# Patient Record
Sex: FEMALE | Race: WHITE | Employment: FULL TIME | URBAN - METROPOLITAN AREA
[De-identification: names, ages, dates, MRNs, and addresses within clinical notes are randomized per-mention and may not be internally consistent; named-entity substitution may affect disease eponyms.]

---

## 2018-10-04 ENCOUNTER — APPOINTMENT (OUTPATIENT)
Dept: GENERAL RADIOLOGY | Age: 63
End: 2018-10-04
Payer: COMMERCIAL

## 2018-10-04 ENCOUNTER — HOSPITAL ENCOUNTER (EMERGENCY)
Age: 63
Discharge: HOME | End: 2018-10-04
Payer: COMMERCIAL

## 2018-10-04 VITALS
TEMPERATURE: 98.3 F | RESPIRATION RATE: 16 BRPM | SYSTOLIC BLOOD PRESSURE: 135 MMHG | OXYGEN SATURATION: 97 % | WEIGHT: 195 LBS | DIASTOLIC BLOOD PRESSURE: 75 MMHG | HEART RATE: 97 BPM | BODY MASS INDEX: 31.34 KG/M2 | HEIGHT: 66 IN

## 2018-10-04 DIAGNOSIS — R09.81 NASAL CONGESTION: ICD-10-CM

## 2018-10-04 DIAGNOSIS — H93.8X3 EAR FULLNESS, BILATERAL: ICD-10-CM

## 2018-10-04 DIAGNOSIS — J40 BRONCHITIS: Primary | ICD-10-CM

## 2018-10-04 PROCEDURE — 6370000000 HC RX 637 (ALT 250 FOR IP): Performed by: NURSE PRACTITIONER

## 2018-10-04 PROCEDURE — 6360000002 HC RX W HCPCS: Performed by: NURSE PRACTITIONER

## 2018-10-04 PROCEDURE — 71046 X-RAY EXAM CHEST 2 VIEWS: CPT

## 2018-10-04 PROCEDURE — 99283 EMERGENCY DEPT VISIT LOW MDM: CPT

## 2018-10-04 PROCEDURE — 94664 DEMO&/EVAL PT USE INHALER: CPT

## 2018-10-04 PROCEDURE — 94644 CONT INHLJ TX 1ST HOUR: CPT

## 2018-10-04 RX ORDER — IPRATROPIUM BROMIDE AND ALBUTEROL SULFATE 2.5; .5 MG/3ML; MG/3ML
1 SOLUTION RESPIRATORY (INHALATION) ONCE
Status: COMPLETED | OUTPATIENT
Start: 2018-10-04 | End: 2018-10-04

## 2018-10-04 RX ORDER — ALBUTEROL SULFATE 90 UG/1
AEROSOL, METERED RESPIRATORY (INHALATION)
Qty: 1 INHALER | Refills: 1 | Status: SHIPPED | OUTPATIENT
Start: 2018-10-04

## 2018-10-04 RX ORDER — PREDNISONE 10 MG/1
40 TABLET ORAL DAILY
Qty: 20 TABLET | Refills: 0 | Status: SHIPPED | OUTPATIENT
Start: 2018-10-04 | End: 2018-10-09

## 2018-10-04 RX ORDER — BENZONATATE 100 MG/1
100 CAPSULE ORAL 3 TIMES DAILY PRN
Status: DISCONTINUED | OUTPATIENT
Start: 2018-10-04 | End: 2018-10-05 | Stop reason: HOSPADM

## 2018-10-04 RX ORDER — ALBUTEROL SULFATE 2.5 MG/3ML
5 SOLUTION RESPIRATORY (INHALATION) ONCE
Status: COMPLETED | OUTPATIENT
Start: 2018-10-04 | End: 2018-10-04

## 2018-10-04 RX ORDER — AZITHROMYCIN 250 MG/1
TABLET, FILM COATED ORAL
Qty: 1 PACKET | Refills: 0 | Status: SHIPPED | OUTPATIENT
Start: 2018-10-04 | End: 2018-10-14

## 2018-10-04 RX ORDER — PREDNISONE 20 MG/1
40 TABLET ORAL ONCE
Status: COMPLETED | OUTPATIENT
Start: 2018-10-04 | End: 2018-10-04

## 2018-10-04 RX ORDER — LORATADINE AND PSEUDOEPHEDRINE 10; 240 MG/1; MG/1
1 TABLET, EXTENDED RELEASE ORAL DAILY
COMMUNITY

## 2018-10-04 RX ORDER — BENZONATATE 100 MG/1
100 CAPSULE ORAL 3 TIMES DAILY PRN
Qty: 20 CAPSULE | Refills: 0 | Status: SHIPPED | OUTPATIENT
Start: 2018-10-04

## 2018-10-04 RX ADMIN — ALBUTEROL SULFATE 5 MG: 2.5 SOLUTION RESPIRATORY (INHALATION) at 21:43

## 2018-10-04 RX ADMIN — BENZONATATE 100 MG: 100 CAPSULE ORAL at 22:23

## 2018-10-04 RX ADMIN — PREDNISONE 40 MG: 20 TABLET ORAL at 21:33

## 2018-10-04 RX ADMIN — IPRATROPIUM BROMIDE AND ALBUTEROL SULFATE 1 AMPULE: .5; 3 SOLUTION RESPIRATORY (INHALATION) at 21:42

## 2018-10-04 ASSESSMENT — ENCOUNTER SYMPTOMS
CONSTIPATION: 0
EYES NEGATIVE: 1
SHORTNESS OF BREATH: 0
VOMITING: 0
WHEEZING: 1
ABDOMINAL PAIN: 0
COUGH: 1
DIARRHEA: 0
BACK PAIN: 0
NAUSEA: 0
ALLERGIC/IMMUNOLOGIC NEGATIVE: 1
ABDOMINAL DISTENTION: 0

## 2018-10-05 NOTE — ED PROVIDER NOTES
daily for 5 doses       DISCONTINUED MEDICATIONS:  Discontinued Medications    No medications on file              (Please note the MDM and HPI sections of this note were completed with a voice recognition program.  Efforts were made to edit the dictations but occasionally words are mis-transcribed.)    Electronically signed, CARLIE Vásquez CNP,        CARLIE Vásquez CNP  10/04/18 3326       CARLIE Vásquez CNP  10/04/18 2101

## 2024-08-12 ENCOUNTER — HOSPITAL ENCOUNTER (OUTPATIENT)
Age: 69
Setting detail: SPECIMEN
Discharge: HOME | End: 2024-08-12
Payer: MEDICARE

## 2024-08-12 LAB
ALBUMIN SERPL-MCNC: 3.6 G/DL (ref 3.4–5)
ALBUMIN/GLOB SERPL: 1.2 {RATIO} (ref 1.1–2.2)
ALP SERPL-CCNC: 73 U/L (ref 40–129)
ALT SERPL-CCNC: 8 U/L (ref 10–40)
ANION GAP SERPL CALCULATED.3IONS-SCNC: 9 MMOL/L (ref 3–16)
AST SERPL-CCNC: 11 U/L (ref 15–37)
BASOPHILS # BLD: 0 K/UL (ref 0–0.2)
BASOPHILS NFR BLD: 0.8 %
BILIRUB SERPL-MCNC: 0.3 MG/DL (ref 0–1)
BUN SERPL-MCNC: 28 MG/DL (ref 7–20)
CALCIUM SERPL-MCNC: 9.4 MG/DL (ref 8.3–10.6)
CHLORIDE SERPL-SCNC: 95 MMOL/L (ref 99–110)
CO2 SERPL-SCNC: 31 MMOL/L (ref 21–32)
CREAT SERPL-MCNC: 1.3 MG/DL (ref 0.6–1.2)
DEPRECATED RDW RBC AUTO: 15.1 % (ref 12.4–15.4)
EOSINOPHIL # BLD: 0.1 K/UL (ref 0–0.6)
EOSINOPHIL NFR BLD: 2.1 %
GFR SERPLBLD CREATININE-BSD FMLA CKD-EPI: 45 ML/MIN/{1.73_M2}
GLUCOSE SERPL-MCNC: 97 MG/DL (ref 70–99)
HCT VFR BLD AUTO: 24.9 % (ref 36–48)
HGB BLD-MCNC: 8.3 G/DL (ref 12–16)
LYMPHOCYTES # BLD: 0.6 K/UL (ref 1–5.1)
LYMPHOCYTES NFR BLD: 15.4 %
MAGNESIUM SERPL-MCNC: 1.6 MG/DL (ref 1.8–2.4)
MCH RBC QN AUTO: 29.4 PG (ref 26–34)
MCHC RBC AUTO-ENTMCNC: 33.5 G/DL (ref 31–36)
MCV RBC AUTO: 88 FL (ref 80–100)
MONOCYTES # BLD: 0.5 K/UL (ref 0–1.3)
MONOCYTES NFR BLD: 13 %
NEUTROPHILS # BLD: 2.7 K/UL (ref 1.7–7.7)
NEUTROPHILS NFR BLD: 68.7 %
PLATELET # BLD AUTO: 287 K/UL (ref 135–450)
PMV BLD AUTO: 9.8 FL (ref 5–10.5)
POTASSIUM SERPL-SCNC: 4.3 MMOL/L (ref 3.5–5.1)
PROT SERPL-MCNC: 6.6 G/DL (ref 6.4–8.2)
RBC # BLD AUTO: 2.84 M/UL (ref 4–5.2)
SODIUM SERPL-SCNC: 135 MMOL/L (ref 136–145)
WBC # BLD AUTO: 4 K/UL (ref 4–11)

## 2024-08-12 PROCEDURE — 85025 COMPLETE CBC W/AUTO DIFF WBC: CPT

## 2024-08-12 PROCEDURE — 80053 COMPREHEN METABOLIC PANEL: CPT

## 2024-08-12 PROCEDURE — 83735 ASSAY OF MAGNESIUM: CPT

## 2024-08-15 ENCOUNTER — HOSPITAL ENCOUNTER (OUTPATIENT)
Age: 69
Setting detail: SPECIMEN
Discharge: HOME | End: 2024-08-15
Payer: MEDICARE

## 2024-08-15 LAB
ALBUMIN SERPL-MCNC: 3.7 G/DL (ref 3.4–5)
ALBUMIN/GLOB SERPL: 1.1 {RATIO} (ref 1.1–2.2)
ALP SERPL-CCNC: 79 U/L (ref 40–129)
ALT SERPL-CCNC: 7 U/L (ref 10–40)
ANION GAP SERPL CALCULATED.3IONS-SCNC: 11 MMOL/L (ref 3–16)
AST SERPL-CCNC: 12 U/L (ref 15–37)
BASOPHILS # BLD: 0 K/UL (ref 0–0.2)
BASOPHILS NFR BLD: 1.1 %
BILIRUB SERPL-MCNC: 0.3 MG/DL (ref 0–1)
BUN SERPL-MCNC: 23 MG/DL (ref 7–20)
CALCIUM SERPL-MCNC: 9.6 MG/DL (ref 8.3–10.6)
CHLORIDE SERPL-SCNC: 94 MMOL/L (ref 99–110)
CO2 SERPL-SCNC: 29 MMOL/L (ref 21–32)
CREAT SERPL-MCNC: 1.4 MG/DL (ref 0.6–1.2)
DEPRECATED RDW RBC AUTO: 17 % (ref 12.4–15.4)
EOSINOPHIL # BLD: 0.1 K/UL (ref 0–0.6)
EOSINOPHIL NFR BLD: 1.8 %
GFR SERPLBLD CREATININE-BSD FMLA CKD-EPI: 41 ML/MIN/{1.73_M2}
GLUCOSE SERPL-MCNC: 188 MG/DL (ref 70–99)
HCT VFR BLD AUTO: 27 % (ref 36–48)
HGB BLD-MCNC: 9 G/DL (ref 12–16)
LYMPHOCYTES # BLD: 0.6 K/UL (ref 1–5.1)
LYMPHOCYTES NFR BLD: 13.6 %
MAGNESIUM SERPL-MCNC: 1.5 MG/DL (ref 1.8–2.4)
MCH RBC QN AUTO: 29.7 PG (ref 26–34)
MCHC RBC AUTO-ENTMCNC: 33.5 G/DL (ref 31–36)
MCV RBC AUTO: 88.6 FL (ref 80–100)
MONOCYTES # BLD: 0.5 K/UL (ref 0–1.3)
MONOCYTES NFR BLD: 12.3 %
NEUTROPHILS # BLD: 3 K/UL (ref 1.7–7.7)
NEUTROPHILS NFR BLD: 71.2 %
PLATELET # BLD AUTO: 332 K/UL (ref 135–450)
PMV BLD AUTO: 9.2 FL (ref 5–10.5)
POTASSIUM SERPL-SCNC: 4.1 MMOL/L (ref 3.5–5.1)
PROT SERPL-MCNC: 7.2 G/DL (ref 6.4–8.2)
RBC # BLD AUTO: 3.05 M/UL (ref 4–5.2)
SODIUM SERPL-SCNC: 134 MMOL/L (ref 136–145)
WBC # BLD AUTO: 4.2 K/UL (ref 4–11)

## 2024-08-15 PROCEDURE — 83735 ASSAY OF MAGNESIUM: CPT

## 2024-08-15 PROCEDURE — 80053 COMPREHEN METABOLIC PANEL: CPT

## 2024-08-15 PROCEDURE — 85025 COMPLETE CBC W/AUTO DIFF WBC: CPT

## 2024-08-15 PROCEDURE — 36415 COLL VENOUS BLD VENIPUNCTURE: CPT

## 2024-08-19 ENCOUNTER — HOSPITAL ENCOUNTER (OUTPATIENT)
Age: 69
Setting detail: SPECIMEN
Discharge: HOME | End: 2024-08-19
Payer: MEDICARE

## 2024-08-19 LAB
ALBUMIN SERPL-MCNC: 3.7 G/DL (ref 3.4–5)
ALBUMIN/GLOB SERPL: 1.1 {RATIO} (ref 1.1–2.2)
ALP SERPL-CCNC: 84 U/L (ref 40–129)
ALT SERPL-CCNC: 9 U/L (ref 10–40)
ANION GAP SERPL CALCULATED.3IONS-SCNC: 15 MMOL/L (ref 3–16)
AST SERPL-CCNC: 13 U/L (ref 15–37)
BASOPHILS # BLD: 0 K/UL (ref 0–0.2)
BASOPHILS NFR BLD: 1 %
BILIRUB SERPL-MCNC: <0.2 MG/DL (ref 0–1)
BUN SERPL-MCNC: 21 MG/DL (ref 7–20)
CALCIUM SERPL-MCNC: 9.7 MG/DL (ref 8.3–10.6)
CHLORIDE SERPL-SCNC: 92 MMOL/L (ref 99–110)
CO2 SERPL-SCNC: 30 MMOL/L (ref 21–32)
CREAT SERPL-MCNC: 1.4 MG/DL (ref 0.6–1.2)
DEPRECATED RDW RBC AUTO: 17.9 % (ref 12.4–15.4)
EOSINOPHIL # BLD: 0.1 K/UL (ref 0–0.6)
EOSINOPHIL NFR BLD: 2.1 %
GFR SERPLBLD CREATININE-BSD FMLA CKD-EPI: 41 ML/MIN/{1.73_M2}
GLUCOSE SERPL-MCNC: 108 MG/DL (ref 70–99)
HCT VFR BLD AUTO: 25.2 % (ref 36–48)
HGB BLD-MCNC: 8.5 G/DL (ref 12–16)
LYMPHOCYTES # BLD: 0.5 K/UL (ref 1–5.1)
LYMPHOCYTES NFR BLD: 12 %
MAGNESIUM SERPL-MCNC: 1.7 MG/DL (ref 1.8–2.4)
MCH RBC QN AUTO: 29.9 PG (ref 26–34)
MCHC RBC AUTO-ENTMCNC: 33.8 G/DL (ref 31–36)
MCV RBC AUTO: 88.4 FL (ref 80–100)
MONOCYTES # BLD: 0.6 K/UL (ref 0–1.3)
MONOCYTES NFR BLD: 12.9 %
NEUTROPHILS # BLD: 3.1 K/UL (ref 1.7–7.7)
NEUTROPHILS NFR BLD: 72 %
PLATELET # BLD AUTO: 285 K/UL (ref 135–450)
PMV BLD AUTO: 8.9 FL (ref 5–10.5)
POTASSIUM SERPL-SCNC: 4.1 MMOL/L (ref 3.5–5.1)
PROT SERPL-MCNC: 7.1 G/DL (ref 6.4–8.2)
RBC # BLD AUTO: 2.85 M/UL (ref 4–5.2)
SODIUM SERPL-SCNC: 137 MMOL/L (ref 136–145)
WBC # BLD AUTO: 4.3 K/UL (ref 4–11)

## 2024-08-19 PROCEDURE — 83735 ASSAY OF MAGNESIUM: CPT

## 2024-08-19 PROCEDURE — 85025 COMPLETE CBC W/AUTO DIFF WBC: CPT

## 2024-08-19 PROCEDURE — 80053 COMPREHEN METABOLIC PANEL: CPT

## 2024-08-26 ENCOUNTER — HOSPITAL ENCOUNTER (OUTPATIENT)
Age: 69
Setting detail: SPECIMEN
Discharge: HOME | End: 2024-08-26
Payer: MEDICARE

## 2024-08-26 LAB
ALBUMIN SERPL-MCNC: 3.6 G/DL (ref 3.4–5)
ALBUMIN/GLOB SERPL: 1 {RATIO} (ref 1.1–2.2)
ALP SERPL-CCNC: 79 U/L (ref 40–129)
ALT SERPL-CCNC: 7 U/L (ref 10–40)
ANION GAP SERPL CALCULATED.3IONS-SCNC: 13 MMOL/L (ref 3–16)
AST SERPL-CCNC: 12 U/L (ref 15–37)
BASOPHILS # BLD: 0.1 K/UL (ref 0–0.2)
BASOPHILS NFR BLD: 1 %
BILIRUB SERPL-MCNC: 0.3 MG/DL (ref 0–1)
BUN SERPL-MCNC: 25 MG/DL (ref 7–20)
CALCIUM SERPL-MCNC: 9.6 MG/DL (ref 8.3–10.6)
CHLORIDE SERPL-SCNC: 101 MMOL/L (ref 99–110)
CO2 SERPL-SCNC: 29 MMOL/L (ref 21–32)
CREAT SERPL-MCNC: 1.1 MG/DL (ref 0.6–1.2)
DEPRECATED RDW RBC AUTO: 17.8 % (ref 12.4–15.4)
EOSINOPHIL # BLD: 0.2 K/UL (ref 0–0.6)
EOSINOPHIL NFR BLD: 2.8 %
GFR SERPLBLD CREATININE-BSD FMLA CKD-EPI: 55 ML/MIN/{1.73_M2}
GLUCOSE SERPL-MCNC: 97 MG/DL (ref 70–99)
HCT VFR BLD AUTO: 24.7 % (ref 36–48)
HGB BLD-MCNC: 8 G/DL (ref 12–16)
LYMPHOCYTES # BLD: 0.7 K/UL (ref 1–5.1)
LYMPHOCYTES NFR BLD: 13.4 %
MAGNESIUM SERPL-MCNC: 1.7 MG/DL (ref 1.8–2.4)
MCH RBC QN AUTO: 29.4 PG (ref 26–34)
MCHC RBC AUTO-ENTMCNC: 32.6 G/DL (ref 31–36)
MCV RBC AUTO: 90.3 FL (ref 80–100)
MONOCYTES # BLD: 0.7 K/UL (ref 0–1.3)
MONOCYTES NFR BLD: 12.7 %
NEUTROPHILS # BLD: 3.8 K/UL (ref 1.7–7.7)
NEUTROPHILS NFR BLD: 70.1 %
PLATELET # BLD AUTO: 311 K/UL (ref 135–450)
PMV BLD AUTO: 8.8 FL (ref 5–10.5)
POTASSIUM SERPL-SCNC: 4.4 MMOL/L (ref 3.5–5.1)
PROT SERPL-MCNC: 7.1 G/DL (ref 6.4–8.2)
RBC # BLD AUTO: 2.73 M/UL (ref 4–5.2)
SODIUM SERPL-SCNC: 143 MMOL/L (ref 136–145)
WBC # BLD AUTO: 5.4 K/UL (ref 4–11)

## 2024-08-26 PROCEDURE — 80053 COMPREHEN METABOLIC PANEL: CPT

## 2024-08-26 PROCEDURE — 85025 COMPLETE CBC W/AUTO DIFF WBC: CPT

## 2024-08-26 PROCEDURE — 83735 ASSAY OF MAGNESIUM: CPT

## 2024-08-30 ENCOUNTER — HOSPITAL ENCOUNTER (OUTPATIENT)
Age: 69
Setting detail: SPECIMEN
Discharge: HOME | End: 2024-08-30
Payer: MEDICARE

## 2024-08-30 LAB
ALBUMIN SERPL-MCNC: 3.6 G/DL (ref 3.4–5)
ALBUMIN/GLOB SERPL: 1.1 {RATIO} (ref 1.1–2.2)
ALP SERPL-CCNC: 83 U/L (ref 40–129)
ALT SERPL-CCNC: 11 U/L (ref 10–40)
ANION GAP SERPL CALCULATED.3IONS-SCNC: 10 MMOL/L (ref 3–16)
AST SERPL-CCNC: 17 U/L (ref 15–37)
BASOPHILS # BLD: 0.1 K/UL (ref 0–0.2)
BASOPHILS NFR BLD: 1 %
BILIRUB SERPL-MCNC: 0.4 MG/DL (ref 0–1)
BUN SERPL-MCNC: 24 MG/DL (ref 7–20)
CALCIUM SERPL-MCNC: 9.5 MG/DL (ref 8.3–10.6)
CHLORIDE SERPL-SCNC: 100 MMOL/L (ref 99–110)
CO2 SERPL-SCNC: 30 MMOL/L (ref 21–32)
CREAT SERPL-MCNC: 1.2 MG/DL (ref 0.6–1.2)
DEPRECATED RDW RBC AUTO: 18.9 % (ref 12.4–15.4)
EOSINOPHIL # BLD: 0.2 K/UL (ref 0–0.6)
EOSINOPHIL NFR BLD: 2.2 %
GFR SERPLBLD CREATININE-BSD FMLA CKD-EPI: 49 ML/MIN/{1.73_M2}
GLUCOSE SERPL-MCNC: 94 MG/DL (ref 70–99)
HCT VFR BLD AUTO: 26.2 % (ref 36–48)
HGB BLD-MCNC: 8.7 G/DL (ref 12–16)
LYMPHOCYTES # BLD: 0.8 K/UL (ref 1–5.1)
LYMPHOCYTES NFR BLD: 11.3 %
MAGNESIUM SERPL-MCNC: 1.7 MG/DL (ref 1.8–2.4)
MCH RBC QN AUTO: 30 PG (ref 26–34)
MCHC RBC AUTO-ENTMCNC: 33.3 G/DL (ref 31–36)
MCV RBC AUTO: 90.2 FL (ref 80–100)
MONOCYTES # BLD: 0.9 K/UL (ref 0–1.3)
MONOCYTES NFR BLD: 12.7 %
NEUTROPHILS # BLD: 5.2 K/UL (ref 1.7–7.7)
NEUTROPHILS NFR BLD: 72.8 %
PLATELET # BLD AUTO: 300 K/UL (ref 135–450)
PMV BLD AUTO: 9.3 FL (ref 5–10.5)
POTASSIUM SERPL-SCNC: 4.2 MMOL/L (ref 3.5–5.1)
PROT SERPL-MCNC: 7 G/DL (ref 6.4–8.2)
RBC # BLD AUTO: 2.9 M/UL (ref 4–5.2)
SODIUM SERPL-SCNC: 140 MMOL/L (ref 136–145)
WBC # BLD AUTO: 7.1 K/UL (ref 4–11)

## 2024-08-30 PROCEDURE — 83735 ASSAY OF MAGNESIUM: CPT

## 2024-08-30 PROCEDURE — 36415 COLL VENOUS BLD VENIPUNCTURE: CPT

## 2024-08-30 PROCEDURE — 85025 COMPLETE CBC W/AUTO DIFF WBC: CPT

## 2024-08-30 PROCEDURE — 80053 COMPREHEN METABOLIC PANEL: CPT

## 2024-09-03 ENCOUNTER — HOSPITAL ENCOUNTER (OUTPATIENT)
Age: 69
Setting detail: SPECIMEN
Discharge: HOME | End: 2024-09-03
Payer: MEDICARE

## 2024-09-03 LAB
ALBUMIN SERPL-MCNC: 3.4 G/DL (ref 3.4–5)
ALBUMIN/GLOB SERPL: 1 {RATIO} (ref 1.1–2.2)
ALP SERPL-CCNC: 94 U/L (ref 40–129)
ALT SERPL-CCNC: 9 U/L (ref 10–40)
ANION GAP SERPL CALCULATED.3IONS-SCNC: 14 MMOL/L (ref 3–16)
AST SERPL-CCNC: 12 U/L (ref 15–37)
BASOPHILS # BLD: 0.1 K/UL (ref 0–0.2)
BASOPHILS NFR BLD: 1 %
BILIRUB SERPL-MCNC: 0.3 MG/DL (ref 0–1)
BUN SERPL-MCNC: 26 MG/DL (ref 7–20)
CALCIUM SERPL-MCNC: 9.2 MG/DL (ref 8.3–10.6)
CHLORIDE SERPL-SCNC: 95 MMOL/L (ref 99–110)
CO2 SERPL-SCNC: 29 MMOL/L (ref 21–32)
CREAT SERPL-MCNC: 1.2 MG/DL (ref 0.6–1.2)
DEPRECATED RDW RBC AUTO: 18.9 % (ref 12.4–15.4)
EOSINOPHIL # BLD: 0.2 K/UL (ref 0–0.6)
EOSINOPHIL NFR BLD: 2.9 %
GFR SERPLBLD CREATININE-BSD FMLA CKD-EPI: 49 ML/MIN/{1.73_M2}
GLUCOSE SERPL-MCNC: 100 MG/DL (ref 70–99)
HCT VFR BLD AUTO: 24.1 % (ref 36–48)
HGB BLD-MCNC: 8.1 G/DL (ref 12–16)
LYMPHOCYTES # BLD: 0.5 K/UL (ref 1–5.1)
LYMPHOCYTES NFR BLD: 10.4 %
MAGNESIUM SERPL-MCNC: 1.6 MG/DL (ref 1.8–2.4)
MCH RBC QN AUTO: 30.2 PG (ref 26–34)
MCHC RBC AUTO-ENTMCNC: 33.4 G/DL (ref 31–36)
MCV RBC AUTO: 90.4 FL (ref 80–100)
MONOCYTES # BLD: 0.9 K/UL (ref 0–1.3)
MONOCYTES NFR BLD: 17.8 %
NEUTROPHILS # BLD: 3.5 K/UL (ref 1.7–7.7)
NEUTROPHILS NFR BLD: 67.9 %
PLATELET # BLD AUTO: 262 K/UL (ref 135–450)
PMV BLD AUTO: 9.9 FL (ref 5–10.5)
POTASSIUM SERPL-SCNC: 4.4 MMOL/L (ref 3.5–5.1)
PROT SERPL-MCNC: 6.8 G/DL (ref 6.4–8.2)
RBC # BLD AUTO: 2.66 M/UL (ref 4–5.2)
SODIUM SERPL-SCNC: 138 MMOL/L (ref 136–145)
WBC # BLD AUTO: 5.2 K/UL (ref 4–11)

## 2024-09-03 PROCEDURE — 85025 COMPLETE CBC W/AUTO DIFF WBC: CPT

## 2024-09-03 PROCEDURE — 36415 COLL VENOUS BLD VENIPUNCTURE: CPT

## 2024-09-03 PROCEDURE — 83735 ASSAY OF MAGNESIUM: CPT

## 2024-09-03 PROCEDURE — 80053 COMPREHEN METABOLIC PANEL: CPT

## 2024-09-08 ENCOUNTER — APPOINTMENT (OUTPATIENT)
Dept: GENERAL RADIOLOGY | Age: 69
End: 2024-09-08
Payer: MEDICARE

## 2024-09-08 ENCOUNTER — HOSPITAL ENCOUNTER (EMERGENCY)
Age: 69
Discharge: ANOTHER HOSPITAL | End: 2024-09-09
Attending: EMERGENCY MEDICINE
Payer: MEDICARE

## 2024-09-08 DIAGNOSIS — A41.9 SEPTICEMIA (HCC): Primary | ICD-10-CM

## 2024-09-08 DIAGNOSIS — N30.00 ACUTE CYSTITIS WITHOUT HEMATURIA: ICD-10-CM

## 2024-09-08 DIAGNOSIS — R06.00 DYSPNEA, UNSPECIFIED TYPE: ICD-10-CM

## 2024-09-08 LAB
ALBUMIN SERPL-MCNC: 3.6 G/DL (ref 3.4–5)
ALBUMIN/GLOB SERPL: 0.9 {RATIO} (ref 1.1–2.2)
ALP SERPL-CCNC: 93 U/L (ref 40–129)
ALT SERPL-CCNC: 12 U/L (ref 10–40)
AMORPH SED URNS QL MICRO: ABNORMAL /HPF
ANION GAP SERPL CALCULATED.3IONS-SCNC: 15 MMOL/L (ref 3–16)
AST SERPL-CCNC: 20 U/L (ref 15–37)
BACTERIA URNS QL MICRO: ABNORMAL /HPF
BASOPHILS # BLD: 0.1 K/UL (ref 0–0.2)
BASOPHILS NFR BLD: 1 %
BILIRUB SERPL-MCNC: 0.5 MG/DL (ref 0–1)
BILIRUB UR QL STRIP.AUTO: NEGATIVE
BUN SERPL-MCNC: 35 MG/DL (ref 7–20)
CALCIUM SERPL-MCNC: 9.4 MG/DL (ref 8.3–10.6)
CHLORIDE SERPL-SCNC: 94 MMOL/L (ref 99–110)
CLARITY UR: CLEAR
CO2 SERPL-SCNC: 28 MMOL/L (ref 21–32)
COLOR UR: YELLOW
CREAT SERPL-MCNC: 1.6 MG/DL (ref 0.6–1.2)
DEPRECATED RDW RBC AUTO: 19.3 % (ref 12.4–15.4)
EOSINOPHIL # BLD: 0 K/UL (ref 0–0.6)
EOSINOPHIL NFR BLD: 0.1 %
EPI CELLS #/AREA URNS HPF: ABNORMAL /HPF (ref 0–5)
FLUAV RNA UPPER RESP QL NAA+PROBE: NEGATIVE
FLUBV AG NPH QL: NEGATIVE
GFR SERPLBLD CREATININE-BSD FMLA CKD-EPI: 35 ML/MIN/{1.73_M2}
GLUCOSE SERPL-MCNC: 153 MG/DL (ref 70–99)
GLUCOSE UR STRIP.AUTO-MCNC: NEGATIVE MG/DL
HCT VFR BLD AUTO: 25.4 % (ref 36–48)
HGB BLD-MCNC: 8.5 G/DL (ref 12–16)
HGB UR QL STRIP.AUTO: ABNORMAL
KETONES UR STRIP.AUTO-MCNC: NEGATIVE MG/DL
LACTATE BLDV-SCNC: 1.2 MMOL/L (ref 0.4–1.9)
LEUKOCYTE ESTERASE UR QL STRIP.AUTO: ABNORMAL
LYMPHOCYTES # BLD: 0.3 K/UL (ref 1–5.1)
LYMPHOCYTES NFR BLD: 3.4 %
MCH RBC QN AUTO: 30.1 PG (ref 26–34)
MCHC RBC AUTO-ENTMCNC: 33.6 G/DL (ref 31–36)
MCV RBC AUTO: 89.6 FL (ref 80–100)
MONOCYTES # BLD: 1.3 K/UL (ref 0–1.3)
MONOCYTES NFR BLD: 13.6 %
NEUTROPHILS # BLD: 7.8 K/UL (ref 1.7–7.7)
NEUTROPHILS NFR BLD: 81.9 %
NITRITE UR QL STRIP.AUTO: NEGATIVE
PH UR STRIP.AUTO: 6.5 [PH] (ref 5–8)
PLATELET # BLD AUTO: 177 K/UL (ref 135–450)
PMV BLD AUTO: 9.1 FL (ref 5–10.5)
POTASSIUM SERPL-SCNC: 3.9 MMOL/L (ref 3.5–5.1)
PROT SERPL-MCNC: 7.7 G/DL (ref 6.4–8.2)
PROT UR STRIP.AUTO-MCNC: 100 MG/DL
RBC # BLD AUTO: 2.84 M/UL (ref 4–5.2)
RBC #/AREA URNS HPF: ABNORMAL /HPF (ref 0–4)
SARS-COV-2 RDRP RESP QL NAA+PROBE: NOT DETECTED
SODIUM SERPL-SCNC: 137 MMOL/L (ref 136–145)
SP GR UR STRIP.AUTO: 1.01 (ref 1–1.03)
TROPONIN, HIGH SENSITIVITY: 23 NG/L (ref 0–14)
TROPONIN, HIGH SENSITIVITY: 25 NG/L (ref 0–14)
UA COMPLETE W REFLEX CULTURE PNL UR: YES
UA DIPSTICK W REFLEX MICRO PNL UR: YES
URN SPEC COLLECT METH UR: ABNORMAL
UROBILINOGEN UR STRIP-ACNC: 0.2 E.U./DL
WBC # BLD AUTO: 9.6 K/UL (ref 4–11)
WBC #/AREA URNS HPF: ABNORMAL /HPF (ref 0–5)

## 2024-09-08 PROCEDURE — 96365 THER/PROPH/DIAG IV INF INIT: CPT

## 2024-09-08 PROCEDURE — 84145 PROCALCITONIN (PCT): CPT

## 2024-09-08 PROCEDURE — 36415 COLL VENOUS BLD VENIPUNCTURE: CPT

## 2024-09-08 PROCEDURE — 83605 ASSAY OF LACTIC ACID: CPT

## 2024-09-08 PROCEDURE — 84484 ASSAY OF TROPONIN QUANT: CPT

## 2024-09-08 PROCEDURE — 87086 URINE CULTURE/COLONY COUNT: CPT

## 2024-09-08 PROCEDURE — 87186 SC STD MICRODIL/AGAR DIL: CPT

## 2024-09-08 PROCEDURE — 87635 SARS-COV-2 COVID-19 AMP PRB: CPT

## 2024-09-08 PROCEDURE — 85025 COMPLETE CBC W/AUTO DIFF WBC: CPT

## 2024-09-08 PROCEDURE — 80053 COMPREHEN METABOLIC PANEL: CPT

## 2024-09-08 PROCEDURE — 71045 X-RAY EXAM CHEST 1 VIEW: CPT

## 2024-09-08 PROCEDURE — 87040 BLOOD CULTURE FOR BACTERIA: CPT

## 2024-09-08 PROCEDURE — 87804 INFLUENZA ASSAY W/OPTIC: CPT

## 2024-09-08 PROCEDURE — 87150 DNA/RNA AMPLIFIED PROBE: CPT

## 2024-09-08 PROCEDURE — 6370000000 HC RX 637 (ALT 250 FOR IP): Performed by: EMERGENCY MEDICINE

## 2024-09-08 PROCEDURE — 99285 EMERGENCY DEPT VISIT HI MDM: CPT

## 2024-09-08 PROCEDURE — 93005 ELECTROCARDIOGRAM TRACING: CPT | Performed by: EMERGENCY MEDICINE

## 2024-09-08 PROCEDURE — 2580000003 HC RX 258: Performed by: EMERGENCY MEDICINE

## 2024-09-08 PROCEDURE — 81001 URINALYSIS AUTO W/SCOPE: CPT

## 2024-09-08 PROCEDURE — 6360000002 HC RX W HCPCS: Performed by: EMERGENCY MEDICINE

## 2024-09-08 RX ORDER — SODIUM CHLORIDE, SODIUM LACTATE, POTASSIUM CHLORIDE, AND CALCIUM CHLORIDE .6; .31; .03; .02 G/100ML; G/100ML; G/100ML; G/100ML
30 INJECTION, SOLUTION INTRAVENOUS ONCE
Status: COMPLETED | OUTPATIENT
Start: 2024-09-08 | End: 2024-09-08

## 2024-09-08 RX ORDER — ACETAMINOPHEN 160 MG/5ML
650 LIQUID ORAL ONCE
Status: COMPLETED | OUTPATIENT
Start: 2024-09-08 | End: 2024-09-08

## 2024-09-08 RX ORDER — ACETAMINOPHEN 650 MG/1
650 SUPPOSITORY RECTAL ONCE
Status: COMPLETED | OUTPATIENT
Start: 2024-09-08 | End: 2024-09-08

## 2024-09-08 RX ADMIN — ACETAMINOPHEN 650 MG: 650 SUPPOSITORY RECTAL at 21:53

## 2024-09-08 RX ADMIN — SODIUM CHLORIDE, POTASSIUM CHLORIDE, SODIUM LACTATE AND CALCIUM CHLORIDE 2298 ML: 600; 310; 30; 20 INJECTION, SOLUTION INTRAVENOUS at 20:48

## 2024-09-08 RX ADMIN — ACETAMINOPHEN 650 MG: 160 SOLUTION ORAL at 20:53

## 2024-09-08 RX ADMIN — PIPERACILLIN AND TAZOBACTAM 3375 MG: 3; .375 INJECTION, POWDER, LYOPHILIZED, FOR SOLUTION INTRAVENOUS at 21:02

## 2024-09-08 ASSESSMENT — PAIN - FUNCTIONAL ASSESSMENT: PAIN_FUNCTIONAL_ASSESSMENT: 0-10

## 2024-09-08 ASSESSMENT — LIFESTYLE VARIABLES
HOW MANY STANDARD DRINKS CONTAINING ALCOHOL DO YOU HAVE ON A TYPICAL DAY: PATIENT DOES NOT DRINK
HOW OFTEN DO YOU HAVE A DRINK CONTAINING ALCOHOL: NEVER

## 2024-09-08 ASSESSMENT — PAIN DESCRIPTION - LOCATION: LOCATION: FLANK

## 2024-09-08 ASSESSMENT — PAIN DESCRIPTION - DESCRIPTORS: DESCRIPTORS: OTHER (COMMENT)

## 2024-09-08 ASSESSMENT — PAIN SCALES - GENERAL
PAINLEVEL_OUTOF10: 8

## 2024-09-08 ASSESSMENT — PAIN DESCRIPTION - ORIENTATION: ORIENTATION: RIGHT

## 2024-09-08 ASSESSMENT — PAIN DESCRIPTION - PAIN TYPE: TYPE: ACUTE PAIN

## 2024-09-09 VITALS
HEART RATE: 90 BPM | DIASTOLIC BLOOD PRESSURE: 66 MMHG | BODY MASS INDEX: 28.12 KG/M2 | HEIGHT: 65 IN | SYSTOLIC BLOOD PRESSURE: 114 MMHG | TEMPERATURE: 98.6 F | OXYGEN SATURATION: 98 % | RESPIRATION RATE: 19 BRPM | WEIGHT: 168.8 LBS

## 2024-09-09 LAB
EKG ATRIAL RATE: 122 BPM
EKG DIAGNOSIS: NORMAL
EKG P AXIS: 34 DEGREES
EKG P-R INTERVAL: 130 MS
EKG Q-T INTERVAL: 318 MS
EKG QRS DURATION: 76 MS
EKG QTC CALCULATION (BAZETT): 453 MS
EKG R AXIS: -2 DEGREES
EKG T AXIS: 26 DEGREES
EKG VENTRICULAR RATE: 122 BPM
PROCALCITONIN SERPL IA-MCNC: 1.92 NG/ML (ref 0–0.15)
REPORT: NORMAL

## 2024-09-09 PROCEDURE — 6360000002 HC RX W HCPCS: Performed by: EMERGENCY MEDICINE

## 2024-09-09 PROCEDURE — 93010 ELECTROCARDIOGRAM REPORT: CPT | Performed by: INTERNAL MEDICINE

## 2024-09-09 PROCEDURE — 96375 TX/PRO/DX INJ NEW DRUG ADDON: CPT

## 2024-09-09 RX ORDER — KETOROLAC TROMETHAMINE 15 MG/ML
15 INJECTION, SOLUTION INTRAMUSCULAR; INTRAVENOUS ONCE
Status: COMPLETED | OUTPATIENT
Start: 2024-09-09 | End: 2024-09-09

## 2024-09-09 RX ADMIN — KETOROLAC TROMETHAMINE 15 MG: 15 INJECTION, SOLUTION INTRAMUSCULAR; INTRAVENOUS at 01:26

## 2024-09-09 ASSESSMENT — PAIN SCALES - GENERAL
PAINLEVEL_OUTOF10: 6
PAINLEVEL_OUTOF10: 10

## 2024-09-10 LAB — BACTERIA UR CULT: NORMAL

## 2024-09-11 LAB
BACTERIA BLD CULT ORG #2: ABNORMAL
BACTERIA BLD CULT: ABNORMAL
BACTERIA BLD CULT: ABNORMAL
ORGANISM: ABNORMAL

## 2024-10-15 ENCOUNTER — HOSPITAL ENCOUNTER (OUTPATIENT)
Age: 69
Setting detail: SPECIMEN
Discharge: HOME | End: 2024-10-15
Payer: MEDICARE

## 2024-10-15 LAB
ALBUMIN SERPL-MCNC: 3.4 G/DL (ref 3.4–5)
ALBUMIN/GLOB SERPL: 0.8 {RATIO} (ref 1.1–2.2)
ALP SERPL-CCNC: 82 U/L (ref 40–129)
ALT SERPL-CCNC: <5 U/L (ref 10–40)
ANION GAP SERPL CALCULATED.3IONS-SCNC: 9 MMOL/L (ref 3–16)
AST SERPL-CCNC: 8 U/L (ref 15–37)
BASOPHILS # BLD: 0.1 K/UL (ref 0–0.2)
BASOPHILS NFR BLD: 1.2 %
BILIRUB SERPL-MCNC: 0.4 MG/DL (ref 0–1)
BUN SERPL-MCNC: 16 MG/DL (ref 7–20)
CALCIUM SERPL-MCNC: 9.2 MG/DL (ref 8.3–10.6)
CHLORIDE SERPL-SCNC: 91 MMOL/L (ref 99–110)
CO2 SERPL-SCNC: 28 MMOL/L (ref 21–32)
CREAT SERPL-MCNC: 1.3 MG/DL (ref 0.6–1.2)
DEPRECATED RDW RBC AUTO: 15.5 % (ref 12.4–15.4)
EOSINOPHIL # BLD: 0.1 K/UL (ref 0–0.6)
EOSINOPHIL NFR BLD: 0.9 %
GFR SERPLBLD CREATININE-BSD FMLA CKD-EPI: 45 ML/MIN/{1.73_M2}
GLUCOSE SERPL-MCNC: 148 MG/DL (ref 70–99)
HCT VFR BLD AUTO: 23.6 % (ref 36–48)
HGB BLD-MCNC: 8.1 G/DL (ref 12–16)
LYMPHOCYTES # BLD: 0.4 K/UL (ref 1–5.1)
LYMPHOCYTES NFR BLD: 4.2 %
MAGNESIUM SERPL-MCNC: 1.7 MG/DL (ref 1.8–2.4)
MCH RBC QN AUTO: 32.3 PG (ref 26–34)
MCHC RBC AUTO-ENTMCNC: 34.3 G/DL (ref 31–36)
MCV RBC AUTO: 94.1 FL (ref 80–100)
MONOCYTES # BLD: 0.9 K/UL (ref 0–1.3)
MONOCYTES NFR BLD: 8.9 %
NEUTROPHILS # BLD: 8.3 K/UL (ref 1.7–7.7)
NEUTROPHILS NFR BLD: 84.8 %
PLATELET # BLD AUTO: 306 K/UL (ref 135–450)
PMV BLD AUTO: 9.1 FL (ref 5–10.5)
POTASSIUM SERPL-SCNC: 4 MMOL/L (ref 3.5–5.1)
PROT SERPL-MCNC: 7.5 G/DL (ref 6.4–8.2)
RBC # BLD AUTO: 2.5 M/UL (ref 4–5.2)
SODIUM SERPL-SCNC: 128 MMOL/L (ref 136–145)
WBC # BLD AUTO: 9.8 K/UL (ref 4–11)

## 2024-10-15 PROCEDURE — 83735 ASSAY OF MAGNESIUM: CPT

## 2024-10-15 PROCEDURE — 80053 COMPREHEN METABOLIC PANEL: CPT

## 2024-10-15 PROCEDURE — 85025 COMPLETE CBC W/AUTO DIFF WBC: CPT

## 2024-10-22 ENCOUNTER — HOSPITAL ENCOUNTER (OUTPATIENT)
Age: 69
Setting detail: SPECIMEN
Discharge: HOME | End: 2024-10-22
Payer: MEDICARE

## 2024-10-22 LAB
ALBUMIN SERPL-MCNC: 3.4 G/DL (ref 3.4–5)
ALBUMIN/GLOB SERPL: 0.8 {RATIO} (ref 1.1–2.2)
ALP SERPL-CCNC: 111 U/L (ref 40–129)
ALT SERPL-CCNC: 7 U/L (ref 10–40)
ANION GAP SERPL CALCULATED.3IONS-SCNC: 11 MMOL/L (ref 3–16)
AST SERPL-CCNC: 11 U/L (ref 15–37)
BASOPHILS # BLD: 0 K/UL (ref 0–0.2)
BASOPHILS NFR BLD: 0.5 %
BILIRUB SERPL-MCNC: 0.3 MG/DL (ref 0–1)
BUN SERPL-MCNC: 15 MG/DL (ref 7–20)
CALCIUM SERPL-MCNC: 9.4 MG/DL (ref 8.3–10.6)
CHLORIDE SERPL-SCNC: 90 MMOL/L (ref 99–110)
CO2 SERPL-SCNC: 29 MMOL/L (ref 21–32)
CREAT SERPL-MCNC: 1.2 MG/DL (ref 0.6–1.2)
DEPRECATED RDW RBC AUTO: 15.1 % (ref 12.4–15.4)
EOSINOPHIL # BLD: 0.1 K/UL (ref 0–0.6)
EOSINOPHIL NFR BLD: 1.6 %
GFR SERPLBLD CREATININE-BSD FMLA CKD-EPI: 49 ML/MIN/{1.73_M2}
GLUCOSE SERPL-MCNC: 97 MG/DL (ref 70–99)
HCT VFR BLD AUTO: 23.4 % (ref 36–48)
HGB BLD-MCNC: 8 G/DL (ref 12–16)
LYMPHOCYTES # BLD: 0.5 K/UL (ref 1–5.1)
LYMPHOCYTES NFR BLD: 5.7 %
MAGNESIUM SERPL-MCNC: 1.8 MG/DL (ref 1.8–2.4)
MCH RBC QN AUTO: 32.1 PG (ref 26–34)
MCHC RBC AUTO-ENTMCNC: 34.3 G/DL (ref 31–36)
MCV RBC AUTO: 93.5 FL (ref 80–100)
MONOCYTES # BLD: 1.1 K/UL (ref 0–1.3)
MONOCYTES NFR BLD: 11.6 %
NEUTROPHILS # BLD: 7.4 K/UL (ref 1.7–7.7)
NEUTROPHILS NFR BLD: 80.6 %
PLATELET # BLD AUTO: 378 K/UL (ref 135–450)
PMV BLD AUTO: 8.8 FL (ref 5–10.5)
POTASSIUM SERPL-SCNC: 3.9 MMOL/L (ref 3.5–5.1)
PROT SERPL-MCNC: 7.8 G/DL (ref 6.4–8.2)
RBC # BLD AUTO: 2.51 M/UL (ref 4–5.2)
SODIUM SERPL-SCNC: 130 MMOL/L (ref 136–145)
WBC # BLD AUTO: 9.1 K/UL (ref 4–11)

## 2024-10-22 PROCEDURE — 83735 ASSAY OF MAGNESIUM: CPT

## 2024-10-22 PROCEDURE — 80053 COMPREHEN METABOLIC PANEL: CPT

## 2024-10-22 PROCEDURE — 85025 COMPLETE CBC W/AUTO DIFF WBC: CPT

## 2024-11-04 ENCOUNTER — HOSPITAL ENCOUNTER (OUTPATIENT)
Age: 69
Setting detail: SPECIMEN
Discharge: HOME | End: 2024-11-04
Payer: MEDICARE

## 2024-11-04 LAB
ALBUMIN SERPL-MCNC: 3.2 G/DL (ref 3.4–5)
ALBUMIN/GLOB SERPL: 0.8 {RATIO} (ref 1.1–2.2)
ALP SERPL-CCNC: 94 U/L (ref 40–129)
ALT SERPL-CCNC: 11 U/L (ref 10–40)
ANION GAP SERPL CALCULATED.3IONS-SCNC: 11 MMOL/L (ref 3–16)
AST SERPL-CCNC: 19 U/L (ref 15–37)
BASOPHILS # BLD: 0.1 K/UL (ref 0–0.2)
BASOPHILS NFR BLD: 0.5 %
BILIRUB SERPL-MCNC: 0.4 MG/DL (ref 0–1)
BUN SERPL-MCNC: 26 MG/DL (ref 7–20)
CALCIUM SERPL-MCNC: 9.1 MG/DL (ref 8.3–10.6)
CHLORIDE SERPL-SCNC: 89 MMOL/L (ref 99–110)
CO2 SERPL-SCNC: 27 MMOL/L (ref 21–32)
CREAT SERPL-MCNC: 1.4 MG/DL (ref 0.6–1.2)
DEPRECATED RDW RBC AUTO: 15.4 % (ref 12.4–15.4)
EOSINOPHIL # BLD: 0 K/UL (ref 0–0.6)
EOSINOPHIL NFR BLD: 0.3 %
GFR SERPLBLD CREATININE-BSD FMLA CKD-EPI: 41 ML/MIN/{1.73_M2}
GLUCOSE SERPL-MCNC: 98 MG/DL (ref 70–99)
HCT VFR BLD AUTO: 20.6 % (ref 36–48)
HGB BLD-MCNC: 6.9 G/DL (ref 12–16)
LYMPHOCYTES # BLD: 0.5 K/UL (ref 1–5.1)
LYMPHOCYTES NFR BLD: 4.1 %
MAGNESIUM SERPL-MCNC: 1.76 MG/DL (ref 1.8–2.4)
MCH RBC QN AUTO: 30.1 PG (ref 26–34)
MCHC RBC AUTO-ENTMCNC: 33.5 G/DL (ref 31–36)
MCV RBC AUTO: 89.9 FL (ref 80–100)
MONOCYTES # BLD: 1.2 K/UL (ref 0–1.3)
MONOCYTES NFR BLD: 8.7 %
NEUTROPHILS # BLD: 11.6 K/UL (ref 1.7–7.7)
NEUTROPHILS NFR BLD: 86.4 %
PLATELET # BLD AUTO: 286 K/UL (ref 135–450)
PMV BLD AUTO: 9.1 FL (ref 5–10.5)
POTASSIUM SERPL-SCNC: 4.2 MMOL/L (ref 3.5–5.1)
PROT SERPL-MCNC: 7.3 G/DL (ref 6.4–8.2)
RBC # BLD AUTO: 2.29 M/UL (ref 4–5.2)
SODIUM SERPL-SCNC: 127 MMOL/L (ref 136–145)
WBC # BLD AUTO: 13.4 K/UL (ref 4–11)

## 2024-11-04 PROCEDURE — 80053 COMPREHEN METABOLIC PANEL: CPT

## 2024-11-04 PROCEDURE — 85025 COMPLETE CBC W/AUTO DIFF WBC: CPT

## 2024-11-04 PROCEDURE — 83735 ASSAY OF MAGNESIUM: CPT

## 2024-11-04 PROCEDURE — 36415 COLL VENOUS BLD VENIPUNCTURE: CPT

## 2024-12-10 ENCOUNTER — APPOINTMENT (OUTPATIENT)
Dept: CT IMAGING | Age: 69
DRG: 640 | End: 2024-12-10
Payer: MEDICARE

## 2024-12-10 ENCOUNTER — HOSPITAL ENCOUNTER (INPATIENT)
Age: 69
LOS: 14 days | Discharge: NURSING, REHAB OR LONG-TERM CARE | DRG: 640 | End: 2024-12-24
Attending: EMERGENCY MEDICINE | Admitting: INTERNAL MEDICINE
Payer: MEDICARE

## 2024-12-10 DIAGNOSIS — R62.7 FAILURE TO THRIVE IN ADULT: ICD-10-CM

## 2024-12-10 DIAGNOSIS — N17.9 ACUTE RENAL FAILURE, UNSPECIFIED ACUTE RENAL FAILURE TYPE (HCC): Primary | ICD-10-CM

## 2024-12-10 DIAGNOSIS — E86.0 DEHYDRATION: ICD-10-CM

## 2024-12-10 DIAGNOSIS — K56.7 ILEUS (HCC): ICD-10-CM

## 2024-12-10 DIAGNOSIS — E83.42 HYPOMAGNESEMIA: ICD-10-CM

## 2024-12-10 DIAGNOSIS — I50.9 ACUTE CONGESTIVE HEART FAILURE, UNSPECIFIED HEART FAILURE TYPE (HCC): ICD-10-CM

## 2024-12-10 DIAGNOSIS — Z85.21 H/O LARYNGEAL CANCER: ICD-10-CM

## 2024-12-10 DIAGNOSIS — E87.1 HYPONATREMIA: ICD-10-CM

## 2024-12-10 DIAGNOSIS — R29.898 RIGHT ARM WEAKNESS: ICD-10-CM

## 2024-12-10 DIAGNOSIS — D64.9 ANEMIA, UNSPECIFIED TYPE: ICD-10-CM

## 2024-12-10 LAB
ABO + RH BLD: NORMAL
ALBUMIN SERPL-MCNC: 2.6 G/DL (ref 3.4–5)
ALBUMIN/GLOB SERPL: 0.5 {RATIO} (ref 1.1–2.2)
ALP SERPL-CCNC: 82 U/L (ref 40–129)
ALT SERPL-CCNC: <5 U/L (ref 10–40)
ANION GAP SERPL CALCULATED.3IONS-SCNC: 12 MMOL/L (ref 3–16)
AST SERPL-CCNC: 13 U/L (ref 15–37)
BASOPHILS # BLD: 0 K/UL (ref 0–0.2)
BASOPHILS NFR BLD: 0.7 %
BILIRUB SERPL-MCNC: 0.5 MG/DL (ref 0–1)
BLD GP AB SCN SERPL QL: NORMAL
BLOOD BANK DISPENSE STATUS: NORMAL
BLOOD BANK PRODUCT CODE: NORMAL
BPU ID: NORMAL
BUN SERPL-MCNC: 68 MG/DL (ref 7–20)
CALCIUM SERPL-MCNC: 8.1 MG/DL (ref 8.3–10.6)
CHLORIDE SERPL-SCNC: 87 MMOL/L (ref 99–110)
CO2 SERPL-SCNC: 23 MMOL/L (ref 21–32)
CREAT SERPL-MCNC: 4.7 MG/DL (ref 0.6–1.2)
DEPRECATED RDW RBC AUTO: 16.9 % (ref 12.4–15.4)
DESCRIPTION BLOOD BANK: NORMAL
EOSINOPHIL # BLD: 0 K/UL (ref 0–0.6)
EOSINOPHIL NFR BLD: 0.3 %
GFR SERPLBLD CREATININE-BSD FMLA CKD-EPI: 10 ML/MIN/{1.73_M2}
GLUCOSE SERPL-MCNC: 85 MG/DL (ref 70–99)
HCT VFR BLD AUTO: 19.5 % (ref 36–48)
HEMOCCULT STL QL: NORMAL
HGB BLD-MCNC: 6.6 G/DL (ref 12–16)
LYMPHOCYTES # BLD: 0.4 K/UL (ref 1–5.1)
LYMPHOCYTES NFR BLD: 5.1 %
MAGNESIUM SERPL-MCNC: 1.46 MG/DL (ref 1.8–2.4)
MCH RBC QN AUTO: 28.5 PG (ref 26–34)
MCHC RBC AUTO-ENTMCNC: 33.8 G/DL (ref 31–36)
MCV RBC AUTO: 84.5 FL (ref 80–100)
MONOCYTES # BLD: 0.3 K/UL (ref 0–1.3)
MONOCYTES NFR BLD: 3.7 %
NEUTROPHILS # BLD: 6.3 K/UL (ref 1.7–7.7)
NEUTROPHILS NFR BLD: 90.2 %
PLATELET # BLD AUTO: 226 K/UL (ref 135–450)
PMV BLD AUTO: 7.8 FL (ref 5–10.5)
POTASSIUM SERPL-SCNC: 4.9 MMOL/L (ref 3.5–5.1)
PROT SERPL-MCNC: 7.7 G/DL (ref 6.4–8.2)
RBC # BLD AUTO: 2.31 M/UL (ref 4–5.2)
SODIUM SERPL-SCNC: 122 MMOL/L (ref 136–145)
TROPONIN, HIGH SENSITIVITY: 24 NG/L (ref 0–14)
TROPONIN, HIGH SENSITIVITY: 27 NG/L (ref 0–14)
WBC # BLD AUTO: 7 K/UL (ref 4–11)

## 2024-12-10 PROCEDURE — P9016 RBC LEUKOCYTES REDUCED: HCPCS

## 2024-12-10 PROCEDURE — 93005 ELECTROCARDIOGRAM TRACING: CPT | Performed by: PHYSICIAN ASSISTANT

## 2024-12-10 PROCEDURE — 74176 CT ABD & PELVIS W/O CONTRAST: CPT

## 2024-12-10 PROCEDURE — 30233N1 TRANSFUSION OF NONAUTOLOGOUS RED BLOOD CELLS INTO PERIPHERAL VEIN, PERCUTANEOUS APPROACH: ICD-10-PCS | Performed by: INTERNAL MEDICINE

## 2024-12-10 PROCEDURE — 86870 RBC ANTIBODY IDENTIFICATION: CPT

## 2024-12-10 PROCEDURE — 86860 RBC ANTIBODY ELUTION: CPT

## 2024-12-10 PROCEDURE — 85025 COMPLETE CBC W/AUTO DIFF WBC: CPT

## 2024-12-10 PROCEDURE — 86923 COMPATIBILITY TEST ELECTRIC: CPT

## 2024-12-10 PROCEDURE — 70450 CT HEAD/BRAIN W/O DYE: CPT

## 2024-12-10 PROCEDURE — 86880 COOMBS TEST DIRECT: CPT

## 2024-12-10 PROCEDURE — 6360000002 HC RX W HCPCS: Performed by: PHYSICIAN ASSISTANT

## 2024-12-10 PROCEDURE — 86900 BLOOD TYPING SEROLOGIC ABO: CPT

## 2024-12-10 PROCEDURE — 86901 BLOOD TYPING SEROLOGIC RH(D): CPT

## 2024-12-10 PROCEDURE — 86850 RBC ANTIBODY SCREEN: CPT

## 2024-12-10 PROCEDURE — 2000000000 HC ICU R&B

## 2024-12-10 PROCEDURE — 96361 HYDRATE IV INFUSION ADD-ON: CPT

## 2024-12-10 PROCEDURE — 96376 TX/PRO/DX INJ SAME DRUG ADON: CPT

## 2024-12-10 PROCEDURE — 96375 TX/PRO/DX INJ NEW DRUG ADDON: CPT

## 2024-12-10 PROCEDURE — 96365 THER/PROPH/DIAG IV INF INIT: CPT

## 2024-12-10 PROCEDURE — 96366 THER/PROPH/DIAG IV INF ADDON: CPT

## 2024-12-10 PROCEDURE — 83735 ASSAY OF MAGNESIUM: CPT

## 2024-12-10 PROCEDURE — 80053 COMPREHEN METABOLIC PANEL: CPT

## 2024-12-10 PROCEDURE — 2580000003 HC RX 258: Performed by: PHYSICIAN ASSISTANT

## 2024-12-10 PROCEDURE — 82270 OCCULT BLOOD FECES: CPT

## 2024-12-10 PROCEDURE — 99285 EMERGENCY DEPT VISIT HI MDM: CPT

## 2024-12-10 PROCEDURE — 36415 COLL VENOUS BLD VENIPUNCTURE: CPT

## 2024-12-10 PROCEDURE — 84484 ASSAY OF TROPONIN QUANT: CPT

## 2024-12-10 RX ORDER — LIDOCAINE 50 MG/G
1 PATCH TOPICAL DAILY
Status: ON HOLD | COMMUNITY
End: 2024-12-24 | Stop reason: HOSPADM

## 2024-12-10 RX ORDER — ONDANSETRON 2 MG/ML
4 INJECTION INTRAMUSCULAR; INTRAVENOUS ONCE
Status: COMPLETED | OUTPATIENT
Start: 2024-12-10 | End: 2024-12-10

## 2024-12-10 RX ORDER — MAGNESIUM SULFATE IN WATER 40 MG/ML
2000 INJECTION, SOLUTION INTRAVENOUS ONCE
Status: COMPLETED | OUTPATIENT
Start: 2024-12-10 | End: 2024-12-10

## 2024-12-10 RX ORDER — 0.9 % SODIUM CHLORIDE 0.9 %
1000 INTRAVENOUS SOLUTION INTRAVENOUS ONCE
Status: COMPLETED | OUTPATIENT
Start: 2024-12-10 | End: 2024-12-10

## 2024-12-10 RX ORDER — SODIUM CHLORIDE 9 MG/ML
1000 INJECTION, SOLUTION INTRAVENOUS CONTINUOUS
Status: DISCONTINUED | OUTPATIENT
Start: 2024-12-10 | End: 2024-12-11

## 2024-12-10 RX ORDER — SODIUM CHLORIDE 9 MG/ML
INJECTION, SOLUTION INTRAVENOUS PRN
Status: DISCONTINUED | OUTPATIENT
Start: 2024-12-10 | End: 2024-12-12

## 2024-12-10 RX ADMIN — ONDANSETRON 4 MG: 2 INJECTION INTRAMUSCULAR; INTRAVENOUS at 19:34

## 2024-12-10 RX ADMIN — HYDROMORPHONE HYDROCHLORIDE 0.5 MG: 1 INJECTION, SOLUTION INTRAMUSCULAR; INTRAVENOUS; SUBCUTANEOUS at 19:34

## 2024-12-10 RX ADMIN — SODIUM CHLORIDE 1000 ML: 0.9 INJECTION, SOLUTION INTRAVENOUS at 19:33

## 2024-12-10 RX ADMIN — HYDROMORPHONE HYDROCHLORIDE 1 MG: 1 INJECTION, SOLUTION INTRAMUSCULAR; INTRAVENOUS; SUBCUTANEOUS at 21:05

## 2024-12-10 RX ADMIN — MAGNESIUM SULFATE HEPTAHYDRATE 2000 MG: 40 INJECTION, SOLUTION INTRAVENOUS at 20:32

## 2024-12-10 RX ADMIN — SODIUM CHLORIDE 1000 ML: 9 INJECTION, SOLUTION INTRAVENOUS at 22:53

## 2024-12-10 ASSESSMENT — PAIN - FUNCTIONAL ASSESSMENT
PAIN_FUNCTIONAL_ASSESSMENT: 0-10
PAIN_FUNCTIONAL_ASSESSMENT: ACTIVITIES ARE NOT PREVENTED
PAIN_FUNCTIONAL_ASSESSMENT: 0-10

## 2024-12-10 ASSESSMENT — PAIN DESCRIPTION - LOCATION: LOCATION: GENERALIZED

## 2024-12-10 ASSESSMENT — PAIN SCALES - GENERAL
PAINLEVEL_OUTOF10: 3
PAINLEVEL_OUTOF10: 10
PAINLEVEL_OUTOF10: 10

## 2024-12-10 ASSESSMENT — LIFESTYLE VARIABLES
HOW OFTEN DO YOU HAVE A DRINK CONTAINING ALCOHOL: NEVER
HOW MANY STANDARD DRINKS CONTAINING ALCOHOL DO YOU HAVE ON A TYPICAL DAY: PATIENT DOES NOT DRINK

## 2024-12-11 PROBLEM — N17.9 ACUTE RENAL FAILURE (HCC): Status: ACTIVE | Noted: 2024-12-11

## 2024-12-11 PROBLEM — D64.9 ANEMIA: Status: ACTIVE | Noted: 2024-12-11

## 2024-12-11 PROBLEM — E43 SEVERE PROTEIN-CALORIE MALNUTRITION (HCC): Status: ACTIVE | Noted: 2024-12-11

## 2024-12-11 PROBLEM — E83.42 HYPOMAGNESEMIA: Status: ACTIVE | Noted: 2024-12-11

## 2024-12-11 PROBLEM — Z85.21 H/O LARYNGEAL CANCER: Status: ACTIVE | Noted: 2024-12-11

## 2024-12-11 PROBLEM — K56.7 ILEUS (HCC): Status: ACTIVE | Noted: 2024-12-11

## 2024-12-11 PROBLEM — E87.1 HYPONATREMIA: Status: ACTIVE | Noted: 2024-12-11

## 2024-12-11 PROBLEM — R23.3 PETECHIAL RASH: Status: ACTIVE | Noted: 2024-12-11

## 2024-12-11 LAB
ALBUMIN SERPL-MCNC: 2.2 G/DL (ref 3.4–5)
ALBUMIN/GLOB SERPL: 0.6 {RATIO} (ref 1.1–2.2)
ALP SERPL-CCNC: 89 U/L (ref 40–129)
ALT SERPL-CCNC: <5 U/L (ref 10–40)
ANION GAP SERPL CALCULATED.3IONS-SCNC: 12 MMOL/L (ref 3–16)
ANION GAP SERPL CALCULATED.3IONS-SCNC: 9 MMOL/L (ref 3–16)
AST SERPL-CCNC: 13 U/L (ref 15–37)
BASOPHILS # BLD: 0 K/UL (ref 0–0.2)
BASOPHILS NFR BLD: 0.3 %
BILIRUB SERPL-MCNC: 0.5 MG/DL (ref 0–1)
BUN SERPL-MCNC: 68 MG/DL (ref 7–20)
BUN SERPL-MCNC: 72 MG/DL (ref 7–20)
CALCIUM SERPL-MCNC: 7 MG/DL (ref 8.3–10.6)
CALCIUM SERPL-MCNC: 7.3 MG/DL (ref 8.3–10.6)
CHLORIDE SERPL-SCNC: 88 MMOL/L (ref 99–110)
CHLORIDE SERPL-SCNC: 93 MMOL/L (ref 99–110)
CO2 SERPL-SCNC: 20 MMOL/L (ref 21–32)
CO2 SERPL-SCNC: 22 MMOL/L (ref 21–32)
CREAT SERPL-MCNC: 4.7 MG/DL (ref 0.6–1.2)
CREAT SERPL-MCNC: 4.8 MG/DL (ref 0.6–1.2)
CRP SERPL-MCNC: 183 MG/L (ref 0–5.1)
DEPRECATED RDW RBC AUTO: 16.4 % (ref 12.4–15.4)
EKG ATRIAL RATE: 73 BPM
EKG DIAGNOSIS: NORMAL
EKG P AXIS: 33 DEGREES
EKG P-R INTERVAL: 142 MS
EKG Q-T INTERVAL: 408 MS
EKG QRS DURATION: 78 MS
EKG QTC CALCULATION (BAZETT): 449 MS
EKG R AXIS: 3 DEGREES
EKG T AXIS: 43 DEGREES
EKG VENTRICULAR RATE: 73 BPM
EOSINOPHIL # BLD: 0 K/UL (ref 0–0.6)
EOSINOPHIL NFR BLD: 0.3 %
ERYTHROCYTE [SEDIMENTATION RATE] IN BLOOD BY WESTERGREN METHOD: 36 MM/HR (ref 0–30)
FERRITIN SERPL IA-MCNC: 1122 NG/ML (ref 15–150)
FOLATE SERPL-MCNC: 4 NG/ML (ref 4.78–24.2)
GFR SERPLBLD CREATININE-BSD FMLA CKD-EPI: 10 ML/MIN/{1.73_M2}
GFR SERPLBLD CREATININE-BSD FMLA CKD-EPI: 9 ML/MIN/{1.73_M2}
GLUCOSE BLD-MCNC: 85 MG/DL (ref 70–99)
GLUCOSE BLD-MCNC: 91 MG/DL (ref 70–99)
GLUCOSE SERPL-MCNC: 76 MG/DL (ref 70–99)
GLUCOSE SERPL-MCNC: 83 MG/DL (ref 70–99)
HAPTOGLOB SERPL-MCNC: 256 MG/DL (ref 30–200)
HCT VFR BLD AUTO: 19.7 % (ref 36–48)
HCT VFR BLD AUTO: 20.1 % (ref 36–48)
HCT VFR BLD AUTO: 20.3 % (ref 36–48)
HCT VFR BLD AUTO: 22.6 % (ref 36–48)
HGB BLD-MCNC: 6.8 G/DL (ref 12–16)
HGB BLD-MCNC: 6.8 G/DL (ref 12–16)
HGB BLD-MCNC: 7.6 G/DL (ref 12–16)
IMMATURE RETIC FRACT: 0.25 (ref 0.21–0.37)
IRON SATN MFR SERPL: 33 % (ref 15–50)
IRON SERPL-MCNC: 38 UG/DL (ref 37–145)
LDH SERPL L TO P-CCNC: 140 U/L (ref 100–190)
LYMPHOCYTES # BLD: 0.4 K/UL (ref 1–5.1)
LYMPHOCYTES NFR BLD: 5.7 %
MAGNESIUM SERPL-MCNC: 1.69 MG/DL (ref 1.8–2.4)
MCH RBC QN AUTO: 29.7 PG (ref 26–34)
MCHC RBC AUTO-ENTMCNC: 34.4 G/DL (ref 31–36)
MCV RBC AUTO: 86.3 FL (ref 80–100)
MONOCYTES # BLD: 0.2 K/UL (ref 0–1.3)
MONOCYTES NFR BLD: 3.9 %
NEUTROPHILS # BLD: 5.6 K/UL (ref 1.7–7.7)
NEUTROPHILS NFR BLD: 89.8 %
PERFORMED ON: NORMAL
PERFORMED ON: NORMAL
PLATELET # BLD AUTO: 179 K/UL (ref 135–450)
PMV BLD AUTO: 7.2 FL (ref 5–10.5)
POTASSIUM SERPL-SCNC: 4.4 MMOL/L (ref 3.5–5.1)
POTASSIUM SERPL-SCNC: 4.6 MMOL/L (ref 3.5–5.1)
PROT SERPL-MCNC: 6 G/DL (ref 6.4–8.2)
RBC # BLD AUTO: 2.28 M/UL (ref 4–5.2)
RETICS # AUTO: 0.03 M/UL (ref 0.02–0.1)
RETICS/RBC NFR AUTO: 1.15 % (ref 0.5–2.18)
SODIUM SERPL-SCNC: 119 MMOL/L (ref 136–145)
SODIUM SERPL-SCNC: 125 MMOL/L (ref 136–145)
TIBC SERPL-MCNC: 114 UG/DL (ref 260–445)
VIT B12 SERPL-MCNC: 759 PG/ML (ref 211–911)
WBC # BLD AUTO: 6.2 K/UL (ref 4–11)

## 2024-12-11 PROCEDURE — 85014 HEMATOCRIT: CPT

## 2024-12-11 PROCEDURE — 99233 SBSQ HOSP IP/OBS HIGH 50: CPT | Performed by: INTERNAL MEDICINE

## 2024-12-11 PROCEDURE — 6370000000 HC RX 637 (ALT 250 FOR IP): Performed by: INTERNAL MEDICINE

## 2024-12-11 PROCEDURE — 97162 PT EVAL MOD COMPLEX 30 MIN: CPT

## 2024-12-11 PROCEDURE — 83550 IRON BINDING TEST: CPT

## 2024-12-11 PROCEDURE — 83615 LACTATE (LD) (LDH) ENZYME: CPT

## 2024-12-11 PROCEDURE — 85018 HEMOGLOBIN: CPT

## 2024-12-11 PROCEDURE — 99223 1ST HOSP IP/OBS HIGH 75: CPT | Performed by: INTERNAL MEDICINE

## 2024-12-11 PROCEDURE — 6360000002 HC RX W HCPCS: Performed by: INTERNAL MEDICINE

## 2024-12-11 PROCEDURE — 85652 RBC SED RATE AUTOMATED: CPT

## 2024-12-11 PROCEDURE — 94640 AIRWAY INHALATION TREATMENT: CPT

## 2024-12-11 PROCEDURE — 86036 ANCA SCREEN EACH ANTIBODY: CPT

## 2024-12-11 PROCEDURE — 85045 AUTOMATED RETICULOCYTE COUNT: CPT

## 2024-12-11 PROCEDURE — 93010 ELECTROCARDIOGRAM REPORT: CPT | Performed by: INTERNAL MEDICINE

## 2024-12-11 PROCEDURE — 2580000003 HC RX 258: Performed by: INTERNAL MEDICINE

## 2024-12-11 PROCEDURE — 82607 VITAMIN B-12: CPT

## 2024-12-11 PROCEDURE — 80053 COMPREHEN METABOLIC PANEL: CPT

## 2024-12-11 PROCEDURE — 83516 IMMUNOASSAY NONANTIBODY: CPT

## 2024-12-11 PROCEDURE — 83010 ASSAY OF HAPTOGLOBIN QUANT: CPT

## 2024-12-11 PROCEDURE — 2700000000 HC OXYGEN THERAPY PER DAY

## 2024-12-11 PROCEDURE — P9047 ALBUMIN (HUMAN), 25%, 50ML: HCPCS | Performed by: INTERNAL MEDICINE

## 2024-12-11 PROCEDURE — 86038 ANTINUCLEAR ANTIBODIES: CPT

## 2024-12-11 PROCEDURE — 36430 TRANSFUSION BLD/BLD COMPNT: CPT

## 2024-12-11 PROCEDURE — 82728 ASSAY OF FERRITIN: CPT

## 2024-12-11 PROCEDURE — 51702 INSERT TEMP BLADDER CATH: CPT

## 2024-12-11 PROCEDURE — 83735 ASSAY OF MAGNESIUM: CPT

## 2024-12-11 PROCEDURE — 97530 THERAPEUTIC ACTIVITIES: CPT

## 2024-12-11 PROCEDURE — 85025 COMPLETE CBC W/AUTO DIFF WBC: CPT

## 2024-12-11 PROCEDURE — 2500000003 HC RX 250 WO HCPCS: Performed by: INTERNAL MEDICINE

## 2024-12-11 PROCEDURE — 92610 EVALUATE SWALLOWING FUNCTION: CPT

## 2024-12-11 PROCEDURE — 2580000003 HC RX 258: Performed by: PHYSICIAN ASSISTANT

## 2024-12-11 PROCEDURE — 36415 COLL VENOUS BLD VENIPUNCTURE: CPT

## 2024-12-11 PROCEDURE — 2000000000 HC ICU R&B

## 2024-12-11 PROCEDURE — 82746 ASSAY OF FOLIC ACID SERUM: CPT

## 2024-12-11 PROCEDURE — 86160 COMPLEMENT ANTIGEN: CPT

## 2024-12-11 PROCEDURE — 92526 ORAL FUNCTION THERAPY: CPT

## 2024-12-11 PROCEDURE — 97166 OT EVAL MOD COMPLEX 45 MIN: CPT

## 2024-12-11 PROCEDURE — 86140 C-REACTIVE PROTEIN: CPT

## 2024-12-11 PROCEDURE — 94761 N-INVAS EAR/PLS OXIMETRY MLT: CPT

## 2024-12-11 PROCEDURE — 83540 ASSAY OF IRON: CPT

## 2024-12-11 RX ORDER — MUPIROCIN 20 MG/G
OINTMENT TOPICAL 2 TIMES DAILY
Status: DISPENSED | OUTPATIENT
Start: 2024-12-11 | End: 2024-12-16

## 2024-12-11 RX ORDER — ACETAMINOPHEN 650 MG/1
650 SUPPOSITORY RECTAL EVERY 6 HOURS PRN
Status: DISCONTINUED | OUTPATIENT
Start: 2024-12-11 | End: 2024-12-24 | Stop reason: HOSPADM

## 2024-12-11 RX ORDER — ONDANSETRON 2 MG/ML
4 INJECTION INTRAMUSCULAR; INTRAVENOUS EVERY 6 HOURS PRN
Status: DISCONTINUED | OUTPATIENT
Start: 2024-12-11 | End: 2024-12-24 | Stop reason: HOSPADM

## 2024-12-11 RX ORDER — ALBUTEROL SULFATE 90 UG/1
2 INHALANT RESPIRATORY (INHALATION) EVERY 4 HOURS PRN
Status: DISCONTINUED | OUTPATIENT
Start: 2024-12-11 | End: 2024-12-24 | Stop reason: HOSPADM

## 2024-12-11 RX ORDER — MAGNESIUM SULFATE IN WATER 40 MG/ML
2000 INJECTION, SOLUTION INTRAVENOUS PRN
Status: DISCONTINUED | OUTPATIENT
Start: 2024-12-11 | End: 2024-12-11

## 2024-12-11 RX ORDER — SODIUM CHLORIDE 9 MG/ML
INJECTION, SOLUTION INTRAVENOUS PRN
Status: DISCONTINUED | OUTPATIENT
Start: 2024-12-11 | End: 2024-12-24 | Stop reason: HOSPADM

## 2024-12-11 RX ORDER — ACETAMINOPHEN 325 MG/1
650 TABLET ORAL EVERY 6 HOURS PRN
Status: DISCONTINUED | OUTPATIENT
Start: 2024-12-11 | End: 2024-12-24 | Stop reason: HOSPADM

## 2024-12-11 RX ORDER — LIDOCAINE 4 G/G
1 PATCH TOPICAL DAILY
Status: DISCONTINUED | OUTPATIENT
Start: 2024-12-12 | End: 2024-12-24 | Stop reason: HOSPADM

## 2024-12-11 RX ORDER — ONDANSETRON 4 MG/1
4 TABLET, ORALLY DISINTEGRATING ORAL EVERY 8 HOURS PRN
Status: DISCONTINUED | OUTPATIENT
Start: 2024-12-11 | End: 2024-12-24 | Stop reason: HOSPADM

## 2024-12-11 RX ORDER — SODIUM CHLORIDE 0.9 % (FLUSH) 0.9 %
5-40 SYRINGE (ML) INJECTION EVERY 12 HOURS SCHEDULED
Status: DISCONTINUED | OUTPATIENT
Start: 2024-12-11 | End: 2024-12-24 | Stop reason: HOSPADM

## 2024-12-11 RX ORDER — SODIUM CHLORIDE 0.9 % (FLUSH) 0.9 %
5-40 SYRINGE (ML) INJECTION PRN
Status: DISCONTINUED | OUTPATIENT
Start: 2024-12-11 | End: 2024-12-24 | Stop reason: HOSPADM

## 2024-12-11 RX ORDER — ALBUMIN (HUMAN) 12.5 G/50ML
25 SOLUTION INTRAVENOUS EVERY 8 HOURS
Status: COMPLETED | OUTPATIENT
Start: 2024-12-11 | End: 2024-12-13

## 2024-12-11 RX ORDER — HEPARIN SODIUM 5000 [USP'U]/ML
5000 INJECTION, SOLUTION INTRAVENOUS; SUBCUTANEOUS EVERY 8 HOURS SCHEDULED
Status: DISCONTINUED | OUTPATIENT
Start: 2024-12-11 | End: 2024-12-24 | Stop reason: HOSPADM

## 2024-12-11 RX ORDER — POLYETHYLENE GLYCOL 3350 17 G/17G
17 POWDER, FOR SOLUTION ORAL DAILY PRN
Status: DISCONTINUED | OUTPATIENT
Start: 2024-12-11 | End: 2024-12-17

## 2024-12-11 RX ORDER — OXYCODONE AND ACETAMINOPHEN 5; 325 MG/1; MG/1
1 TABLET ORAL EVERY 4 HOURS PRN
Status: DISCONTINUED | OUTPATIENT
Start: 2024-12-11 | End: 2024-12-12

## 2024-12-11 RX ORDER — POTASSIUM CHLORIDE 750 MG/1
40 TABLET, EXTENDED RELEASE ORAL PRN
Status: DISCONTINUED | OUTPATIENT
Start: 2024-12-11 | End: 2024-12-11

## 2024-12-11 RX ORDER — MIDODRINE HYDROCHLORIDE 5 MG/1
5 TABLET ORAL
Status: DISCONTINUED | OUTPATIENT
Start: 2024-12-11 | End: 2024-12-12

## 2024-12-11 RX ORDER — POTASSIUM CHLORIDE 7.45 MG/ML
10 INJECTION INTRAVENOUS PRN
Status: DISCONTINUED | OUTPATIENT
Start: 2024-12-11 | End: 2024-12-11

## 2024-12-11 RX ORDER — POLYETHYLENE GLYCOL 3350 17 G/17G
17 POWDER, FOR SOLUTION ORAL 2 TIMES DAILY
Status: DISCONTINUED | OUTPATIENT
Start: 2024-12-11 | End: 2024-12-24 | Stop reason: HOSPADM

## 2024-12-11 RX ORDER — SODIUM CHLORIDE 9 MG/ML
INJECTION, SOLUTION INTRAVENOUS PRN
Status: DISCONTINUED | OUTPATIENT
Start: 2024-12-11 | End: 2024-12-12

## 2024-12-11 RX ADMIN — HYDROMORPHONE HYDROCHLORIDE 0.5 MG: 1 INJECTION, SOLUTION INTRAMUSCULAR; INTRAVENOUS; SUBCUTANEOUS at 01:02

## 2024-12-11 RX ADMIN — HYDROMORPHONE HYDROCHLORIDE 0.5 MG: 1 INJECTION, SOLUTION INTRAMUSCULAR; INTRAVENOUS; SUBCUTANEOUS at 06:08

## 2024-12-11 RX ADMIN — Medication: at 01:01

## 2024-12-11 RX ADMIN — SODIUM CHLORIDE, PRESERVATIVE FREE 10 ML: 5 INJECTION INTRAVENOUS at 20:26

## 2024-12-11 RX ADMIN — ALBUMIN (HUMAN) 25 G: 0.25 INJECTION, SOLUTION INTRAVENOUS at 14:29

## 2024-12-11 RX ADMIN — OXYCODONE HYDROCHLORIDE AND ACETAMINOPHEN 1 TABLET: 5; 325 TABLET ORAL at 14:33

## 2024-12-11 RX ADMIN — OXYCODONE HYDROCHLORIDE AND ACETAMINOPHEN 1 TABLET: 5; 325 TABLET ORAL at 10:41

## 2024-12-11 RX ADMIN — MUPIROCIN: 20 OINTMENT TOPICAL at 10:41

## 2024-12-11 RX ADMIN — Medication 2 PUFF: at 14:31

## 2024-12-11 RX ADMIN — Medication 2 PUFF: at 20:30

## 2024-12-11 RX ADMIN — ALBUMIN (HUMAN) 25 G: 0.25 INJECTION, SOLUTION INTRAVENOUS at 20:41

## 2024-12-11 RX ADMIN — MIDODRINE HYDROCHLORIDE 5 MG: 5 TABLET ORAL at 18:00

## 2024-12-11 RX ADMIN — HEPARIN SODIUM 5000 UNITS: 5000 INJECTION INTRAVENOUS; SUBCUTANEOUS at 14:45

## 2024-12-11 RX ADMIN — POLYETHYLENE GLYCOL (3350) 17 G: 17 POWDER, FOR SOLUTION ORAL at 20:25

## 2024-12-11 RX ADMIN — HEPARIN SODIUM 5000 UNITS: 5000 INJECTION INTRAVENOUS; SUBCUTANEOUS at 20:25

## 2024-12-11 RX ADMIN — HYDROMORPHONE HYDROCHLORIDE 1 MG: 1 INJECTION, SOLUTION INTRAMUSCULAR; INTRAVENOUS; SUBCUTANEOUS at 21:00

## 2024-12-11 RX ADMIN — SODIUM CHLORIDE 1000 ML: 9 INJECTION, SOLUTION INTRAVENOUS at 10:49

## 2024-12-11 RX ADMIN — VASOPRESSIN: 20 INJECTION, SOLUTION INTRAVENOUS at 14:23

## 2024-12-11 ASSESSMENT — PAIN SCALES - WONG BAKER: WONGBAKER_NUMERICALRESPONSE: HURTS LITTLE MORE

## 2024-12-11 ASSESSMENT — PAIN DESCRIPTION - DESCRIPTORS
DESCRIPTORS: ACHING
DESCRIPTORS: ACHING
DESCRIPTORS: ACHING;SHARP
DESCRIPTORS: SPASM;STABBING;SHARP
DESCRIPTORS: ACHING;BURNING;SHARP

## 2024-12-11 ASSESSMENT — PAIN DESCRIPTION - LOCATION
LOCATION: FOOT
LOCATION: GENERALIZED

## 2024-12-11 ASSESSMENT — PAIN - FUNCTIONAL ASSESSMENT
PAIN_FUNCTIONAL_ASSESSMENT: ACTIVITIES ARE NOT PREVENTED
PAIN_FUNCTIONAL_ASSESSMENT: PREVENTS OR INTERFERES WITH ALL ACTIVE AND SOME PASSIVE ACTIVITIES

## 2024-12-11 ASSESSMENT — PAIN SCALES - GENERAL
PAINLEVEL_OUTOF10: 9
PAINLEVEL_OUTOF10: 3
PAINLEVEL_OUTOF10: 4
PAINLEVEL_OUTOF10: 8
PAINLEVEL_OUTOF10: 4
PAINLEVEL_OUTOF10: 10
PAINLEVEL_OUTOF10: 4

## 2024-12-11 ASSESSMENT — PAIN DESCRIPTION - ORIENTATION
ORIENTATION: RIGHT;LEFT;MID;POSTERIOR
ORIENTATION: RIGHT;LEFT

## 2024-12-11 NOTE — H&P
Hospital Medicine History & Physical      Patient Name: Emily Alegre    : 1955    PCP: Shoshana Mitchell MD    Date of Service:  Patient seen and examined on 12/10/24     Chief Complaint: Dehydration    History Of Present Illness:    Emily Alegre is a 69 y.o. female with a history of laryngeal cancer status post tracheostomy who presented to ED with complaint of dehydration.    Of note patient currently resides at home previously at a nursing facility.  Patient endorses poor oral intake with associated generalized weakness and body petechia.  States that she is not eating and drinking much complains of associated abdominal pain with some loose stools and bilateral lower extremity edema..  According to daughter, she is concerned that she can no longer take care of herself at home.  Might benefit from long term care.  Blood pressure 96/57 pulse 74 temperature 98.0 respiration 18 saturating 96% on NC oxygen  Labs show sodium of 122 chloride of 87 BUN of 68 creatinine of 4.7 magnesium of 1.46 troponin of 27 low albumin.  WBC of 7.0 hemoglobin of 6.6 hematocrit of 19.5.  Stool occult negative  EKG shows sinus rhythm with premature atrial complex  CT head shows no acute findings.  CT abdomen shows mild ileus, solid nodule in the right middle lobe measuring 7 mm, cholelithiasis  In the ED patient received Dilaudid, 2 g of magnesium sulfate, Zofran, 1 L bolus of NS.  Currently on 0.9% NS at 75 cc.  Patient is receiving 1 unit PRBC.  Nephrology consulted admission to the ICU.      Past Medical History:    Patient has a past medical history of Asthma, Chronic bronchiolitis (HCC), and Throat cancer (HCC).    Past Surgical History:    Patient has a past surgical history that includes Uterine Suspension;  section; and tracheostomy (2024).    Medications Prior to Admission:      Prior to Admission medications    Medication Sig Start Date End Date Taking? Authorizing Provider   lidocaine

## 2024-12-11 NOTE — CONSULTS
[facility]    Consult Note      Requesting Physician:  Dr. Kelsey    CHIEF COMPLAINT:  weakness       HISTORY OF PRESENT ILLNESS:    Ms. Alegre  is a 69 y.o. female we are seeing in consultation for anemia and history of laryngeal cancer. She sees oncology at [facility] and completed chemoradiation for stage KASSANDRA laryngeal cancer 2024. She achieved an excellent response but has had a persistent anemia. She was transfused last month and brought in again with FTT and Hgb was 6.6. She reports no bleeding symptoms. Her creatinine is elevated. She also developed a petechial rash on her body that she states she had once before and it went away with steroids.         ICD-10-CM    1. Acute renal failure, unspecified acute renal failure type (HCC)  N17.9       2. Hypomagnesemia  E83.42       3. Hyponatremia  E87.1       4. Ileus (HCC)  K56.7       5. Right arm weakness  R29.898       6. Dehydration  E86.0       7. Failure to thrive in adult  R62.7       8. Anemia, unspecified type  D64.9              Past Medical History:  Past Medical History:   Diagnosis Date    Asthma     Chronic bronchiolitis (HCC)     Throat cancer (HCC)        Past Surgical History:  Past Surgical History:   Procedure Laterality Date     SECTION      TRACHEOSTOMY  2024    UTERINE SUSPENSION         Current Medications:  Current Facility-Administered Medications   Medication Dose Route Frequency Provider Last Rate Last Admin    sodium chloride flush 0.9 % injection 5-40 mL  5-40 mL IntraVENous 2 times per day Magan Mayes MD        sodium chloride flush 0.9 % injection 5-40 mL  5-40 mL IntraVENous PRN Magan Mayes MD        0.9 % sodium chloride infusion   IntraVENous PRN Magan Mayes MD        ondansetron (ZOFRAN-ODT) disintegrating tablet 4 mg  4 mg Oral Q8H PRN Magan Mayes MD        Or

## 2024-12-11 NOTE — ED NOTES
2125: Called oncall nephro and left stat consult       2138: Dr. Stanford called and spoke to Jaz Landis

## 2024-12-11 NOTE — CARE COORDINATION
Received a message from Cleo/nutritionist that [facility] would like a call back from .     [phone]    12:59 - LVM for [place] advising of 's contact information.

## 2024-12-11 NOTE — ED PROVIDER NOTES
[facility] ED  EMERGENCY DEPARTMENT ENCOUNTER        Pt Name: Emily Alegre  MRN: [id number]  Birthdate 1955  Date of evaluation: 12/10/2024  Provider: Jaz Landis PA-C  PCP: Shoshana Mitchell MD  Note Started: 7:06 PM EST 12/10/24      ARIA. I have evaluated this patient.  With my attending physician Dr. Guerrero      CHIEF COMPLAINT       Chief Complaint   Patient presents with    Dehydration     Pt. Reports dehydration, unable to eat or drink very much, pt. Has history of larynx cancer, pt. Also reports BLE edema, petechiae to entire body. Niece reports that pt. Is unable to take care of herself and needs nursing home placement.        HISTORY OF PRESENT ILLNESS: 1 or more Elements     History From: Patient and daughter            Chief Complaint: Dehydration    Emily Alegre is a 69 y.o. female who presents daughter tells me that she had laryngeal cancer and had a tracheostomy that was removed about a month ago.  She was staying at [facility] and was released on Sunday.  Although the daughter is very concerned because she can no longer take care of herself at home, she is living with her brother and is upstairs alone most of the day.  She has been deteriorating over the past couple of days.  She has been complaining of pain all over her body petechiae weakness dehydration.  She is not eating or drinking much water.  She is urinating once a day.  She had some diarrhea this morning.  The daughter states she is been complaining of abdominal pain when she bends over or tries to move it all.  She has a large hernia.  She denies any fever shortness of breath cough.  She has been dealing with right arm weakness and can no longer move her arm at all, she was told this was from a rotator cuff injury and this started about 6 weeks ago.    Nursing Notes were all reviewed and agreed with or any disagreements were addressed in the HPI.    REVIEW OF SYSTEMS :      Review of Systems    Positives and

## 2024-12-11 NOTE — ED NOTES
Blood transfusion started at 2101. Pt monitored continuously with RN at bedside for first 15 minutes. No s/s of reaction noted. Rate increased from 60 ml/hr to 120 ml/hr.

## 2024-12-11 NOTE — CARE COORDINATION
Received a call from [facility]. [facility] was to start service with the patient who recently discharged from [facility] in [place] (by [facility]). The patient was admitted to [facility] before [facility] could meet with the patient.     They will follow the patient once she discharges from [facility].     [facility]  [phone]

## 2024-12-11 NOTE — CONSENT
Informed Consent for Blood Component Transfusion Note    I have discussed with the patient and daughter the rationale for blood component transfusion; its benefits in treating or preventing fatigue, organ damage, or death; and its risk which includes mild transfusion reactions, rare risk of blood borne infection, or more serious but rare reactions. I have discussed the alternatives to transfusion, including the risk and consequences of not receiving transfusion. The patient and daughter had an opportunity to ask questions and had agreed to proceed with transfusion of blood components.    Electronically signed by Jaz Landis PA-C on 12/10/24 at 7:28 PM EST

## 2024-12-11 NOTE — CONSULTS
Reason for referral and CC: Hyponatremia    HISTORY OF PRESENT ILLNESS: 70 yo female with a history of laryngeal cancer treated with chemotherapy and radiation earlier in the year.  She was decannulated by her ENT.  She still has a patent stoma that expresses sputum.  She is able to self occlude to vocalize with no problem.  It appears she has been failing to thrive and not being very ambulatory or eating well at home.  Her patient arrived to the ED with a complaint of dehydration was found to have significant hyponatremia admitted to the ICU.  She also has a purpuric rash on her legs more than her hands and arms.  She notes she has had this in the past as well Solu-Medrol.  She was not given a diagnosis for this rash.  It complains of pain all over more in her bottom and sacrum from being in bed but also some in her feet and some in her hands and wrists.  She request Dilaudid.      Past Medical History:   Diagnosis Date    Asthma     Chronic bronchiolitis (HCC)     Throat cancer (HCC)      Past Surgical History:   Procedure Laterality Date     SECTION      TRACHEOSTOMY  2024    UTERINE SUSPENSION       Family History  family history is not on file.    Social History:  reports that she has quit smoking. Her smoking use included cigarettes. She has never used smokeless tobacco.   reports no history of alcohol use.    ALLERGIES:  Patient has No Known Allergies.  Continuous Infusions:   sodium chloride      sodium chloride      sodium chloride 75 mL/hr at 24 0530     Scheduled Meds:   sodium chloride flush  5-40 mL IntraVENous 2 times per day     PRN Meds:  sodium chloride flush, sodium chloride, ondansetron **OR** ondansetron, polyethylene glycol, acetaminophen **OR** acetaminophen, chapstick, sodium chloride    REVIEW OF SYSTEMS:  Constitutional: Negative for fever  HENT: Negative for sore throat  Eyes: Negative for redness   Respiratory: Negative for dyspnea, cough  Cardiovascular: Negative for

## 2024-12-11 NOTE — ED PROVIDER NOTES
I independently examined and evaluated Emily Alegre.    In brief, patient is a 69-year-old female presents to the emergency department for concern of dehydration.  Patient reports having history of laryngeal cancer and had tracheostomy that was removed about a month ago.  She was at [facility] and released on Sunday.  My assessment, patient appears chronically ill.  She denies having any symptoms.  She was borderline hypotensive with blood pressure of 104/62.  Hemoglobin is 6.6, slightly worse compared to 6.9 on 11/04/2024.  She was ordered 1 unit of blood. Cr is 4.7 worse compared to 1.4 at baseline. Nephrology consulted. Hospitalist consulted for admission for further evaluation and treatment. Admit.     The Ekg interpreted by me shows  Sinus rhythm with premature atrial complexes with a rate of 73  Axis is normal  QTc is acceptable at 449  Intervals and Durations are unremarkable.      ST Segments: Nonspecific ST changes    All diagnostic, treatment, and disposition decisions were made by myself in conjunction with the advanced practice provider/resident physician.     I personally saw the patient and performed a substantive portion of the visit including aspects of the medical decision making. I approved management plan and take responsibility for the patient management.     I personally saw the patient and independently provided 10 minutes of non-concurrent critical care out of the total shared critical care time provided.    Comment: Please note this report has been produced using speech recognition software and may contain errors related to that system including errors in grammar, punctuation, and spelling, as well as words and phrases that may be inappropriate. If there are any questions or concerns please feel free to contact the dictating provider for clarification.    For all further details of the patient's emergency department visit, please see the advanced practice provider's documentation.

## 2024-12-11 NOTE — CONSENT
Informed Consent for Blood Component Transfusion Note    I have discussed with the patient the rationale for blood component transfusion; its benefits in treating or preventing fatigue, organ damage, or death; and its risk which includes mild transfusion reactions, rare risk of blood borne infection, or more serious but rare reactions. I have discussed the alternatives to transfusion, including the risk and consequences of not receiving transfusion. The patient had an opportunity to ask questions and had agreed to proceed with transfusion of blood components.    Electronically signed by Juan Kelsey MD on 12/11/24 at 8:44 AM EST

## 2024-12-12 LAB
ANA SER QL IA: NEGATIVE
ANION GAP SERPL CALCULATED.3IONS-SCNC: 12 MMOL/L (ref 3–16)
ANION GAP SERPL CALCULATED.3IONS-SCNC: 13 MMOL/L (ref 3–16)
BLOOD BANK DISPENSE STATUS: NORMAL
BLOOD BANK DISPENSE STATUS: NORMAL
BLOOD BANK PRODUCT CODE: NORMAL
BLOOD BANK PRODUCT CODE: NORMAL
BPU ID: NORMAL
BPU ID: NORMAL
BUN SERPL-MCNC: 68 MG/DL (ref 7–20)
BUN SERPL-MCNC: 73 MG/DL (ref 7–20)
BUN SERPL-MCNC: 73 MG/DL (ref 7–20)
BUN SERPL-MCNC: 74 MG/DL (ref 7–20)
C3 SERPL-MCNC: 90.3 MG/DL (ref 90–180)
C4 SERPL-MCNC: 18.9 MG/DL (ref 10–40)
CALCIUM SERPL-MCNC: 7 MG/DL (ref 8.3–10.6)
CALCIUM SERPL-MCNC: 7 MG/DL (ref 8.3–10.6)
CALCIUM SERPL-MCNC: 7.1 MG/DL (ref 8.3–10.6)
CALCIUM SERPL-MCNC: 7.2 MG/DL (ref 8.3–10.6)
CHLORIDE SERPL-SCNC: 94 MMOL/L (ref 99–110)
CHLORIDE SERPL-SCNC: 95 MMOL/L (ref 99–110)
CHLORIDE SERPL-SCNC: 96 MMOL/L (ref 99–110)
CHLORIDE SERPL-SCNC: 96 MMOL/L (ref 99–110)
CO2 SERPL-SCNC: 19 MMOL/L (ref 21–32)
CO2 SERPL-SCNC: 20 MMOL/L (ref 21–32)
CREAT SERPL-MCNC: 4.7 MG/DL (ref 0.6–1.2)
CREAT SERPL-MCNC: 4.8 MG/DL (ref 0.6–1.2)
CREAT SERPL-MCNC: 4.9 MG/DL (ref 0.6–1.2)
CREAT SERPL-MCNC: 5 MG/DL (ref 0.6–1.2)
DAT POLY-SP REAG RBC QL: NORMAL
DEPRECATED RDW RBC AUTO: 17.1 % (ref 12.4–15.4)
DESCRIPTION BLOOD BANK: NORMAL
DESCRIPTION BLOOD BANK: NORMAL
GFR SERPLBLD CREATININE-BSD FMLA CKD-EPI: 10 ML/MIN/{1.73_M2}
GFR SERPLBLD CREATININE-BSD FMLA CKD-EPI: 9 ML/MIN/{1.73_M2}
GLUCOSE SERPL-MCNC: 79 MG/DL (ref 70–99)
GLUCOSE SERPL-MCNC: 82 MG/DL (ref 70–99)
GLUCOSE SERPL-MCNC: 83 MG/DL (ref 70–99)
GLUCOSE SERPL-MCNC: 92 MG/DL (ref 70–99)
HCT VFR BLD AUTO: 20.7 % (ref 36–48)
HGB BLD-MCNC: 6.9 G/DL (ref 12–16)
MCH RBC QN AUTO: 29 PG (ref 26–34)
MCHC RBC AUTO-ENTMCNC: 33.3 G/DL (ref 31–36)
MCV RBC AUTO: 87.3 FL (ref 80–100)
PLATELET # BLD AUTO: 159 K/UL (ref 135–450)
PMV BLD AUTO: 7.1 FL (ref 5–10.5)
POTASSIUM SERPL-SCNC: 4.5 MMOL/L (ref 3.5–5.1)
POTASSIUM SERPL-SCNC: 4.6 MMOL/L (ref 3.5–5.1)
POTASSIUM SERPL-SCNC: 4.7 MMOL/L (ref 3.5–5.1)
POTASSIUM SERPL-SCNC: 4.9 MMOL/L (ref 3.5–5.1)
RBC # BLD AUTO: 2.37 M/UL (ref 4–5.2)
SODIUM SERPL-SCNC: 126 MMOL/L (ref 136–145)
SODIUM SERPL-SCNC: 127 MMOL/L (ref 136–145)
SODIUM SERPL-SCNC: 128 MMOL/L (ref 136–145)
SODIUM SERPL-SCNC: 128 MMOL/L (ref 136–145)
WBC # BLD AUTO: 8.3 K/UL (ref 4–11)

## 2024-12-12 PROCEDURE — P9047 ALBUMIN (HUMAN), 25%, 50ML: HCPCS | Performed by: INTERNAL MEDICINE

## 2024-12-12 PROCEDURE — 94761 N-INVAS EAR/PLS OXIMETRY MLT: CPT

## 2024-12-12 PROCEDURE — 36430 TRANSFUSION BLD/BLD COMPNT: CPT

## 2024-12-12 PROCEDURE — 92526 ORAL FUNCTION THERAPY: CPT

## 2024-12-12 PROCEDURE — 2580000003 HC RX 258: Performed by: INTERNAL MEDICINE

## 2024-12-12 PROCEDURE — 2500000003 HC RX 250 WO HCPCS: Performed by: INTERNAL MEDICINE

## 2024-12-12 PROCEDURE — 36415 COLL VENOUS BLD VENIPUNCTURE: CPT

## 2024-12-12 PROCEDURE — 80048 BASIC METABOLIC PNL TOTAL CA: CPT

## 2024-12-12 PROCEDURE — 6370000000 HC RX 637 (ALT 250 FOR IP): Performed by: INTERNAL MEDICINE

## 2024-12-12 PROCEDURE — 2060000000 HC ICU INTERMEDIATE R&B

## 2024-12-12 PROCEDURE — 6360000002 HC RX W HCPCS: Performed by: INTERNAL MEDICINE

## 2024-12-12 PROCEDURE — 99233 SBSQ HOSP IP/OBS HIGH 50: CPT | Performed by: INTERNAL MEDICINE

## 2024-12-12 PROCEDURE — 2700000000 HC OXYGEN THERAPY PER DAY

## 2024-12-12 PROCEDURE — 85027 COMPLETE CBC AUTOMATED: CPT

## 2024-12-12 RX ORDER — MIDODRINE HYDROCHLORIDE 10 MG/1
10 TABLET ORAL
Status: DISCONTINUED | OUTPATIENT
Start: 2024-12-12 | End: 2024-12-24 | Stop reason: HOSPADM

## 2024-12-12 RX ORDER — SODIUM CHLORIDE 9 MG/ML
INJECTION, SOLUTION INTRAVENOUS PRN
Status: DISCONTINUED | OUTPATIENT
Start: 2024-12-12 | End: 2024-12-21

## 2024-12-12 RX ORDER — HYDROCODONE BITARTRATE AND ACETAMINOPHEN 5; 325 MG/1; MG/1
1 TABLET ORAL EVERY 6 HOURS PRN
Status: DISCONTINUED | OUTPATIENT
Start: 2024-12-12 | End: 2024-12-16

## 2024-12-12 RX ORDER — FOLIC ACID 1 MG/1
1 TABLET ORAL DAILY
Status: DISCONTINUED | OUTPATIENT
Start: 2024-12-12 | End: 2024-12-24 | Stop reason: HOSPADM

## 2024-12-12 RX ORDER — SODIUM CHLORIDE 9 MG/ML
INJECTION, SOLUTION INTRAVENOUS PRN
Status: DISCONTINUED | OUTPATIENT
Start: 2024-12-12 | End: 2024-12-12

## 2024-12-12 RX ADMIN — ALBUMIN (HUMAN) 25 G: 0.25 INJECTION, SOLUTION INTRAVENOUS at 14:48

## 2024-12-12 RX ADMIN — ALBUMIN (HUMAN) 25 G: 0.25 INJECTION, SOLUTION INTRAVENOUS at 23:11

## 2024-12-12 RX ADMIN — SODIUM CHLORIDE, PRESERVATIVE FREE 10 ML: 5 INJECTION INTRAVENOUS at 09:00

## 2024-12-12 RX ADMIN — MIDODRINE HYDROCHLORIDE 10 MG: 10 TABLET ORAL at 12:04

## 2024-12-12 RX ADMIN — ALBUMIN (HUMAN) 25 G: 0.25 INJECTION, SOLUTION INTRAVENOUS at 07:05

## 2024-12-12 RX ADMIN — POLYETHYLENE GLYCOL (3350) 17 G: 17 POWDER, FOR SOLUTION ORAL at 09:21

## 2024-12-12 RX ADMIN — HYDROCODONE BITARTRATE AND ACETAMINOPHEN 1 TABLET: 5; 325 TABLET ORAL at 20:41

## 2024-12-12 RX ADMIN — MUPIROCIN: 20 OINTMENT TOPICAL at 09:22

## 2024-12-12 RX ADMIN — HYDROMORPHONE HYDROCHLORIDE 1 MG: 1 INJECTION, SOLUTION INTRAMUSCULAR; INTRAVENOUS; SUBCUTANEOUS at 09:20

## 2024-12-12 RX ADMIN — FOLIC ACID 1 MG: 1 TABLET ORAL at 09:21

## 2024-12-12 RX ADMIN — MIDODRINE HYDROCHLORIDE 10 MG: 10 TABLET ORAL at 18:55

## 2024-12-12 RX ADMIN — HYDROMORPHONE HYDROCHLORIDE 1 MG: 1 INJECTION, SOLUTION INTRAMUSCULAR; INTRAVENOUS; SUBCUTANEOUS at 04:45

## 2024-12-12 RX ADMIN — SODIUM BICARBONATE: 84 INJECTION, SOLUTION INTRAVENOUS at 12:13

## 2024-12-12 RX ADMIN — MIDODRINE HYDROCHLORIDE 5 MG: 5 TABLET ORAL at 09:20

## 2024-12-12 RX ADMIN — HYDROCODONE BITARTRATE AND ACETAMINOPHEN 1 TABLET: 5; 325 TABLET ORAL at 14:13

## 2024-12-12 RX ADMIN — VASOPRESSIN: 20 INJECTION, SOLUTION INTRAVENOUS at 08:24

## 2024-12-12 RX ADMIN — SODIUM CHLORIDE, PRESERVATIVE FREE 10 ML: 5 INJECTION INTRAVENOUS at 23:12

## 2024-12-12 RX ADMIN — WATER 125 MG: 1 INJECTION INTRAMUSCULAR; INTRAVENOUS; SUBCUTANEOUS at 14:56

## 2024-12-12 ASSESSMENT — PAIN SCALES - GENERAL
PAINLEVEL_OUTOF10: 5
PAINLEVEL_OUTOF10: 10
PAINLEVEL_OUTOF10: 5
PAINLEVEL_OUTOF10: 9
PAINLEVEL_OUTOF10: 10
PAINLEVEL_OUTOF10: 10
PAINLEVEL_OUTOF10: 5

## 2024-12-12 ASSESSMENT — PAIN DESCRIPTION - LOCATION
LOCATION: GENERALIZED
LOCATION: TEETH;JAW
LOCATION: GENERALIZED
LOCATION: GENERALIZED

## 2024-12-12 ASSESSMENT — PAIN DESCRIPTION - DESCRIPTORS
DESCRIPTORS: ACHING

## 2024-12-12 ASSESSMENT — PAIN - FUNCTIONAL ASSESSMENT: PAIN_FUNCTIONAL_ASSESSMENT: PREVENTS OR INTERFERES WITH MANY ACTIVE NOT PASSIVE ACTIVITIES

## 2024-12-12 ASSESSMENT — PAIN SCALES - WONG BAKER: WONGBAKER_NUMERICALRESPONSE: HURTS A LITTLE BIT

## 2024-12-12 ASSESSMENT — PAIN DESCRIPTION - ORIENTATION: ORIENTATION: RIGHT

## 2024-12-12 NOTE — CARE COORDINATION
Case Management Assessment  Initial Evaluation    Date/Time of Evaluation: 12/12/2024 5:04 PM  Assessment Completed by: Teresa Boeck, RN    If patient is discharged prior to next notation, then this note serves as note for discharge by case management.    Patient Name: Emily Alegre                   YOB: 1955  Diagnosis: Adult failure to thrive [R62.7]  Dehydration [E86.0]  Hypomagnesemia [E83.42]  Hyponatremia [E87.1]  Ileus (HCC) [K56.7]  Right arm weakness [R29.898]  Failure to thrive in adult [R62.7]  Acute renal failure, unspecified acute renal failure type (HCC) [N17.9]  Anemia, unspecified type [D64.9]                   Date / Time: 12/10/2024  6:14 PM    Patient Admission Status: Inpatient   Readmission Risk (Low < 19, Mod (19-27), High > 27): Readmission Risk Score: 16.8    Current PCP: Shoshana Mitchell MD  PCP verified by CM? (P) Yes    Chart Reviewed: Yes      History Provided by: Patient  Patient Orientation: Alert and Oriented, Person, Place, Situation    Patient Cognition: Alert    Hospitalization in the last 30 days (Readmission):  No    If yes, Readmission Assessment in CM Navigator will be completed.    Advance Directives:      Code Status: Full Code   Patient's Primary Decision Maker is: Legal Next of Kin      Discharge Planning:    Patient lives with: (P) Children, Alone (Daughter) Type of Home: House  Primary Care Giver: Self  Patient Support Systems include: Family Members   Current Financial resources: Financial Counseling, Medicare (needs assistance applying for medicaid)  Current community resources: None  Current services prior to admission: Home Care, Meals On Wheels, Durable Medical Equipment            Current DME: Walker            Type of Home Care services:  OT, PT, Skilled Therapy    ADLS  Prior functional level: Independent in ADLs/IADLs  Current functional level: Bathing, Cooking, Housework, Shopping, Mobility, Dressing, Toileting (For one week has not been able to

## 2024-12-12 NOTE — DISCHARGE INSTRUCTIONS
PLEASE FOLLOW UP WITH YOUR DENTIST AS SOON AS POSSIBLE  FOR RIGHT JAW PAIN AND DECAYED TEETH.   and neck stiffness. Review of muscle cramps   Skin: Negative for pallor and rash. Large keloid on neck and chest and abdominal walls stable . Neurological: Negative for dizziness, tremors, seizures, syncope, facial asymmetry, speech difficulty, weakness and light-headedness. Djd of lumbar spine and medication in pain pump changed to morphine , that patient reports is working better. Hematological: Does not bruise/bleed easily. Psychiatric/Behavioral: Negative for behavioral problems, confusion, decreased concentration, dysphoric mood, hallucinations, self-injury, sleep disturbance and suicidal ideas. The patient is not nervous/anxious and is not hyperactive. Depression treated with Fetzima and Wellbutrin added last visit with report of no improvement. All other systems reviewed and are negative. Objective:   Physical Exam   Constitutional: She is oriented to person, place, and time. She appears well-developed and well-nourished. No distress. HENT:   Head: Normocephalic and atraumatic. Right Ear: External ear normal.   Left Ear: External ear normal.   Nose: Nose normal.   Mouth/Throat: Oropharynx is clear and moist. No oropharyngeal exudate. Eyes: Conjunctivae and EOM are normal. Pupils are equal, round, and reactive to light. Right eye exhibits no discharge. Left eye exhibits no discharge. No scleral icterus. Neck: Normal range of motion. Neck supple. No tracheal deviation present. No thyromegaly present. Cardiovascular: Normal rate, regular rhythm, normal heart sounds and intact distal pulses. Exam reveals no gallop. No murmur heard. Pulmonary/Chest: Effort normal and breath sounds normal. No respiratory distress. She has no wheezes. She has no rales. She exhibits no tenderness. Abdominal: Soft. Bowel sounds are normal. She exhibits no distension. There is no tenderness. There is no rebound and no guarding. Musculoskeletal: Normal range of motion.  She exhibits no edema or tenderness. Lymphadenopathy:     She has no cervical adenopathy. Neurological: She is alert and oriented to person, place, and time. No cranial nerve deficit. Coordination normal.   Skin: Skin is warm and dry. She is not diaphoretic. Blister on cheek on right. Psychiatric: She has a normal mood and affect. Her behavior is normal. Judgment and thought content normal.       Assessment:     1. Type 2 diabetes mellitus with complication, with long-term current use of insulin ([facility])   Lab Results   Component Value Date    LABA1C 6.2 09/05/2017     Lab Results   Component Value Date    .2 09/05/2017   lantus stopped due to hypoglycemia , for past few days gluc 140, 180, 190. Stop metformin and replace with Janumet. SITagliptin-metFORMIN (JANUMET XR)  MG TB24 per extended release tablet   2. Blister , fluid filled on right cheek. Looks like a second degree burn. Keep area clean and once ruptures , apply polysporin ointment /             Plan:      Jersey received counseling on the following healthy behaviors: medication adherence    Patient given educational materials on After visit summary with diagnosis and treatment plan. I have instructed Mee to complete a self tracking handout on Blood Sugars  and Blood Pressures  and instructed them to bring it with them to her next appointment. Discussed use, benefit, and side effects of prescribed medications. Barriers to medication compliance addressed. All patient questions answered. Pt voiced understanding.

## 2024-12-12 NOTE — CONSULTS
[address] [address]                              CONSULTATION      PATIENT NAME: JOSEPH PICHARDO                : 1955  MED REC NO: [id number]                      ROOM: 3012  ACCOUNT NO: [id number]                       ADMIT DATE: 12/10/2024  PROVIDER: Adán Duffy MD      CONSULT DATE: 2024    REASON FOR CONSULTATION:  Acute on chronic kidney disease.    HISTORY OF PRESENT ILLNESS:  The patient is a 69-year-old  female patient with past medical history significant for chronic kidney disease and a baseline creatinine ranging between 1.4 and 1.6 mg/dL.  The patient presented to [facility] complaining of dehydration.  She had decreased oral intake over the last several days and has developed generalized weakness with petechial rash.  Upon presentation, she was noted to have an acute kidney injury with a serum creatinine of 4.7 with associated anemia and a hemoglobin of 6.6 g/dL.  She was admitted for further evaluation, and Nephrology was consulted for further management of her worsening renal function.    PAST MEDICAL HISTORY:    1. Chronic kidney disease.  2. Laryngeal cancer.  3. Bronchitis.  4. Asthma.  5. Anemia.    PAST SURGICAL HISTORY:    1.  section.  2. Tracheostomy.    ALLERGIES:  THE PATIENT HAS NO KNOWN DRUG ALLERGIES.      SOCIAL HISTORY:  The patient quit smoking many years ago and does not drink alcohol.    FAMILY HISTORY:  Negative for kidney disease.    REVIEW OF SYSTEMS:  The patient denied any nausea, vomiting, or abdominal pain.  Otherwise, a 10-point review of systems was relatively unremarkable.    PHYSICAL EXAMINATION:  VITAL SIGNS:  Blood pressure 116/66, heart rate 63, respirations 18, temperature 97.8 Fahrenheit.  The patient is saturating 95% on 2 L nasal cannula.  GENERAL APPEARANCE:  The patient is alert and oriented x3, not in acute distress.  EYES:  Reveal

## 2024-12-12 NOTE — ACP (ADVANCE CARE PLANNING)
Advance Care Planning     General Advance Care Planning (ACP) Conversation    Date of Conversation: 12/12/2024  Conducted with: Patient with Decision Making Capacity  Other persons present: None    Son is legal next of kin.    Healthcare Decision Maker: No healthcare decision makers have been documented.     Today we documented Decision Maker(s) consistent with Legal Next of Kin hierarchy.  Content/Action Overview:  DECLINED ACP Conversation - will revisit periodically  Reviewed DNR/DNI and patient elects Full Code (Attempt Resuscitation)        Length of Voluntary ACP Conversation in minutes:  <16 minutes (Non-Billable)    Teresa Boeck, RN

## 2024-12-12 NOTE — CARE COORDINATION
Spoke with Lexie regarding the patient's need for placement and she is out of bed days with her Medicare. The patient is interested in Medicaid and thought someone was going to help her with this.    SHIRLEY spoke to Lexie/beverly to see what options are available to the patient. Lexie will meet with the patient to discuss Medicaid options.

## 2024-12-13 ENCOUNTER — APPOINTMENT (OUTPATIENT)
Dept: INTERVENTIONAL RADIOLOGY/VASCULAR | Age: 69
DRG: 640 | End: 2024-12-13
Payer: MEDICARE

## 2024-12-13 LAB
ANION GAP SERPL CALCULATED.3IONS-SCNC: 11 MMOL/L (ref 3–16)
ANION GAP SERPL CALCULATED.3IONS-SCNC: 13 MMOL/L (ref 3–16)
ANION GAP SERPL CALCULATED.3IONS-SCNC: 14 MMOL/L (ref 3–16)
ANION GAP SERPL CALCULATED.3IONS-SCNC: 14 MMOL/L (ref 3–16)
BUN SERPL-MCNC: 37 MG/DL (ref 7–20)
BUN SERPL-MCNC: 73 MG/DL (ref 7–20)
BUN SERPL-MCNC: 75 MG/DL (ref 7–20)
BUN SERPL-MCNC: 77 MG/DL (ref 7–20)
CALCIUM SERPL-MCNC: 7.1 MG/DL (ref 8.3–10.6)
CALCIUM SERPL-MCNC: 7.1 MG/DL (ref 8.3–10.6)
CALCIUM SERPL-MCNC: 7.2 MG/DL (ref 8.3–10.6)
CALCIUM SERPL-MCNC: 7.3 MG/DL (ref 8.3–10.6)
CHLORIDE SERPL-SCNC: 95 MMOL/L (ref 99–110)
CHLORIDE SERPL-SCNC: 95 MMOL/L (ref 99–110)
CHLORIDE SERPL-SCNC: 96 MMOL/L (ref 99–110)
CHLORIDE SERPL-SCNC: 97 MMOL/L (ref 99–110)
CO2 SERPL-SCNC: 19 MMOL/L (ref 21–32)
CO2 SERPL-SCNC: 20 MMOL/L (ref 21–32)
CO2 SERPL-SCNC: 20 MMOL/L (ref 21–32)
CO2 SERPL-SCNC: 25 MMOL/L (ref 21–32)
CREAT SERPL-MCNC: 2.8 MG/DL (ref 0.6–1.2)
CREAT SERPL-MCNC: 5.1 MG/DL (ref 0.6–1.2)
CREAT SERPL-MCNC: 5.2 MG/DL (ref 0.6–1.2)
CREAT SERPL-MCNC: 5.2 MG/DL (ref 0.6–1.2)
DEPRECATED RDW RBC AUTO: 16.7 % (ref 12.4–15.4)
GFR SERPLBLD CREATININE-BSD FMLA CKD-EPI: 18 ML/MIN/{1.73_M2}
GFR SERPLBLD CREATININE-BSD FMLA CKD-EPI: 8 ML/MIN/{1.73_M2}
GFR SERPLBLD CREATININE-BSD FMLA CKD-EPI: 8 ML/MIN/{1.73_M2}
GFR SERPLBLD CREATININE-BSD FMLA CKD-EPI: 9 ML/MIN/{1.73_M2}
GLUCOSE SERPL-MCNC: 119 MG/DL (ref 70–99)
GLUCOSE SERPL-MCNC: 120 MG/DL (ref 70–99)
GLUCOSE SERPL-MCNC: 149 MG/DL (ref 70–99)
GLUCOSE SERPL-MCNC: 94 MG/DL (ref 70–99)
HCT VFR BLD AUTO: 23 % (ref 36–48)
HGB BLD-MCNC: 7.7 G/DL (ref 12–16)
INR PPP: 1.06 (ref 0.85–1.15)
MCH RBC QN AUTO: 29.1 PG (ref 26–34)
MCHC RBC AUTO-ENTMCNC: 33.6 G/DL (ref 31–36)
MCV RBC AUTO: 86.5 FL (ref 80–100)
PLATELET # BLD AUTO: 157 K/UL (ref 135–450)
PMV BLD AUTO: 7.3 FL (ref 5–10.5)
POTASSIUM SERPL-SCNC: 3.6 MMOL/L (ref 3.5–5.1)
POTASSIUM SERPL-SCNC: 4.9 MMOL/L (ref 3.5–5.1)
POTASSIUM SERPL-SCNC: 4.9 MMOL/L (ref 3.5–5.1)
POTASSIUM SERPL-SCNC: 5.3 MMOL/L (ref 3.5–5.1)
PROTHROMBIN TIME: 14 SEC (ref 11.9–14.9)
RBC # BLD AUTO: 2.66 M/UL (ref 4–5.2)
SODIUM SERPL-SCNC: 129 MMOL/L (ref 136–145)
SODIUM SERPL-SCNC: 132 MMOL/L (ref 136–145)
WBC # BLD AUTO: 7.1 K/UL (ref 4–11)

## 2024-12-13 PROCEDURE — 87340 HEPATITIS B SURFACE AG IA: CPT

## 2024-12-13 PROCEDURE — 85610 PROTHROMBIN TIME: CPT

## 2024-12-13 PROCEDURE — 77001 FLUOROGUIDE FOR VEIN DEVICE: CPT

## 2024-12-13 PROCEDURE — 6370000000 HC RX 637 (ALT 250 FOR IP): Performed by: INTERNAL MEDICINE

## 2024-12-13 PROCEDURE — 94761 N-INVAS EAR/PLS OXIMETRY MLT: CPT

## 2024-12-13 PROCEDURE — 92526 ORAL FUNCTION THERAPY: CPT

## 2024-12-13 PROCEDURE — 86706 HEP B SURFACE ANTIBODY: CPT

## 2024-12-13 PROCEDURE — 6360000002 HC RX W HCPCS: Performed by: INTERNAL MEDICINE

## 2024-12-13 PROCEDURE — 2580000003 HC RX 258: Performed by: INTERNAL MEDICINE

## 2024-12-13 PROCEDURE — 80048 BASIC METABOLIC PNL TOTAL CA: CPT

## 2024-12-13 PROCEDURE — 36556 INSERT NON-TUNNEL CV CATH: CPT

## 2024-12-13 PROCEDURE — 05HM33Z INSERTION OF INFUSION DEVICE INTO RIGHT INTERNAL JUGULAR VEIN, PERCUTANEOUS APPROACH: ICD-10-PCS | Performed by: INTERNAL MEDICINE

## 2024-12-13 PROCEDURE — 99233 SBSQ HOSP IP/OBS HIGH 50: CPT | Performed by: INTERNAL MEDICINE

## 2024-12-13 PROCEDURE — 90935 HEMODIALYSIS ONE EVALUATION: CPT

## 2024-12-13 PROCEDURE — 85027 COMPLETE CBC AUTOMATED: CPT

## 2024-12-13 PROCEDURE — 76937 US GUIDE VASCULAR ACCESS: CPT

## 2024-12-13 PROCEDURE — 36415 COLL VENOUS BLD VENIPUNCTURE: CPT

## 2024-12-13 PROCEDURE — C1752 CATH,HEMODIALYSIS,SHORT-TERM: HCPCS

## 2024-12-13 PROCEDURE — P9047 ALBUMIN (HUMAN), 25%, 50ML: HCPCS | Performed by: INTERNAL MEDICINE

## 2024-12-13 PROCEDURE — 2060000000 HC ICU INTERMEDIATE R&B

## 2024-12-13 PROCEDURE — 5A1D70Z PERFORMANCE OF URINARY FILTRATION, INTERMITTENT, LESS THAN 6 HOURS PER DAY: ICD-10-PCS | Performed by: INTERNAL MEDICINE

## 2024-12-13 PROCEDURE — 2500000003 HC RX 250 WO HCPCS: Performed by: INTERNAL MEDICINE

## 2024-12-13 PROCEDURE — 94640 AIRWAY INHALATION TREATMENT: CPT

## 2024-12-13 PROCEDURE — 2700000000 HC OXYGEN THERAPY PER DAY

## 2024-12-13 RX ORDER — BUSPIRONE HYDROCHLORIDE 10 MG/1
10 TABLET ORAL 2 TIMES DAILY
Status: DISCONTINUED | OUTPATIENT
Start: 2024-12-13 | End: 2024-12-24 | Stop reason: HOSPADM

## 2024-12-13 RX ORDER — HEPARIN SODIUM 1000 [USP'U]/ML
2400 INJECTION, SOLUTION INTRAVENOUS; SUBCUTANEOUS PRN
Status: DISCONTINUED | OUTPATIENT
Start: 2024-12-13 | End: 2024-12-24 | Stop reason: DRUGHIGH

## 2024-12-13 RX ORDER — ALBUTEROL SULFATE 90 UG/1
2 INHALANT RESPIRATORY (INHALATION)
Status: DISCONTINUED | OUTPATIENT
Start: 2024-12-13 | End: 2024-12-16

## 2024-12-13 RX ADMIN — SODIUM CHLORIDE, PRESERVATIVE FREE 10 ML: 5 INJECTION INTRAVENOUS at 20:45

## 2024-12-13 RX ADMIN — ALBUMIN (HUMAN) 25 G: 0.25 INJECTION, SOLUTION INTRAVENOUS at 07:47

## 2024-12-13 RX ADMIN — Medication 2 PUFF: at 09:15

## 2024-12-13 RX ADMIN — BUSPIRONE HYDROCHLORIDE 10 MG: 10 TABLET ORAL at 20:38

## 2024-12-13 RX ADMIN — ALBUTEROL SULFATE 2 PUFF: 90 AEROSOL, METERED RESPIRATORY (INHALATION) at 19:19

## 2024-12-13 RX ADMIN — BUSPIRONE HYDROCHLORIDE 10 MG: 10 TABLET ORAL at 10:59

## 2024-12-13 RX ADMIN — Medication: at 18:35

## 2024-12-13 RX ADMIN — MIDODRINE HYDROCHLORIDE 10 MG: 10 TABLET ORAL at 10:59

## 2024-12-13 RX ADMIN — SODIUM CHLORIDE, PRESERVATIVE FREE 10 ML: 5 INJECTION INTRAVENOUS at 09:00

## 2024-12-13 RX ADMIN — ALBUTEROL SULFATE 2 PUFF: 90 AEROSOL, METERED RESPIRATORY (INHALATION) at 11:16

## 2024-12-13 RX ADMIN — FOLIC ACID 1 MG: 1 TABLET ORAL at 10:59

## 2024-12-13 RX ADMIN — HYDROCODONE BITARTRATE AND ACETAMINOPHEN 1 TABLET: 5; 325 TABLET ORAL at 10:13

## 2024-12-13 RX ADMIN — POLYETHYLENE GLYCOL (3350) 17 G: 17 POWDER, FOR SOLUTION ORAL at 20:44

## 2024-12-13 RX ADMIN — MIDODRINE HYDROCHLORIDE 10 MG: 10 TABLET ORAL at 13:27

## 2024-12-13 RX ADMIN — MUPIROCIN: 20 OINTMENT TOPICAL at 20:45

## 2024-12-13 RX ADMIN — HYDROCODONE BITARTRATE AND ACETAMINOPHEN 1 TABLET: 5; 325 TABLET ORAL at 21:56

## 2024-12-13 RX ADMIN — SODIUM BICARBONATE: 84 INJECTION, SOLUTION INTRAVENOUS at 10:23

## 2024-12-13 RX ADMIN — WATER 125 MG: 1 INJECTION INTRAMUSCULAR; INTRAVENOUS; SUBCUTANEOUS at 11:00

## 2024-12-13 RX ADMIN — POLYETHYLENE GLYCOL (3350) 17 G: 17 POWDER, FOR SOLUTION ORAL at 10:59

## 2024-12-13 ASSESSMENT — PAIN DESCRIPTION - LOCATION
LOCATION: BACK;LEG
LOCATION: GENERALIZED

## 2024-12-13 ASSESSMENT — PAIN SCALES - WONG BAKER: WONGBAKER_NUMERICALRESPONSE: HURTS WHOLE LOT

## 2024-12-13 ASSESSMENT — PAIN DESCRIPTION - ORIENTATION: ORIENTATION: LEFT;RIGHT;LOWER

## 2024-12-13 ASSESSMENT — PAIN SCALES - GENERAL
PAINLEVEL_OUTOF10: 9
PAINLEVEL_OUTOF10: 8

## 2024-12-13 ASSESSMENT — PAIN DESCRIPTION - DESCRIPTORS: DESCRIPTORS: ACHING

## 2024-12-13 NOTE — OR NURSING
Pt arrived for image guided temporary dialysis catheter insertion right IJ . Procedure explained including the risk and benefits of the procedure. All questions answered. Pt verbalizes understanding of the procedure and states no more questions. Consent confirmed. Vital signs stable. Labs, allergies, medications, and code status reviewed. No contraindications noted.     Vital Signs  Vitals:    12/13/24 1504   BP: 128/77   Pulse: 72   Resp: 18   Temp:    SpO2: 96%    (vital signs in table format)    Pre Hjon Score  2 - Able to move 4 extremities voluntarily on command  2 - BP+/- 20mmHg of normal  2 - Fully awake  1 - Needs oxygen to maintain oxygen saturation >90%  2 - Able to breathe deeply and cough freely    Allergies  Percocet [oxycodone-acetaminophen] (allergies)    Labs  Lab Results   Component Value Date    INR 1.06 12/13/2024    PROTIME 14.0 12/13/2024     Lab Results   Component Value Date    CREATININE 5.2 (HH) 12/13/2024    BUN 75 (H) 12/13/2024     (L) 12/13/2024    K 4.9 12/13/2024    CL 95 (L) 12/13/2024    CO2 20 (L) 12/13/2024     Lab Results   Component Value Date    WBC 7.1 12/13/2024    HGB 7.7 (L) 12/13/2024    HCT 23.0 (L) 12/13/2024    MCV 86.5 12/13/2024     12/13/2024   Time out completed prior to procedure.  Pt was place supine on the IR table.  Pt on cardiac monitoring. Pt remains on 3L via [place].

## 2024-12-13 NOTE — CARE COORDINATION
Spoke with patient at bedside to discuss discharge plan. The patient does not want to go to rehab even though she cannot get herself out of the bed.    The patient stated she can return to her nieces' house and she feels that her feet are getting better.     1:35 - Spoke to Suzanna/monique of patient who states she cannot bring the patient back to her home if she cannot walk. The patient has lived with her son and his wife for the last couple of years - although per Suzanna they do not have a great relationship.     The patient recently discharged from  [facility] in [place] on Sunday 12/8/24. The patient returned to her son's home. The patient was at her son's home for one over night. The patient went to her niece Suzanna's on 12/9/24. Suzanna took the patient to the PCP on 12/10/24 and was referred to the ED by the PCP.    CM will check with a facility close to [place] to see if the patient has any skilled days left.    1:51 - Spoke with [facility]. They do not have beds available.     2:36 - [facility] for [facility] requesting a call back to discuss a referral. They do not accept insurance.     Spoke with Vidhya/Simeon who advised the patient does have bed days left.     Spoke with Suzanna to discuss plan. Suzanna will speak to the patient to discuss therapy.     CM made referral to [facility] [facility]. They do not have a bed but will review patient for [place]. Once a bed is available Simeon/Adam can transfer patient to [facility]. Vidhya will start precert if she can accept.     Patient refusing SNF. Family cannot care for patient unless she goes to therapy and can walk. Family in agreement to start precert for skilled at [facility] and the family will talk to the patient.

## 2024-12-13 NOTE — OR NURSING
Image guided temporary dialysis catheter completed. Dr. Bailey placed a 13 [place] 15 cm [employer] Trialysis short term straight dialysis catheter LOT [id number] EXP 02/28/2026 in the right IJ. Line aspirates and flushes with ease. Dialysis catheter secured with sutures. Surgical site dressing clean, dry, and intact. Each dialysis access lumen heparin locked with 1.2 ml of IV heparin each. IV access pigtail NS locked. Pt tolerated procedure without any signs or symptoms of distress. Vital signs stable. Report called to Luis WOODS on ICU. Pt transported back to Dialysis in stable condition via bed by transport. Line ok to use per Lynn.    Vital Signs  Vitals:    12/13/24 1504   BP: 128/77   Pulse: 72   Resp: 18   Temp:    SpO2: 96%

## 2024-12-13 NOTE — BRIEF OP NOTE
Interventional Radiology  Brief Postoperative Note    Patient: Emily Alegre  YOB: 1955  MRN: [id number]      Pre-operative Diagnosis: FELISA    Post-operative Diagnosis: Same    Procedure: Temporary HD catheter placement    Anesthesia: Local    Surgeons/Assistants: Alfredito Bailey DO    Estimated Blood Loss: less than 50     Complications: None    Infection Present At Time Of Surgery (PATOS) (choose all levels that have infection present):  No infection present    Specimens: Was Not Obtained    Findings:   Patent right IJ vein.  Successful placement of a temporary HD catheter, ready for immediate use.       Alfredito Bailey DO  12/13/2024, 3:15 PM  Interventional Radiology  [phone] ([id number])

## 2024-12-14 ENCOUNTER — APPOINTMENT (OUTPATIENT)
Dept: GENERAL RADIOLOGY | Age: 69
DRG: 640 | End: 2024-12-14
Payer: MEDICARE

## 2024-12-14 PROBLEM — J96.01 ACUTE RESPIRATORY FAILURE WITH HYPOXIA: Status: ACTIVE | Noted: 2024-12-14

## 2024-12-14 LAB
ANION GAP SERPL CALCULATED.3IONS-SCNC: 13 MMOL/L (ref 3–16)
ANION GAP SERPL CALCULATED.3IONS-SCNC: 13 MMOL/L (ref 3–16)
BUN SERPL-MCNC: 51 MG/DL (ref 7–20)
BUN SERPL-MCNC: 53 MG/DL (ref 7–20)
CALCIUM SERPL-MCNC: 7.2 MG/DL (ref 8.3–10.6)
CALCIUM SERPL-MCNC: 7.2 MG/DL (ref 8.3–10.6)
CHLORIDE SERPL-SCNC: 96 MMOL/L (ref 99–110)
CHLORIDE SERPL-SCNC: 96 MMOL/L (ref 99–110)
CO2 SERPL-SCNC: 22 MMOL/L (ref 21–32)
CO2 SERPL-SCNC: 23 MMOL/L (ref 21–32)
CREAT SERPL-MCNC: 3.8 MG/DL (ref 0.6–1.2)
CREAT SERPL-MCNC: 4 MG/DL (ref 0.6–1.2)
DEPRECATED RDW RBC AUTO: 16.7 % (ref 12.4–15.4)
GFR SERPLBLD CREATININE-BSD FMLA CKD-EPI: 12 ML/MIN/{1.73_M2}
GFR SERPLBLD CREATININE-BSD FMLA CKD-EPI: 12 ML/MIN/{1.73_M2}
GLUCOSE SERPL-MCNC: 106 MG/DL (ref 70–99)
GLUCOSE SERPL-MCNC: 118 MG/DL (ref 70–99)
HBV SURFACE AB SERPL IA-ACNC: <3.5 MIU/ML
HBV SURFACE AG SERPL QL IA: NORMAL
HCT VFR BLD AUTO: 21.7 % (ref 36–48)
HGB BLD-MCNC: 7.4 G/DL (ref 12–16)
MAGNESIUM SERPL-MCNC: 1.77 MG/DL (ref 1.8–2.4)
MCH RBC QN AUTO: 29.2 PG (ref 26–34)
MCHC RBC AUTO-ENTMCNC: 34 G/DL (ref 31–36)
MCV RBC AUTO: 86 FL (ref 80–100)
NT-PROBNP SERPL-MCNC: ABNORMAL PG/ML (ref 0–124)
PLATELET # BLD AUTO: 144 K/UL (ref 135–450)
PMV BLD AUTO: 7.2 FL (ref 5–10.5)
POTASSIUM SERPL-SCNC: 4.3 MMOL/L (ref 3.5–5.1)
POTASSIUM SERPL-SCNC: 4.8 MMOL/L (ref 3.5–5.1)
PROCALCITONIN SERPL IA-MCNC: 3.63 NG/ML (ref 0–0.15)
RBC # BLD AUTO: 2.53 M/UL (ref 4–5.2)
SODIUM SERPL-SCNC: 131 MMOL/L (ref 136–145)
SODIUM SERPL-SCNC: 132 MMOL/L (ref 136–145)
WBC # BLD AUTO: 8.3 K/UL (ref 4–11)

## 2024-12-14 PROCEDURE — 80048 BASIC METABOLIC PNL TOTAL CA: CPT

## 2024-12-14 PROCEDURE — 2700000000 HC OXYGEN THERAPY PER DAY

## 2024-12-14 PROCEDURE — 84145 PROCALCITONIN (PCT): CPT

## 2024-12-14 PROCEDURE — 71045 X-RAY EXAM CHEST 1 VIEW: CPT

## 2024-12-14 PROCEDURE — 94761 N-INVAS EAR/PLS OXIMETRY MLT: CPT

## 2024-12-14 PROCEDURE — 87070 CULTURE OTHR SPECIMN AEROBIC: CPT

## 2024-12-14 PROCEDURE — 6360000002 HC RX W HCPCS: Performed by: INTERNAL MEDICINE

## 2024-12-14 PROCEDURE — 2060000000 HC ICU INTERMEDIATE R&B

## 2024-12-14 PROCEDURE — 86403 PARTICLE AGGLUT ANTBDY SCRN: CPT

## 2024-12-14 PROCEDURE — 2580000003 HC RX 258: Performed by: INTERNAL MEDICINE

## 2024-12-14 PROCEDURE — 6360000002 HC RX W HCPCS: Performed by: STUDENT IN AN ORGANIZED HEALTH CARE EDUCATION/TRAINING PROGRAM

## 2024-12-14 PROCEDURE — 6370000000 HC RX 637 (ALT 250 FOR IP): Performed by: INTERNAL MEDICINE

## 2024-12-14 PROCEDURE — 83735 ASSAY OF MAGNESIUM: CPT

## 2024-12-14 PROCEDURE — 87077 CULTURE AEROBIC IDENTIFY: CPT

## 2024-12-14 PROCEDURE — 36415 COLL VENOUS BLD VENIPUNCTURE: CPT

## 2024-12-14 PROCEDURE — 87186 SC STD MICRODIL/AGAR DIL: CPT

## 2024-12-14 PROCEDURE — 99233 SBSQ HOSP IP/OBS HIGH 50: CPT | Performed by: INTERNAL MEDICINE

## 2024-12-14 PROCEDURE — 90935 HEMODIALYSIS ONE EVALUATION: CPT

## 2024-12-14 PROCEDURE — 94640 AIRWAY INHALATION TREATMENT: CPT

## 2024-12-14 PROCEDURE — 85027 COMPLETE CBC AUTOMATED: CPT

## 2024-12-14 PROCEDURE — 83880 ASSAY OF NATRIURETIC PEPTIDE: CPT

## 2024-12-14 PROCEDURE — 87205 SMEAR GRAM STAIN: CPT

## 2024-12-14 RX ORDER — PANTOPRAZOLE SODIUM 40 MG/1
40 TABLET, DELAYED RELEASE ORAL
Status: DISCONTINUED | OUTPATIENT
Start: 2024-12-14 | End: 2024-12-15

## 2024-12-14 RX ORDER — BENZONATATE 100 MG/1
100 CAPSULE ORAL 3 TIMES DAILY PRN
Status: DISCONTINUED | OUTPATIENT
Start: 2024-12-14 | End: 2024-12-24 | Stop reason: HOSPADM

## 2024-12-14 RX ORDER — HYDROCODONE BITARTRATE AND HOMATROPINE METHYLBROMIDE ORAL SOLUTION 5; 1.5 MG/5ML; MG/5ML
5 LIQUID ORAL EVERY 4 HOURS PRN
Status: DISCONTINUED | OUTPATIENT
Start: 2024-12-14 | End: 2024-12-24 | Stop reason: HOSPADM

## 2024-12-14 RX ADMIN — MIDODRINE HYDROCHLORIDE 10 MG: 10 TABLET ORAL at 10:50

## 2024-12-14 RX ADMIN — HYDROMORPHONE HYDROCHLORIDE 0.5 MG: 1 INJECTION, SOLUTION INTRAMUSCULAR; INTRAVENOUS; SUBCUTANEOUS at 00:31

## 2024-12-14 RX ADMIN — SODIUM CHLORIDE, PRESERVATIVE FREE 10 ML: 5 INJECTION INTRAVENOUS at 08:32

## 2024-12-14 RX ADMIN — ALBUTEROL SULFATE 2 PUFF: 90 AEROSOL, METERED RESPIRATORY (INHALATION) at 14:45

## 2024-12-14 RX ADMIN — MIDODRINE HYDROCHLORIDE 10 MG: 10 TABLET ORAL at 16:35

## 2024-12-14 RX ADMIN — PANTOPRAZOLE SODIUM 40 MG: 40 TABLET, DELAYED RELEASE ORAL at 10:50

## 2024-12-14 RX ADMIN — MUPIROCIN: 20 OINTMENT TOPICAL at 21:31

## 2024-12-14 RX ADMIN — MIDODRINE HYDROCHLORIDE 10 MG: 10 TABLET ORAL at 08:29

## 2024-12-14 RX ADMIN — BUSPIRONE HYDROCHLORIDE 10 MG: 10 TABLET ORAL at 21:31

## 2024-12-14 RX ADMIN — ALBUTEROL SULFATE 2 PUFF: 90 AEROSOL, METERED RESPIRATORY (INHALATION) at 10:54

## 2024-12-14 RX ADMIN — SODIUM CHLORIDE, PRESERVATIVE FREE 10 ML: 5 INJECTION INTRAVENOUS at 21:30

## 2024-12-14 RX ADMIN — ONDANSETRON 4 MG: 2 INJECTION INTRAMUSCULAR; INTRAVENOUS at 19:20

## 2024-12-14 RX ADMIN — ALBUTEROL SULFATE 2 PUFF: 90 AEROSOL, METERED RESPIRATORY (INHALATION) at 07:11

## 2024-12-14 RX ADMIN — HYDROCODONE BITARTRATE AND ACETAMINOPHEN 1 TABLET: 5; 325 TABLET ORAL at 08:29

## 2024-12-14 RX ADMIN — FOLIC ACID 1 MG: 1 TABLET ORAL at 08:30

## 2024-12-14 RX ADMIN — POLYETHYLENE GLYCOL (3350) 17 G: 17 POWDER, FOR SOLUTION ORAL at 08:30

## 2024-12-14 RX ADMIN — MUPIROCIN: 20 OINTMENT TOPICAL at 08:30

## 2024-12-14 RX ADMIN — ONDANSETRON 4 MG: 2 INJECTION INTRAMUSCULAR; INTRAVENOUS at 05:41

## 2024-12-14 RX ADMIN — BUSPIRONE HYDROCHLORIDE 10 MG: 10 TABLET ORAL at 08:30

## 2024-12-14 RX ADMIN — HYDROCODONE BITARTRATE AND HOMATROPINE METHYLBROMIDE 5 ML: 5; 1.5 SOLUTION ORAL at 10:50

## 2024-12-14 RX ADMIN — WATER 125 MG: 1 INJECTION INTRAMUSCULAR; INTRAVENOUS; SUBCUTANEOUS at 08:30

## 2024-12-14 RX ADMIN — HYDROCODONE BITARTRATE AND ACETAMINOPHEN 1 TABLET: 5; 325 TABLET ORAL at 16:50

## 2024-12-14 ASSESSMENT — PAIN DESCRIPTION - PAIN TYPE
TYPE: ACUTE PAIN;CHRONIC PAIN
TYPE: ACUTE PAIN;CHRONIC PAIN

## 2024-12-14 ASSESSMENT — PAIN DESCRIPTION - LOCATION
LOCATION: BACK
LOCATION: OTHER (COMMENT)
LOCATION: OTHER (COMMENT)

## 2024-12-14 ASSESSMENT — PAIN DESCRIPTION - FREQUENCY
FREQUENCY: CONTINUOUS
FREQUENCY: CONTINUOUS

## 2024-12-14 ASSESSMENT — PAIN DESCRIPTION - DESCRIPTORS
DESCRIPTORS: ACHING
DESCRIPTORS: ACHING

## 2024-12-14 ASSESSMENT — PAIN SCALES - GENERAL
PAINLEVEL_OUTOF10: 0
PAINLEVEL_OUTOF10: 9
PAINLEVEL_OUTOF10: 8
PAINLEVEL_OUTOF10: 10

## 2024-12-14 ASSESSMENT — PAIN - FUNCTIONAL ASSESSMENT
PAIN_FUNCTIONAL_ASSESSMENT: PREVENTS OR INTERFERES SOME ACTIVE ACTIVITIES AND ADLS
PAIN_FUNCTIONAL_ASSESSMENT: PREVENTS OR INTERFERES SOME ACTIVE ACTIVITIES AND ADLS

## 2024-12-14 ASSESSMENT — PAIN DESCRIPTION - ONSET
ONSET: ON-GOING
ONSET: ON-GOING

## 2024-12-14 ASSESSMENT — PAIN DESCRIPTION - ORIENTATION: ORIENTATION: LEFT;RIGHT

## 2024-12-14 NOTE — FLOWSHEET NOTE
12/13/24 1902   Vital Signs   Temp 98.4 °F (36.9 °C)   Temp Source Oral   Pulse 78   Heart Rate Source Monitor   Respirations 18   /70   MAP (Calculated) 88   BP Location Left upper arm   BP Method Automatic   Patient Position Semi fowlers   Oxygen Therapy   SpO2 94 %   O2 Device Nasal cannula   O2 Flow Rate (L/min) 3 L/min     Pt in bed repositioned requesting Dilaudid for pain relief in stead of Norco. A/O other medications given . Asked pt to try other pain medication, pt remain to request Dilaudid. Call light within reach   Unna Boot Text: An Unna boot was placed to help immobilize the limb and facilitate more rapid healing.

## 2024-12-14 NOTE — FLOWSHEET NOTE
12/14/24 0045   Vital Signs   Temp 97.6 °F (36.4 °C)   Temp Source Oral   Pulse 74   Heart Rate Source Monitor   Respirations 16   /70   MAP (Calculated) 86   BP Location Left upper arm   BP Method Automatic   Patient Position Semi fowlers     Pt did request Dilaudid for break-thru pain MD ordered Dilaudid 0.5 mg IV x1 dose. Educated pt to try Norco today and if that is working stick with that medication. Pt agreed. Pt concerned about dehydration informed her that the HD improved her lab numbers and that she should try to drink more fluids d/t she hardly drinks a cup of water since she has been on the unit. Pt did drink about 240 of water then could not anymore.

## 2024-12-14 NOTE — FLOWSHEET NOTE
12/14/24 0748   Oxygen Therapy   SpO2 97 %   O2 Device High flow nasal cannula   O2 Flow Rate (L/min) 8 L/min     Patient coughing up thick creamy red/pink and yellow sputum.

## 2024-12-14 NOTE — PLAN OF CARE
Problem: Cardiovascular - Adult  Goal: Maintains optimal cardiac output and hemodynamic stability  Outcome: Progressing  Flowsheets (Taken 12/14/2024 1210)  Maintains optimal cardiac output and hemodynamic stability:   Monitor blood pressure and heart rate   Monitor urine output and notify Licensed Independent Practitioner for values outside of normal range   Assess for signs of decreased cardiac output     Problem: Genitourinary - Adult  Goal: Urinary catheter remains patent  Outcome: Progressing  Flowsheets (Taken 12/14/2024 1210)  Urinary catheter remains patent: Assess patency of urinary catheter     Problem: Hematologic - Adult  Goal: Maintains hematologic stability  Outcome: Progressing  Flowsheets (Taken 12/14/2024 1210)  Maintains hematologic stability:   Assess for signs and symptoms of bleeding or hemorrhage   Monitor labs for bleeding or clotting disorders   Administer blood products/factors as ordered     Problem: Nutrition Deficit:  Goal: Optimize nutritional status  12/13/2024 2238 by Tere Kerns, RN  Outcome: Progressing  Flowsheets (Taken 12/13/2024 1215 by Cleo Alejo, RD, LD)  Nutrient intake appropriate for improving, restoring, or maintaining nutritional needs:   Assess nutritional status and recommend course of action   Monitor oral intake, labs, and treatment plans   Recommend appropriate diets, oral nutritional supplements, and vitamin/mineral supplements

## 2024-12-14 NOTE — PLAN OF CARE
Problem: Discharge Planning  Goal: Discharge to home or other facility with appropriate resources  Outcome: Progressing     Problem: Pain  Goal: Verbalizes/displays adequate comfort level or baseline comfort level  Outcome: Progressing     Problem: Safety - Adult  Goal: Free from fall injury  Outcome: Progressing     Problem: ABCDS Injury Assessment  Goal: Absence of physical injury  Outcome: Progressing     Problem: Nutrition Deficit:  Goal: Optimize nutritional status  Outcome: Progressing  Flowsheets (Taken 12/13/2024 1215 by Cleo Alejo, RD, LD)  Nutrient intake appropriate for improving, restoring, or maintaining nutritional needs:   Assess nutritional status and recommend course of action   Monitor oral intake, labs, and treatment plans   Recommend appropriate diets, oral nutritional supplements, and vitamin/mineral supplements     Problem: Skin/Tissue Integrity  Goal: Absence of new skin breakdown  Description: 1.  Monitor for areas of redness and/or skin breakdown  2.  Assess vascular access sites hourly  3.  Every 4-6 hours minimum:  Change oxygen saturation probe site  4.  Every 4-6 hours:  If on nasal continuous positive airway pressure, respiratory therapy assess nares and determine need for appliance change or resting period.  Outcome: Progressing

## 2024-12-15 ENCOUNTER — APPOINTMENT (OUTPATIENT)
Dept: CT IMAGING | Age: 69
DRG: 640 | End: 2024-12-15
Payer: MEDICARE

## 2024-12-15 LAB
ANION GAP SERPL CALCULATED.3IONS-SCNC: 12 MMOL/L (ref 3–16)
BUN SERPL-MCNC: 38 MG/DL (ref 7–20)
CALCIUM SERPL-MCNC: 7.3 MG/DL (ref 8.3–10.6)
CHLORIDE SERPL-SCNC: 98 MMOL/L (ref 99–110)
CO2 SERPL-SCNC: 24 MMOL/L (ref 21–32)
CREAT SERPL-MCNC: 3.2 MG/DL (ref 0.6–1.2)
DEPRECATED RDW RBC AUTO: 16.7 % (ref 12.4–15.4)
GFR SERPLBLD CREATININE-BSD FMLA CKD-EPI: 15 ML/MIN/{1.73_M2}
GLUCOSE SERPL-MCNC: 105 MG/DL (ref 70–99)
HCT VFR BLD AUTO: 21.1 % (ref 36–48)
HGB BLD-MCNC: 7 G/DL (ref 12–16)
MCH RBC QN AUTO: 28.7 PG (ref 26–34)
MCHC RBC AUTO-ENTMCNC: 33.4 G/DL (ref 31–36)
MCV RBC AUTO: 86.1 FL (ref 80–100)
MYELOPEROXIDASE AB SER-ACNC: 0 AU/ML (ref 0–19)
PLATELET # BLD AUTO: 100 K/UL (ref 135–450)
PMV BLD AUTO: 7.5 FL (ref 5–10.5)
POTASSIUM SERPL-SCNC: 4 MMOL/L (ref 3.5–5.1)
PROTEINASE3 AB SER-ACNC: 229 AU/ML (ref 0–19)
RBC # BLD AUTO: 2.45 M/UL (ref 4–5.2)
SODIUM SERPL-SCNC: 134 MMOL/L (ref 136–145)
WBC # BLD AUTO: 4.5 K/UL (ref 4–11)

## 2024-12-15 PROCEDURE — 2580000003 HC RX 258: Performed by: INTERNAL MEDICINE

## 2024-12-15 PROCEDURE — 6370000000 HC RX 637 (ALT 250 FOR IP): Performed by: INTERNAL MEDICINE

## 2024-12-15 PROCEDURE — 94640 AIRWAY INHALATION TREATMENT: CPT

## 2024-12-15 PROCEDURE — 6360000002 HC RX W HCPCS: Performed by: INTERNAL MEDICINE

## 2024-12-15 PROCEDURE — 94761 N-INVAS EAR/PLS OXIMETRY MLT: CPT

## 2024-12-15 PROCEDURE — 2700000000 HC OXYGEN THERAPY PER DAY

## 2024-12-15 PROCEDURE — 97530 THERAPEUTIC ACTIVITIES: CPT

## 2024-12-15 PROCEDURE — 36415 COLL VENOUS BLD VENIPUNCTURE: CPT

## 2024-12-15 PROCEDURE — 99233 SBSQ HOSP IP/OBS HIGH 50: CPT | Performed by: INTERNAL MEDICINE

## 2024-12-15 PROCEDURE — 85027 COMPLETE CBC AUTOMATED: CPT

## 2024-12-15 PROCEDURE — 71250 CT THORAX DX C-: CPT

## 2024-12-15 PROCEDURE — 80048 BASIC METABOLIC PNL TOTAL CA: CPT

## 2024-12-15 PROCEDURE — 2060000000 HC ICU INTERMEDIATE R&B

## 2024-12-15 RX ADMIN — ACETAMINOPHEN 650 MG: 325 TABLET ORAL at 09:07

## 2024-12-15 RX ADMIN — HYDROCODONE BITARTRATE AND ACETAMINOPHEN 1 TABLET: 5; 325 TABLET ORAL at 03:04

## 2024-12-15 RX ADMIN — PIPERACILLIN AND TAZOBACTAM 3375 MG: 3; .375 INJECTION, POWDER, LYOPHILIZED, FOR SOLUTION INTRAVENOUS at 23:56

## 2024-12-15 RX ADMIN — MIDODRINE HYDROCHLORIDE 10 MG: 10 TABLET ORAL at 09:07

## 2024-12-15 RX ADMIN — SODIUM CHLORIDE, PRESERVATIVE FREE 10 ML: 5 INJECTION INTRAVENOUS at 09:10

## 2024-12-15 RX ADMIN — ALBUTEROL SULFATE 2 PUFF: 90 AEROSOL, METERED RESPIRATORY (INHALATION) at 15:00

## 2024-12-15 RX ADMIN — Medication 2 PUFF: at 03:24

## 2024-12-15 RX ADMIN — POLYETHYLENE GLYCOL (3350) 17 G: 17 POWDER, FOR SOLUTION ORAL at 09:04

## 2024-12-15 RX ADMIN — HYDROCODONE BITARTRATE AND ACETAMINOPHEN 1 TABLET: 5; 325 TABLET ORAL at 16:31

## 2024-12-15 RX ADMIN — HYDROCODONE BITARTRATE AND HOMATROPINE METHYLBROMIDE 5 ML: 5; 1.5 SOLUTION ORAL at 20:43

## 2024-12-15 RX ADMIN — ACETAMINOPHEN 650 MG: 325 TABLET ORAL at 20:43

## 2024-12-15 RX ADMIN — ALBUTEROL SULFATE 2 PUFF: 90 AEROSOL, METERED RESPIRATORY (INHALATION) at 07:12

## 2024-12-15 RX ADMIN — PIPERACILLIN AND TAZOBACTAM 4500 MG: 4; .5 INJECTION, POWDER, LYOPHILIZED, FOR SOLUTION INTRAVENOUS; PARENTERAL at 16:43

## 2024-12-15 RX ADMIN — MIDODRINE HYDROCHLORIDE 10 MG: 10 TABLET ORAL at 11:43

## 2024-12-15 RX ADMIN — HYDROCODONE BITARTRATE AND ACETAMINOPHEN 1 TABLET: 5; 325 TABLET ORAL at 09:08

## 2024-12-15 RX ADMIN — POLYETHYLENE GLYCOL (3350) 17 G: 17 POWDER, FOR SOLUTION ORAL at 20:44

## 2024-12-15 RX ADMIN — WATER 125 MG: 1 INJECTION INTRAMUSCULAR; INTRAVENOUS; SUBCUTANEOUS at 09:09

## 2024-12-15 RX ADMIN — ALBUTEROL SULFATE 2 PUFF: 90 AEROSOL, METERED RESPIRATORY (INHALATION) at 19:16

## 2024-12-15 RX ADMIN — HYDROCODONE BITARTRATE AND HOMATROPINE METHYLBROMIDE 5 ML: 5; 1.5 SOLUTION ORAL at 03:08

## 2024-12-15 RX ADMIN — MUPIROCIN: 20 OINTMENT TOPICAL at 09:09

## 2024-12-15 RX ADMIN — FOLIC ACID 1 MG: 1 TABLET ORAL at 09:08

## 2024-12-15 RX ADMIN — ALBUTEROL SULFATE 2 PUFF: 90 AEROSOL, METERED RESPIRATORY (INHALATION) at 10:58

## 2024-12-15 RX ADMIN — BUSPIRONE HYDROCHLORIDE 10 MG: 10 TABLET ORAL at 20:43

## 2024-12-15 RX ADMIN — PANTOPRAZOLE SODIUM 40 MG: 40 INJECTION, POWDER, FOR SOLUTION INTRAVENOUS at 11:43

## 2024-12-15 RX ADMIN — BUSPIRONE HYDROCHLORIDE 10 MG: 10 TABLET ORAL at 09:08

## 2024-12-15 ASSESSMENT — PAIN DESCRIPTION - LOCATION
LOCATION: SHOULDER
LOCATION: SHOULDER
LOCATION: GENERALIZED

## 2024-12-15 ASSESSMENT — PAIN DESCRIPTION - DESCRIPTORS
DESCRIPTORS: ACHING

## 2024-12-15 ASSESSMENT — PAIN SCALES - GENERAL
PAINLEVEL_OUTOF10: 9
PAINLEVEL_OUTOF10: 10
PAINLEVEL_OUTOF10: 9

## 2024-12-15 ASSESSMENT — PAIN - FUNCTIONAL ASSESSMENT
PAIN_FUNCTIONAL_ASSESSMENT: PREVENTS OR INTERFERES SOME ACTIVE ACTIVITIES AND ADLS

## 2024-12-15 ASSESSMENT — PAIN DESCRIPTION - ORIENTATION
ORIENTATION: LEFT;RIGHT
ORIENTATION: LEFT;RIGHT

## 2024-12-15 ASSESSMENT — PAIN DESCRIPTION - ONSET
ONSET: ON-GOING

## 2024-12-15 ASSESSMENT — PAIN DESCRIPTION - FREQUENCY
FREQUENCY: CONTINUOUS

## 2024-12-15 ASSESSMENT — PAIN DESCRIPTION - PAIN TYPE
TYPE: ACUTE PAIN;CHRONIC PAIN

## 2024-12-15 NOTE — CONSULTS
PHARMACY TO DOSE ZOSYN     Dx:  Respiratory infection  Continue therapy until discontinued by MD.    Hemodialysis patient;  BMI = 24.47 kg/m2    Zosyn 4.5 g IVPB over 30 minutes x 1, then 3.375 g IVPB extended-infusion every 12 hours until discontinued.    SUKUMAR De La RosaPh.12/15/06504:27 PM

## 2024-12-15 NOTE — PLAN OF CARE
Problem: Pain  Goal: Verbalizes/displays adequate comfort level or baseline comfort level  Outcome: Progressing  Flowsheets (Taken 12/15/2024 1026)  Verbalizes/displays adequate comfort level or baseline comfort level:   Encourage patient to monitor pain and request assistance   Administer analgesics based on type and severity of pain and evaluate response   Assess pain using appropriate pain scale     Problem: Cardiovascular - Adult  Goal: Maintains optimal cardiac output and hemodynamic stability  12/15/2024 1026 by Tere Harmon RN  Outcome: Progressing  Flowsheets (Taken 12/15/2024 1026)  Maintains optimal cardiac output and hemodynamic stability:   Monitor blood pressure and heart rate   Assess for signs of decreased cardiac output   Monitor urine output and notify Licensed Independent Practitioner for values outside of normal range     Problem: Genitourinary - Adult  Goal: Urinary catheter remains patent  12/15/2024 1026 by Tere Harmon RN  Outcome: Progressing  Flowsheets (Taken 12/15/2024 1026)  Urinary catheter remains patent: Assess patency of urinary catheter     Problem: Hematologic - Adult  Goal: Maintains hematologic stability  12/15/2024 1026 by Tere Harmon RN  Outcome: Progressing  Flowsheets (Taken 12/15/2024 1026)  Maintains hematologic stability: Assess for signs and symptoms of bleeding or hemorrhage     Problem: Musculoskeletal - Adult  Goal: Return mobility to safest level of function  Outcome: Progressing  Flowsheets (Taken 12/15/2024 1026)  Return Mobility to Safest Level of Function:   Assess patient stability and activity tolerance for standing, transferring and ambulating with or without assistive devices   Assist with transfers and ambulation using safe patient handling equipment as needed

## 2024-12-15 NOTE — DIALYSIS
Treatment time: 2 hours  Net UF: 2000 ml     Pre weight: 68.7 kg  Post weight:66.7 kg  EDW: TBD kg    Access used: R TNDC    Access function: well with  ml/min     Medications or blood products given: Heparin Dwells     Regular outpatient schedule: First treatment 12/13     Summary of response to treatment: Patient tolerated treatment well and without any complications. Patient remained stable throughout entire treatment and upon the exiting the dialysis suite via transport.     Report given to Eulalia James RN and copy of dialysis treatment record placed in chart, to be scanned into EMR.

## 2024-12-15 NOTE — PLAN OF CARE
Problem: Discharge Planning  Goal: Discharge to home or other facility with appropriate resources  Outcome: Progressing     Problem: Safety - Adult  Goal: Free from fall injury  Outcome: Progressing     Problem: ABCDS Injury Assessment  Goal: Absence of physical injury  Outcome: Progressing     Problem: Cardiovascular - Adult  Goal: Maintains optimal cardiac output and hemodynamic stability  12/14/2024 2216 by Eulalia James RN  Outcome: Progressing     Problem: Genitourinary - Adult  Goal: Urinary catheter remains patent  12/14/2024 2216 by Eulalia James RN  Outcome: Progressing     Problem: Hematologic - Adult  Goal: Maintains hematologic stability  12/14/2024 2216 by Eulalia James RN  Outcome: Progressing

## 2024-12-15 NOTE — FLOWSHEET NOTE
12/14/24 2330   Vital Signs   Temp 98.2 °F (36.8 °C)   Temp Source Oral   Pulse 76   Heart Rate Source Monitor   Respirations 18   /72   MAP (Calculated) 92   Oxygen Therapy   SpO2 95 %   O2 Device High flow nasal cannula   O2 Flow Rate (L/min) 6 L/min     Resting quietly with respirations WNL on 6L HFNC.  Call in easy reach.  Bed alarm on for safety.  Continue to monitor closely.  Eulalia James RN

## 2024-12-16 ENCOUNTER — APPOINTMENT (OUTPATIENT)
Age: 69
DRG: 640 | End: 2024-12-16
Attending: INTERNAL MEDICINE
Payer: MEDICARE

## 2024-12-16 ENCOUNTER — ANESTHESIA EVENT (OUTPATIENT)
Dept: ENDOSCOPY | Age: 69
DRG: 640 | End: 2024-12-16
Payer: MEDICARE

## 2024-12-16 PROBLEM — J96.01 ACUTE HYPOXEMIC RESPIRATORY FAILURE: Status: ACTIVE | Noted: 2024-12-16

## 2024-12-16 PROBLEM — R91.8 LUNG NODULES: Status: ACTIVE | Noted: 2024-12-16

## 2024-12-16 PROBLEM — J43.9 PULMONARY EMPHYSEMA (HCC): Status: ACTIVE | Noted: 2024-12-16

## 2024-12-16 PROBLEM — R79.89 ELEVATED PROCALCITONIN: Status: ACTIVE | Noted: 2024-12-16

## 2024-12-16 LAB
ANION GAP SERPL CALCULATED.3IONS-SCNC: 13 MMOL/L (ref 3–16)
BASOPHILS # BLD: 0 K/UL (ref 0–0.2)
BASOPHILS NFR BLD: 0 %
BUN SERPL-MCNC: 55 MG/DL (ref 7–20)
CALCIUM SERPL-MCNC: 7.7 MG/DL (ref 8.3–10.6)
CHLORIDE SERPL-SCNC: 96 MMOL/L (ref 99–110)
CO2 SERPL-SCNC: 23 MMOL/L (ref 21–32)
CREAT SERPL-MCNC: 3.9 MG/DL (ref 0.6–1.2)
DEPRECATED RDW RBC AUTO: 16.9 % (ref 12.4–15.4)
ECHO AO ASC DIAM: 3.8 CM
ECHO AO ASCENDING AORTA INDEX: 2.18 CM/M2
ECHO AO ROOT DIAM: 3.4 CM
ECHO AO ROOT INDEX: 1.95 CM/M2
ECHO AR MAX VEL PISA: 3 M/S
ECHO AV MEAN GRADIENT: 3 MMHG
ECHO AV MEAN VELOCITY: 0.8 M/S
ECHO AV PEAK GRADIENT: 6 MMHG
ECHO AV PEAK VELOCITY: 1.2 M/S
ECHO AV REGURGITANT PHT: 439 MS
ECHO AV VELOCITY RATIO: 0.75
ECHO AV VTI: 23.3 CM
ECHO BSA: 1.75 M2
ECHO EST RA PRESSURE: 15 MMHG
ECHO IVC EXP: 2.4 CM
ECHO IVC INSP: 2.4 CM
ECHO LA AREA 2C: 25.2 CM2
ECHO LA AREA 4C: 25.2 CM2
ECHO LA MAJOR AXIS: 6.3 CM
ECHO LA MINOR AXIS: 6.3 CM
ECHO LA VOL BP: 81 ML (ref 22–52)
ECHO LA VOL MOD A2C: 81 ML (ref 22–52)
ECHO LA VOL MOD A4C: 66 ML (ref 22–52)
ECHO LA VOL/BSA BIPLANE: 47 ML/M2 (ref 16–34)
ECHO LA VOLUME INDEX MOD A2C: 47 ML/M2 (ref 16–34)
ECHO LA VOLUME INDEX MOD A4C: 38 ML/M2 (ref 16–34)
ECHO LV E' LATERAL VELOCITY: 12.8 CM/S
ECHO LV E' SEPTAL VELOCITY: 10 CM/S
ECHO LV EDV A2C: 86 ML
ECHO LV EDV A4C: 120 ML
ECHO LV EDV INDEX A4C: 69 ML/M2
ECHO LV EDV NDEX A2C: 49 ML/M2
ECHO LV EF PHYSICIAN: 46 %
ECHO LV EJECTION FRACTION A2C: 46 %
ECHO LV EJECTION FRACTION A4C: 50 %
ECHO LV EJECTION FRACTION BIPLANE: 46 % (ref 55–100)
ECHO LV ESV A2C: 46 ML
ECHO LV ESV A4C: 60 ML
ECHO LV ESV INDEX A2C: 26 ML/M2
ECHO LV ESV INDEX A4C: 34 ML/M2
ECHO LV FRACTIONAL SHORTENING: 38 % (ref 28–44)
ECHO LV INTERNAL DIMENSION DIASTOLE INDEX: 3.16 CM/M2
ECHO LV INTERNAL DIMENSION DIASTOLIC: 5.5 CM (ref 3.9–5.3)
ECHO LV INTERNAL DIMENSION SYSTOLIC INDEX: 1.95 CM/M2
ECHO LV INTERNAL DIMENSION SYSTOLIC: 3.4 CM
ECHO LV ISOVOLUMETRIC RELAXATION TIME (IVRT): 103 MS
ECHO LV IVSD: 1.1 CM (ref 0.6–0.9)
ECHO LV MASS 2D: 242 G (ref 67–162)
ECHO LV MASS INDEX 2D: 139.1 G/M2 (ref 43–95)
ECHO LV POSTERIOR WALL DIASTOLIC: 1.1 CM (ref 0.6–0.9)
ECHO LV RELATIVE WALL THICKNESS RATIO: 0.4
ECHO LVOT AV VTI INDEX: 0.85
ECHO LVOT MEAN GRADIENT: 2 MMHG
ECHO LVOT PEAK GRADIENT: 3 MMHG
ECHO LVOT PEAK VELOCITY: 0.9 M/S
ECHO LVOT VTI: 19.7 CM
ECHO MV A VELOCITY: 1.1 M/S
ECHO MV AREA PISA: 0.2 CM2
ECHO MV E DECELERATION TIME (DT): 201 MS
ECHO MV E VELOCITY: 1.2 M/S
ECHO MV E/A RATIO: 1.09
ECHO MV E/E' LATERAL: 9.38
ECHO MV E/E' RATIO (AVERAGED): 10.69
ECHO MV E/E' SEPTAL: 12
ECHO MV LVOT VTI INDEX: 1.34
ECHO MV MAX VELOCITY: 1.4 M/S
ECHO MV MEAN GRADIENT: 8 MMHG
ECHO MV MEAN VELOCITY: 1 M/S
ECHO MV PEAK GRADIENT: 8 MMHG
ECHO MV REGURGITANT ALIASING (NYQUIST) VELOCITY: 22 CM/S
ECHO MV REGURGITANT PEAK GRADIENT: 117 MMHG
ECHO MV REGURGITANT PEAK VELOCITY: 5.4 M/S
ECHO MV REGURGITANT RADIUS PISA: 0.9 CM
ECHO MV REGURGITANT VTIA: 218 CM
ECHO MV VTI: 26.4 CM
ECHO PV MAX VELOCITY: 0.9 M/S
ECHO PV MEAN GRADIENT: 2 MMHG
ECHO PV MEAN VELOCITY: 0.6 M/S
ECHO PV PEAK GRADIENT: 4 MMHG
ECHO PV VTI: 19.1 CM
ECHO RA AREA 4C: 20.6 CM2
ECHO RA END SYSTOLIC VOLUME APICAL 4 CHAMBER INDEX BSA: 33 ML/M2
ECHO RA VOLUME: 58 ML
ECHO RIGHT VENTRICULAR SYSTOLIC PRESSURE (RVSP): 46 MMHG
ECHO RV BASAL DIMENSION: 4.2 CM
ECHO RV FREE WALL PEAK S': 12.5 CM/S
ECHO RV LONGITUDINAL DIMENSION: 7.4 CM
ECHO RV MID DIMENSION: 3.2 CM
ECHO RV TAPSE: 2.5 CM (ref 1.7–?)
ECHO TV REGURGITANT MAX VELOCITY: 2.8 M/S
ECHO TV REGURGITANT PEAK GRADIENT: 31 MMHG
EOSINOPHIL # BLD: 0 K/UL (ref 0–0.6)
EOSINOPHIL NFR BLD: 0 %
GFR SERPLBLD CREATININE-BSD FMLA CKD-EPI: 12 ML/MIN/{1.73_M2}
GLUCOSE SERPL-MCNC: 103 MG/DL (ref 70–99)
HCT VFR BLD AUTO: 22.3 % (ref 36–48)
HGB BLD-MCNC: 7.3 G/DL (ref 12–16)
LYMPHOCYTES # BLD: 0.1 K/UL (ref 1–5.1)
LYMPHOCYTES NFR BLD: 1.9 %
MCH RBC QN AUTO: 28.8 PG (ref 26–34)
MCHC RBC AUTO-ENTMCNC: 32.8 G/DL (ref 31–36)
MCV RBC AUTO: 88.1 FL (ref 80–100)
MONOCYTES # BLD: 0.2 K/UL (ref 0–1.3)
MONOCYTES NFR BLD: 2.5 %
NEUTROPHILS # BLD: 6.1 K/UL (ref 1.7–7.7)
NEUTROPHILS NFR BLD: 95.6 %
PLATELET # BLD AUTO: 85 K/UL (ref 135–450)
PMV BLD AUTO: 9.2 FL (ref 5–10.5)
POTASSIUM SERPL-SCNC: 4.6 MMOL/L (ref 3.5–5.1)
RBC # BLD AUTO: 2.54 M/UL (ref 4–5.2)
SODIUM SERPL-SCNC: 132 MMOL/L (ref 136–145)
WBC # BLD AUTO: 6.4 K/UL (ref 4–11)

## 2024-12-16 PROCEDURE — 99233 SBSQ HOSP IP/OBS HIGH 50: CPT | Performed by: INTERNAL MEDICINE

## 2024-12-16 PROCEDURE — 6370000000 HC RX 637 (ALT 250 FOR IP): Performed by: INTERNAL MEDICINE

## 2024-12-16 PROCEDURE — 80048 BASIC METABOLIC PNL TOTAL CA: CPT

## 2024-12-16 PROCEDURE — 94640 AIRWAY INHALATION TREATMENT: CPT

## 2024-12-16 PROCEDURE — 86704 HEP B CORE ANTIBODY TOTAL: CPT

## 2024-12-16 PROCEDURE — 6360000002 HC RX W HCPCS: Performed by: INTERNAL MEDICINE

## 2024-12-16 PROCEDURE — 2580000003 HC RX 258: Performed by: INTERNAL MEDICINE

## 2024-12-16 PROCEDURE — 90935 HEMODIALYSIS ONE EVALUATION: CPT

## 2024-12-16 PROCEDURE — 94761 N-INVAS EAR/PLS OXIMETRY MLT: CPT

## 2024-12-16 PROCEDURE — 2700000000 HC OXYGEN THERAPY PER DAY

## 2024-12-16 PROCEDURE — 93306 TTE W/DOPPLER COMPLETE: CPT | Performed by: INTERNAL MEDICINE

## 2024-12-16 PROCEDURE — 86803 HEPATITIS C AB TEST: CPT

## 2024-12-16 PROCEDURE — 93306 TTE W/DOPPLER COMPLETE: CPT

## 2024-12-16 PROCEDURE — 2060000000 HC ICU INTERMEDIATE R&B

## 2024-12-16 PROCEDURE — 85025 COMPLETE CBC W/AUTO DIFF WBC: CPT

## 2024-12-16 PROCEDURE — 36415 COLL VENOUS BLD VENIPUNCTURE: CPT

## 2024-12-16 PROCEDURE — 83516 IMMUNOASSAY NONANTIBODY: CPT

## 2024-12-16 RX ORDER — IPRATROPIUM BROMIDE AND ALBUTEROL SULFATE 2.5; .5 MG/3ML; MG/3ML
1 SOLUTION RESPIRATORY (INHALATION)
Status: DISCONTINUED | OUTPATIENT
Start: 2024-12-17 | End: 2024-12-20

## 2024-12-16 RX ORDER — SODIUM CHLORIDE, SODIUM LACTATE, POTASSIUM CHLORIDE, CALCIUM CHLORIDE 600; 310; 30; 20 MG/100ML; MG/100ML; MG/100ML; MG/100ML
INJECTION, SOLUTION INTRAVENOUS CONTINUOUS
Status: CANCELLED | OUTPATIENT
Start: 2024-12-16

## 2024-12-16 RX ORDER — SODIUM CHLORIDE 0.9 % (FLUSH) 0.9 %
5-40 SYRINGE (ML) INJECTION PRN
Status: CANCELLED | OUTPATIENT
Start: 2024-12-16

## 2024-12-16 RX ORDER — ALBUTEROL SULFATE 90 UG/1
2 INHALANT RESPIRATORY (INHALATION)
Status: DISCONTINUED | OUTPATIENT
Start: 2024-12-16 | End: 2024-12-16

## 2024-12-16 RX ORDER — SODIUM CHLORIDE 9 MG/ML
INJECTION, SOLUTION INTRAVENOUS PRN
Status: CANCELLED | OUTPATIENT
Start: 2024-12-16

## 2024-12-16 RX ORDER — HYDROCODONE BITARTRATE AND ACETAMINOPHEN 5; 325 MG/1; MG/1
1 TABLET ORAL EVERY 4 HOURS PRN
Status: DISCONTINUED | OUTPATIENT
Start: 2024-12-16 | End: 2024-12-24 | Stop reason: HOSPADM

## 2024-12-16 RX ORDER — IPRATROPIUM BROMIDE AND ALBUTEROL SULFATE 2.5; .5 MG/3ML; MG/3ML
1 SOLUTION RESPIRATORY (INHALATION)
Status: DISCONTINUED | OUTPATIENT
Start: 2024-12-16 | End: 2024-12-16

## 2024-12-16 RX ORDER — SODIUM CHLORIDE 0.9 % (FLUSH) 0.9 %
5-40 SYRINGE (ML) INJECTION EVERY 12 HOURS SCHEDULED
Status: CANCELLED | OUTPATIENT
Start: 2024-12-16

## 2024-12-16 RX ADMIN — BUSPIRONE HYDROCHLORIDE 10 MG: 10 TABLET ORAL at 21:10

## 2024-12-16 RX ADMIN — IPRATROPIUM BROMIDE AND ALBUTEROL SULFATE 1 DOSE: 2.5; .5 SOLUTION RESPIRATORY (INHALATION) at 15:10

## 2024-12-16 RX ADMIN — MIDODRINE HYDROCHLORIDE 10 MG: 10 TABLET ORAL at 15:21

## 2024-12-16 RX ADMIN — HYDROMORPHONE HYDROCHLORIDE 0.5 MG: 1 INJECTION, SOLUTION INTRAMUSCULAR; INTRAVENOUS; SUBCUTANEOUS at 10:27

## 2024-12-16 RX ADMIN — HYDROCODONE BITARTRATE AND ACETAMINOPHEN 1 TABLET: 5; 325 TABLET ORAL at 22:46

## 2024-12-16 RX ADMIN — ALBUTEROL SULFATE 2 PUFF: 90 AEROSOL, METERED RESPIRATORY (INHALATION) at 07:15

## 2024-12-16 RX ADMIN — BUSPIRONE HYDROCHLORIDE 10 MG: 10 TABLET ORAL at 08:43

## 2024-12-16 RX ADMIN — HYDROCODONE BITARTRATE AND ACETAMINOPHEN 1 TABLET: 5; 325 TABLET ORAL at 00:07

## 2024-12-16 RX ADMIN — WATER 125 MG: 1 INJECTION INTRAMUSCULAR; INTRAVENOUS; SUBCUTANEOUS at 08:55

## 2024-12-16 RX ADMIN — IPRATROPIUM BROMIDE AND ALBUTEROL SULFATE 1 DOSE: 2.5; .5 SOLUTION RESPIRATORY (INHALATION) at 19:37

## 2024-12-16 RX ADMIN — POLYETHYLENE GLYCOL (3350) 17 G: 17 POWDER, FOR SOLUTION ORAL at 21:07

## 2024-12-16 RX ADMIN — SODIUM CHLORIDE, PRESERVATIVE FREE 10 ML: 5 INJECTION INTRAVENOUS at 08:42

## 2024-12-16 RX ADMIN — FOLIC ACID 1 MG: 1 TABLET ORAL at 08:43

## 2024-12-16 RX ADMIN — PANTOPRAZOLE SODIUM 40 MG: 40 INJECTION, POWDER, FOR SOLUTION INTRAVENOUS at 08:53

## 2024-12-16 RX ADMIN — PIPERACILLIN AND TAZOBACTAM 3375 MG: 3; .375 INJECTION, POWDER, LYOPHILIZED, FOR SOLUTION INTRAVENOUS at 15:21

## 2024-12-16 RX ADMIN — PIPERACILLIN AND TAZOBACTAM 3375 MG: 3; .375 INJECTION, POWDER, LYOPHILIZED, FOR SOLUTION INTRAVENOUS at 23:39

## 2024-12-16 RX ADMIN — POLYETHYLENE GLYCOL (3350) 17 G: 17 POWDER, FOR SOLUTION ORAL at 09:00

## 2024-12-16 RX ADMIN — MIDODRINE HYDROCHLORIDE 10 MG: 10 TABLET ORAL at 08:43

## 2024-12-16 RX ADMIN — SODIUM CHLORIDE, PRESERVATIVE FREE 10 ML: 5 INJECTION INTRAVENOUS at 21:09

## 2024-12-16 RX ADMIN — HYDROCODONE BITARTRATE AND ACETAMINOPHEN 1 TABLET: 5; 325 TABLET ORAL at 08:43

## 2024-12-16 RX ADMIN — HYDROCODONE BITARTRATE AND ACETAMINOPHEN 1 TABLET: 5; 325 TABLET ORAL at 04:11

## 2024-12-16 ASSESSMENT — PAIN SCALES - GENERAL
PAINLEVEL_OUTOF10: 10
PAINLEVEL_OUTOF10: 10
PAINLEVEL_OUTOF10: 7
PAINLEVEL_OUTOF10: 8
PAINLEVEL_OUTOF10: 0
PAINLEVEL_OUTOF10: 7

## 2024-12-16 ASSESSMENT — PAIN DESCRIPTION - DESCRIPTORS
DESCRIPTORS: ACHING
DESCRIPTORS: ACHING
DESCRIPTORS: ACHING;THROBBING
DESCRIPTORS: ACHING;DISCOMFORT;THROBBING

## 2024-12-16 ASSESSMENT — PAIN DESCRIPTION - LOCATION
LOCATION: GENERALIZED
LOCATION: SHOULDER

## 2024-12-16 ASSESSMENT — PAIN - FUNCTIONAL ASSESSMENT: PAIN_FUNCTIONAL_ASSESSMENT: PREVENTS OR INTERFERES SOME ACTIVE ACTIVITIES AND ADLS

## 2024-12-16 ASSESSMENT — PAIN DESCRIPTION - ORIENTATION: ORIENTATION: LEFT

## 2024-12-16 NOTE — PLAN OF CARE
Problem: Discharge Planning  Goal: Discharge to home or other facility with appropriate resources  12/16/2024 0932 by Julieta Mchugh RN  Outcome: Progressing  12/16/2024 0039 by Nicolette Otero RN  Outcome: Progressing     Problem: Pain  Goal: Verbalizes/displays adequate comfort level or baseline comfort level  12/16/2024 0932 by Julieta Mchugh RN  Outcome: Progressing  12/16/2024 0039 by Nicolette Otero RN  Outcome: Progressing     Problem: Safety - Adult  Goal: Free from fall injury  12/16/2024 0932 by Julieta Mchugh RN  Outcome: Progressing  12/16/2024 0039 by Nicolette Otero RN  Outcome: Progressing     Problem: ABCDS Injury Assessment  Goal: Absence of physical injury  12/16/2024 0932 by Julieta Mchugh RN  Outcome: Progressing  12/16/2024 0039 by Nicolette Otero RN  Outcome: Progressing     Problem: Cardiovascular - Adult  Goal: Maintains optimal cardiac output and hemodynamic stability  12/16/2024 0932 by Julieta Mchugh RN  Outcome: Progressing  12/16/2024 0039 by Nicolette Otero RN  Outcome: Progressing     Problem: Genitourinary - Adult  Goal: Urinary catheter remains patent  Outcome: Progressing     Problem: Hematologic - Adult  Goal: Maintains hematologic stability  Outcome: Progressing     Problem: Musculoskeletal - Adult  Goal: Return mobility to safest level of function  Outcome: Progressing     Problem: Nutrition Deficit:  Goal: Optimize nutritional status  Outcome: Progressing     Problem: Skin/Tissue Integrity  Goal: Absence of new skin breakdown  Description: 1.  Monitor for areas of redness and/or skin breakdown  2.  Assess vascular access sites hourly  3.  Every 4-6 hours minimum:  Change oxygen saturation probe site  4.  Every 4-6 hours:  If on nasal continuous positive airway pressure, respiratory therapy assess nares and determine need for appliance change or resting period.  Outcome: Progressing

## 2024-12-16 NOTE — PLAN OF CARE
Problem: Discharge Planning  Goal: Discharge to home or other facility with appropriate resources  Outcome: Progressing     Problem: Pain  Goal: Verbalizes/displays adequate comfort level or baseline comfort level  Outcome: Progressing     Problem: Safety - Adult  Goal: Free from fall injury  Outcome: Progressing     Problem: ABCDS Injury Assessment  Goal: Absence of physical injury  Outcome: Progressing     Problem: Cardiovascular - Adult  Goal: Maintains optimal cardiac output and hemodynamic stability  Outcome: Progressing

## 2024-12-16 NOTE — CONSULTS
[facility]  Palliative Medicine Consultation Note      Date Of Admission:12/10/2024  Date of consult: 24  Seen by PC in the past:  No    Recommendations:        Writer met with the pt at bedside to introduce palliative/hospice options and had a voluntary discussion related to advance care planning. Pt is worn out after HD session and requests for writer to follow up with her tomorrow related to goals of care. Pt may need new HD slot as out-pt. Pt's niece,Suzanna, will be here tomorrow to help fill out medicaid paperwork. Pt is not ready to make decisions related to comfort care vrs continuing with HD.     1. Goals of Care/Advanced Care planning/Code status: Full Code  2. Pain: chronic pain; pt states she hurts all over her body,Dilaudid given per nursing  3. SOB: Denies  4. Disposition: to be decided    Reason for Consult:         [x]  Goals of Care  [x]  Code Status Discussion/Advanced Care Planning   []  Psychosocial/Family Support  []  Symptom Management  []  Other (Specify)    Requesting Physician: Dr. Gould    CHIEF COMPLAINT:  Adult failure to thrive    History Obtained From:  patient    History of Present Illness:         Emily Alegre is a 69 y.o. female with PMH of (see below list) who presented with Adult failure to thrive,dehydration, Hypomagnesemia,hyponatremia, Ileus,right arm weakness,anemia, acute renal failure. Plan to be decided.    Subjective:         Past Medical History:        Diagnosis Date    Asthma     Chronic bronchiolitis (HCC)     Throat cancer (HCC)        Past Surgical History:        Procedure Laterality Date     SECTION      IR NONTUNNELED VASCULAR CATHETER  2024    IR NONTUNNELED VASCULAR CATHETER 2024 [facility] SPECIAL PROCEDURES    TRACHEOSTOMY  2024    UTERINE SUSPENSION         Current Medications:    Medications Prior to Admission: lidocaine (LIDODERM) 5 %, Place 1 patch onto the skin daily 12 hours on, 12 hours off.  vitamin D 25

## 2024-12-16 NOTE — FLOWSHEET NOTE
12/16/24 0720   Vital Signs   Temp 97.5 °F (36.4 °C)   Temp Source Axillary   Pulse 60   Heart Rate Source Monitor   Respirations 17   BP (!) 130/91   MAP (Calculated) 104   BP Location Left upper arm   BP Method Automatic   Patient Position Semi fowlers     Assessment & vital signs completed. Pt complaining of pain-prn medication given. Pt was supposed to be NPO but had water and Ensure during the night. Bronch rescheduled to tomorrow morning, ENDO aware. Orders placed for NPO @ midnight, no exceptions. Call light within reach, pt is stable.

## 2024-12-16 NOTE — CARE COORDINATION
12/13 7:01 PM Received VM from patient's niece. The patient has agreed to go to [facility] when discharged.     9:11 - LVM for Vidhya at [place] requesting an update on bed/precert.     Per chart review the patient had dialysis yesterday and will have again today.     Spoke to Rox WOODS to ask if the dialysis is temporary or if it will be permanent. Rox does not know but will watch for Dr. Duffy to be on the floor to discuss the plan.    Spoke with monique Briseno who reports she spoke to someone today regarding hospice. Suzanna and the patient's brother will be at [place] in the morning to discuss hospice.

## 2024-12-16 NOTE — FLOWSHEET NOTE
12/15/24 2011   Assessment   Charting Type Shift assessment   Psychosocial   Psychosocial (WDL) X   Patient Behaviors Anxious   Neurological   Neuro (WDL) X   Level of Consciousness 0   Orientation Level Oriented X4   Cognition Follows commands   Speech Clear   R Hand  Moderate   L Hand  Moderate   RUE Motor Response Responds to command   LUE Motor Response Responds to command   RLE Motor Response Responds to command   RLE Sensation  Pain   LLE Motor Response Responds to command   LLE Sensation  Pain   Regi Coma Scale   Eye Opening 4   Best Verbal Response 5   Best Motor Response 6   Regi Coma Scale Score 15   Respiratory   Respiratory (WDL) X   Respiratory Interventions H.O.B. elevated   Respiratory Pattern Regular   Respiratory Depth Normal   Respiratory Quality/Effort Dyspnea with exertion   Chest Assessment Chest expansion symmetrical;Trachea midline   L Breath Sounds Crackles   R Breath Sounds Crackles   Level of Activity/Mobility 1   Subcutaneous Air/Crepitus None   Cough/Sputum   Sputum How Obtained Spontaneous cough   Cough Productive   Frequency Infrequent   Sputum Amount Moderate   Sputum Color Red   Cardiac   Cardiac (WDL) WDL   Cardiac Rhythm Sinus rhythm   Cardiac Monitor   Cardiac/Telemetry Monitor On Portable telemetry pack applied   Telemetry Monitor Alarm Parameters PCU   Gastrointestinal   Abdominal (WDL) X   Abdomen Inspection Rounded;Soft   RUQ Bowel Sounds Active   LUQ Bowel Sounds Active   RLQ Bowel Sounds Active   LLQ Bowel Sounds Active   GI Symptoms Reduction in appetite   Tenderness Soft;Nontender   Hernia Abdominal   Genitourinary   Genitourinary (WDL) X   Suprapubic Tenderness No   Dysuria (Pain/Burning w/Urination) KHLOE   Difficulty Urinating/[place] KHLOE   Urine Assessment   Urinary Status Zapien   Peripheral Vascular   Peripheral Vascular (WDL) X   Edema Right lower extremity;Left lower extremity   RLE Edema +2   LLE Edema +2;Trace   Anti-Embolism   Anti-Embolism

## 2024-12-17 ENCOUNTER — ANESTHESIA (OUTPATIENT)
Dept: ENDOSCOPY | Age: 69
DRG: 640 | End: 2024-12-17
Payer: MEDICARE

## 2024-12-17 PROBLEM — R91.8 ENDOBRONCHIAL MASS: Status: ACTIVE | Noted: 2024-12-17

## 2024-12-17 PROBLEM — R93.89 ABNORMAL CT OF THE CHEST: Status: ACTIVE | Noted: 2024-12-17

## 2024-12-17 PROBLEM — I77.82 ANCA-ASSOCIATED VASCULITIS (HCC): Status: ACTIVE | Noted: 2024-12-17

## 2024-12-17 LAB
ABO + RH BLD: NORMAL
ANION GAP SERPL CALCULATED.3IONS-SCNC: 11 MMOL/L (ref 3–16)
ANION GAP SERPL CALCULATED.3IONS-SCNC: 11 MMOL/L (ref 3–16)
APPEARANCE BRONCH: CLEAR
BACTERIA SPEC RESP CULT: ABNORMAL
BASOPHILS # BLD: 0 K/UL (ref 0–0.2)
BASOPHILS NFR BLD: 0.1 %
BLD GP AB SCN SERPL QL: NORMAL
BUN SERPL-MCNC: 43 MG/DL (ref 7–20)
BUN SERPL-MCNC: 43 MG/DL (ref 7–20)
CALCIUM SERPL-MCNC: 7.7 MG/DL (ref 8.3–10.6)
CALCIUM SERPL-MCNC: 7.8 MG/DL (ref 8.3–10.6)
CHLORIDE SERPL-SCNC: 95 MMOL/L (ref 99–110)
CHLORIDE SERPL-SCNC: 95 MMOL/L (ref 99–110)
CLOT SPEC QL: ABNORMAL
CO2 SERPL-SCNC: 26 MMOL/L (ref 21–32)
CO2 SERPL-SCNC: 26 MMOL/L (ref 21–32)
COLOR BRONCH: ABNORMAL
COLOR BRONCH: ABNORMAL
COLOR BRONCH: COLORLESS
COLOR BRONCH: COLORLESS
CREAT SERPL-MCNC: 2.7 MG/DL (ref 0.6–1.2)
CREAT SERPL-MCNC: 2.7 MG/DL (ref 0.6–1.2)
DEPRECATED RDW RBC AUTO: 16.8 % (ref 12.4–15.4)
EOSINOPHIL # BLD: 0 K/UL (ref 0–0.6)
EOSINOPHIL NFR BLD: 0.1 %
GFR SERPLBLD CREATININE-BSD FMLA CKD-EPI: 19 ML/MIN/{1.73_M2}
GFR SERPLBLD CREATININE-BSD FMLA CKD-EPI: 19 ML/MIN/{1.73_M2}
GLUCOSE SERPL-MCNC: 91 MG/DL (ref 70–99)
GLUCOSE SERPL-MCNC: 91 MG/DL (ref 70–99)
GRAM STN SPEC: ABNORMAL
HCT VFR BLD AUTO: 21.4 % (ref 36–48)
HCT VFR BLD AUTO: 23.8 % (ref 36–48)
HCV AB SERPL QL IA: NORMAL
HGB BLD-MCNC: 7.1 G/DL (ref 12–16)
HGB BLD-MCNC: 7.9 G/DL (ref 12–16)
LYMPHOCYTES # BLD: 0.2 K/UL (ref 1–5.1)
LYMPHOCYTES NFR BLD: 2.6 %
LYMPHOCYTES NFR BRONCH MANUAL: 13 % (ref 5–10)
LYMPHOCYTES NFR BRONCH MANUAL: 2 % (ref 5–10)
LYMPHOCYTES NFR BRONCH MANUAL: 3 % (ref 5–10)
LYMPHOCYTES NFR BRONCH MANUAL: 5 % (ref 5–10)
MCH RBC QN AUTO: 28.6 PG (ref 26–34)
MCHC RBC AUTO-ENTMCNC: 33.1 G/DL (ref 31–36)
MCV RBC AUTO: 86.5 FL (ref 80–100)
MONOCYTES # BLD: 0.4 K/UL (ref 0–1.3)
MONOCYTES NFR BLD: 4.8 %
MONOCYTES NFR BRONCH MANUAL: 1 %
NEUTROPHILS # BLD: 7.5 K/UL (ref 1.7–7.7)
NEUTROPHILS NFR BLD: 92.4 %
NEUTROPHILS NFR BRONCH MANUAL: 87 % (ref 5–10)
NEUTROPHILS NFR BRONCH MANUAL: 95 % (ref 5–10)
NEUTROPHILS NFR BRONCH MANUAL: 96 % (ref 5–10)
NEUTROPHILS NFR BRONCH MANUAL: 98 % (ref 5–10)
NUC CELL # BRONCH MANUAL: 187 /CUMM
NUC CELL # BRONCH MANUAL: 63 /CUMM
NUC CELL # BRONCH MANUAL: 74 /CUMM
NUC CELL # BRONCH MANUAL: 84 /CUMM
ORGANISM: ABNORMAL
PLATELET # BLD AUTO: 98 K/UL (ref 135–450)
PMV BLD AUTO: 8.2 FL (ref 5–10.5)
POTASSIUM SERPL-SCNC: 4.4 MMOL/L (ref 3.5–5.1)
POTASSIUM SERPL-SCNC: 4.4 MMOL/L (ref 3.5–5.1)
RBC # BLD AUTO: 2.47 M/UL (ref 4–5.2)
RBC # FLD MANUAL: 1900 /CUMM
RBC # FLD MANUAL: 6300 /CUMM
RBC # FLD MANUAL: 800 /CUMM
RBC # FLD MANUAL: 900 /CUMM
RSV SOURCE: NORMAL
RSV SOURCE: NORMAL
SODIUM SERPL-SCNC: 132 MMOL/L (ref 136–145)
SODIUM SERPL-SCNC: 132 MMOL/L (ref 136–145)
TOTAL CELLS COUNTED BRONCH: 100
WBC # BLD AUTO: 8.1 K/UL (ref 4–11)

## 2024-12-17 PROCEDURE — 6370000000 HC RX 637 (ALT 250 FOR IP): Performed by: INTERNAL MEDICINE

## 2024-12-17 PROCEDURE — 7100000010 HC PHASE II RECOVERY - FIRST 15 MIN: Performed by: INTERNAL MEDICINE

## 2024-12-17 PROCEDURE — 94640 AIRWAY INHALATION TREATMENT: CPT

## 2024-12-17 PROCEDURE — 6360000002 HC RX W HCPCS: Performed by: INTERNAL MEDICINE

## 2024-12-17 PROCEDURE — 87077 CULTURE AEROBIC IDENTIFY: CPT

## 2024-12-17 PROCEDURE — 85018 HEMOGLOBIN: CPT

## 2024-12-17 PROCEDURE — 36430 TRANSFUSION BLD/BLD COMPNT: CPT

## 2024-12-17 PROCEDURE — 99233 SBSQ HOSP IP/OBS HIGH 50: CPT | Performed by: INTERNAL MEDICINE

## 2024-12-17 PROCEDURE — 87631 RESP VIRUS 3-5 TARGETS: CPT

## 2024-12-17 PROCEDURE — 99152 MOD SED SAME PHYS/QHP 5/>YRS: CPT | Performed by: INTERNAL MEDICINE

## 2024-12-17 PROCEDURE — 86923 COMPATIBILITY TEST ELECTRIC: CPT

## 2024-12-17 PROCEDURE — 2709999900 HC NON-CHARGEABLE SUPPLY: Performed by: INTERNAL MEDICINE

## 2024-12-17 PROCEDURE — 3609011300 HC BRONCHOSCOPY BRONCHIAL/ENDOBRNCL BX 1+ SITES: Performed by: INTERNAL MEDICINE

## 2024-12-17 PROCEDURE — 2700000000 HC OXYGEN THERAPY PER DAY

## 2024-12-17 PROCEDURE — 87116 MYCOBACTERIA CULTURE: CPT

## 2024-12-17 PROCEDURE — 31635 BRONCHOSCOPY W/FB REMOVAL: CPT | Performed by: INTERNAL MEDICINE

## 2024-12-17 PROCEDURE — 89051 BODY FLUID CELL COUNT: CPT

## 2024-12-17 PROCEDURE — 2580000003 HC RX 258: Performed by: INTERNAL MEDICINE

## 2024-12-17 PROCEDURE — 87205 SMEAR GRAM STAIN: CPT

## 2024-12-17 PROCEDURE — 94761 N-INVAS EAR/PLS OXIMETRY MLT: CPT

## 2024-12-17 PROCEDURE — 80048 BASIC METABOLIC PNL TOTAL CA: CPT

## 2024-12-17 PROCEDURE — 86900 BLOOD TYPING SEROLOGIC ABO: CPT

## 2024-12-17 PROCEDURE — 88305 TISSUE EXAM BY PATHOLOGIST: CPT

## 2024-12-17 PROCEDURE — 2500000003 HC RX 250 WO HCPCS: Performed by: INTERNAL MEDICINE

## 2024-12-17 PROCEDURE — 31624 DX BRONCHOSCOPE/LAVAGE: CPT | Performed by: INTERNAL MEDICINE

## 2024-12-17 PROCEDURE — 85025 COMPLETE CBC W/AUTO DIFF WBC: CPT

## 2024-12-17 PROCEDURE — 7100000011 HC PHASE II RECOVERY - ADDTL 15 MIN: Performed by: INTERNAL MEDICINE

## 2024-12-17 PROCEDURE — 87102 FUNGUS ISOLATION CULTURE: CPT

## 2024-12-17 PROCEDURE — P9016 RBC LEUKOCYTES REDUCED: HCPCS

## 2024-12-17 PROCEDURE — 87081 CULTURE SCREEN ONLY: CPT

## 2024-12-17 PROCEDURE — 88312 SPECIAL STAINS GROUP 1: CPT

## 2024-12-17 PROCEDURE — 0BDK8ZX EXTRACTION OF RIGHT LUNG, VIA NATURAL OR ARTIFICIAL OPENING ENDOSCOPIC, DIAGNOSTIC: ICD-10-PCS | Performed by: INTERNAL MEDICINE

## 2024-12-17 PROCEDURE — 85014 HEMATOCRIT: CPT

## 2024-12-17 PROCEDURE — 0B9C8ZX DRAINAGE OF RIGHT UPPER LUNG LOBE, VIA NATURAL OR ARTIFICIAL OPENING ENDOSCOPIC, DIAGNOSTIC: ICD-10-PCS | Performed by: INTERNAL MEDICINE

## 2024-12-17 PROCEDURE — 86850 RBC ANTIBODY SCREEN: CPT

## 2024-12-17 PROCEDURE — 3609010800 HC BRONCHOSCOPY ALVEOLAR LAVAGE: Performed by: INTERNAL MEDICINE

## 2024-12-17 PROCEDURE — 87106 FUNGI IDENTIFICATION YEAST: CPT

## 2024-12-17 PROCEDURE — 87186 SC STD MICRODIL/AGAR DIL: CPT

## 2024-12-17 PROCEDURE — 86901 BLOOD TYPING SEROLOGIC RH(D): CPT

## 2024-12-17 PROCEDURE — 99153 MOD SED SAME PHYS/QHP EA: CPT | Performed by: INTERNAL MEDICINE

## 2024-12-17 PROCEDURE — 31625 BRONCHOSCOPY W/BIOPSY(S): CPT | Performed by: INTERNAL MEDICINE

## 2024-12-17 PROCEDURE — 87252 VIRUS INOCULATION TISSUE: CPT

## 2024-12-17 PROCEDURE — 2060000000 HC ICU INTERMEDIATE R&B

## 2024-12-17 PROCEDURE — 2720000010 HC SURG SUPPLY STERILE: Performed by: INTERNAL MEDICINE

## 2024-12-17 PROCEDURE — 87070 CULTURE OTHR SPECIMN AEROBIC: CPT

## 2024-12-17 PROCEDURE — 88112 CYTOPATH CELL ENHANCE TECH: CPT

## 2024-12-17 PROCEDURE — 36415 COLL VENOUS BLD VENIPUNCTURE: CPT

## 2024-12-17 PROCEDURE — 87632 RESP VIRUS 6-11 TARGETS: CPT

## 2024-12-17 PROCEDURE — 87206 SMEAR FLUORESCENT/ACID STAI: CPT

## 2024-12-17 RX ORDER — SULFAMETHOXAZOLE AND TRIMETHOPRIM 800; 160 MG/1; MG/1
0.5 TABLET ORAL
Status: DISCONTINUED | OUTPATIENT
Start: 2024-12-18 | End: 2024-12-24 | Stop reason: HOSPADM

## 2024-12-17 RX ORDER — MIDAZOLAM HYDROCHLORIDE 5 MG/ML
INJECTION, SOLUTION INTRAMUSCULAR; INTRAVENOUS PRN
Status: DISCONTINUED | OUTPATIENT
Start: 2024-12-17 | End: 2024-12-17 | Stop reason: ALTCHOICE

## 2024-12-17 RX ORDER — FENTANYL CITRATE 50 UG/ML
INJECTION, SOLUTION INTRAMUSCULAR; INTRAVENOUS PRN
Status: DISCONTINUED | OUTPATIENT
Start: 2024-12-17 | End: 2024-12-17 | Stop reason: ALTCHOICE

## 2024-12-17 RX ORDER — SODIUM CHLORIDE 9 MG/ML
INJECTION, SOLUTION INTRAVENOUS PRN
Status: DISCONTINUED | OUTPATIENT
Start: 2024-12-17 | End: 2024-12-21

## 2024-12-17 RX ADMIN — METHYLPREDNISOLONE SODIUM SUCCINATE 1000 MG: 1 INJECTION INTRAMUSCULAR; INTRAVENOUS at 09:58

## 2024-12-17 RX ADMIN — BUSPIRONE HYDROCHLORIDE 10 MG: 10 TABLET ORAL at 09:31

## 2024-12-17 RX ADMIN — HYDROCODONE BITARTRATE AND ACETAMINOPHEN 1 TABLET: 5; 325 TABLET ORAL at 22:28

## 2024-12-17 RX ADMIN — MIDODRINE HYDROCHLORIDE 10 MG: 10 TABLET ORAL at 09:31

## 2024-12-17 RX ADMIN — PIPERACILLIN AND TAZOBACTAM 3375 MG: 3; .375 INJECTION, POWDER, LYOPHILIZED, FOR SOLUTION INTRAVENOUS at 23:44

## 2024-12-17 RX ADMIN — POLYETHYLENE GLYCOL (3350) 17 G: 17 POWDER, FOR SOLUTION ORAL at 21:14

## 2024-12-17 RX ADMIN — HYDROCODONE BITARTRATE AND ACETAMINOPHEN 1 TABLET: 5; 325 TABLET ORAL at 09:31

## 2024-12-17 RX ADMIN — HYDROCODONE BITARTRATE AND ACETAMINOPHEN 1 TABLET: 5; 325 TABLET ORAL at 04:22

## 2024-12-17 RX ADMIN — PANTOPRAZOLE SODIUM 40 MG: 40 INJECTION, POWDER, FOR SOLUTION INTRAVENOUS at 09:31

## 2024-12-17 RX ADMIN — BUSPIRONE HYDROCHLORIDE 10 MG: 10 TABLET ORAL at 21:16

## 2024-12-17 RX ADMIN — IPRATROPIUM BROMIDE AND ALBUTEROL SULFATE 1 DOSE: 2.5; .5 SOLUTION RESPIRATORY (INHALATION) at 15:20

## 2024-12-17 RX ADMIN — IPRATROPIUM BROMIDE AND ALBUTEROL SULFATE 1 DOSE: 2.5; .5 SOLUTION RESPIRATORY (INHALATION) at 20:31

## 2024-12-17 RX ADMIN — Medication 1 TABLET: at 17:53

## 2024-12-17 RX ADMIN — SODIUM CHLORIDE, PRESERVATIVE FREE 10 ML: 5 INJECTION INTRAVENOUS at 21:15

## 2024-12-17 RX ADMIN — IPRATROPIUM BROMIDE AND ALBUTEROL SULFATE 1 DOSE: 2.5; .5 SOLUTION RESPIRATORY (INHALATION) at 07:08

## 2024-12-17 RX ADMIN — FOLIC ACID 1 MG: 1 TABLET ORAL at 09:31

## 2024-12-17 RX ADMIN — PIPERACILLIN AND TAZOBACTAM 3375 MG: 3; .375 INJECTION, POWDER, LYOPHILIZED, FOR SOLUTION INTRAVENOUS at 14:07

## 2024-12-17 ASSESSMENT — PAIN DESCRIPTION - DESCRIPTORS
DESCRIPTORS: ACHING;SHARP
DESCRIPTORS: ACHING;SHARP
DESCRIPTORS: ACHING
DESCRIPTORS: SHARP;ACHING

## 2024-12-17 ASSESSMENT — PAIN SCALES - GENERAL
PAINLEVEL_OUTOF10: 9
PAINLEVEL_OUTOF10: 10

## 2024-12-17 ASSESSMENT — PAIN DESCRIPTION - LOCATION
LOCATION: ABDOMEN
LOCATION: GENERALIZED;HAND
LOCATION: GENERALIZED

## 2024-12-17 ASSESSMENT — PAIN DESCRIPTION - ORIENTATION: ORIENTATION: MID

## 2024-12-17 ASSESSMENT — PAIN - FUNCTIONAL ASSESSMENT: PAIN_FUNCTIONAL_ASSESSMENT: PREVENTS OR INTERFERES SOME ACTIVE ACTIVITIES AND ADLS

## 2024-12-17 NOTE — FLOWSHEET NOTE
12/16/24 1944   Handoff   Communication Given Shift Handoff   Handoff Given To CHUCK Vázquez   Handoff Received From CHUCK Rendon   Handoff Communication Face to Face   Time Handoff Given 1944   End of Shift Check Performed Yes     EOS report given to CHUCK Vázquez. Pt in bed, call light within reach. Pt is stable, care is transferred.

## 2024-12-17 NOTE — FLOWSHEET NOTE
12/17/24 0405   Vital Signs   Temp 98.2 °F (36.8 °C)   Temp Source Axillary   Pulse 81   Heart Rate Source Monitor   Respirations 18   BP (!) 135/90   MAP (Calculated) 105   BP Location Left Upper Arm   BP Method Automatic   Patient Position Semi jose de jesuswlers   Pain Assessment   Pain Assessment 0-10   Pain Level 10   Patient's Stated Pain Goal 0 - No pain   Pain Location Generalized   Care Plan - Pain Goals   Verbalizes/displays adequate comfort level or baseline comfort level Encourage patient to monitor pain and request assistance   Oxygen Therapy   SpO2 95 %   Pulse Oximetry Type Intermittent   Pulse Oximeter Device Mode Intermittent   Pulse Oximeter Device Location Right;Finger   O2 Device High flow nasal cannula   Skin Assessment Clean, dry, & intact   O2 Flow Rate (L/min) 5 L/min   Oxygen Therapy Supplemental oxygen

## 2024-12-17 NOTE — PLAN OF CARE
Problem: Discharge Planning  Goal: Discharge to home or other facility with appropriate resources  12/16/2024 2329 by Kathie Hawthorne RN  Outcome: Progressing  Flowsheets (Taken 12/16/2024 2046)  Discharge to home or other facility with appropriate resources: Identify barriers to discharge with patient and caregiver  12/16/2024 0932 by Julieta Mchugh RN  Outcome: Progressing     Problem: Pain  Goal: Verbalizes/displays adequate comfort level or baseline comfort level  12/16/2024 2329 by Kathie Hawthorne RN  Outcome: Progressing  12/16/2024 0932 by Julieta Mchugh RN  Outcome: Progressing     Problem: Safety - Adult  Goal: Free from fall injury  12/16/2024 2329 by Kathie Hawthorne RN  Outcome: Progressing  12/16/2024 0932 by Julieta Mchugh RN  Outcome: Progressing     Problem: ABCDS Injury Assessment  Goal: Absence of physical injury  12/16/2024 2329 by Kathie Hawthorne RN  Outcome: Progressing  12/16/2024 0932 by Julieta Mchugh RN  Outcome: Progressing     Problem: Cardiovascular - Adult  Goal: Maintains optimal cardiac output and hemodynamic stability  12/16/2024 2329 by Kathie Hawthorne RN  Outcome: Progressing  Flowsheets (Taken 12/16/2024 2046)  Maintains optimal cardiac output and hemodynamic stability: Monitor blood pressure and heart rate  12/16/2024 0932 by Julieta Mchugh RN  Outcome: Progressing     Problem: Genitourinary - Adult  Goal: Urinary catheter remains patent  12/16/2024 2329 by Kathie Hawthorne RN  Outcome: Progressing  Flowsheets (Taken 12/16/2024 2046)  Urinary catheter remains patent: Assess patency of urinary catheter  12/16/2024 0932 by Julieta Mchugh RN  Outcome: Progressing     Problem: Hematologic - Adult  Goal: Maintains hematologic stability  12/16/2024 2329 by Kathie Hawthorne RN  Outcome: Progressing  Flowsheets (Taken 12/16/2024 2046)  Maintains hematologic stability: Assess for signs and symptoms of bleeding or

## 2024-12-17 NOTE — FLOWSHEET NOTE
12/16/24 2343   Vital Signs   Temp 97.5 °F (36.4 °C)   Temp Source Oral   Pulse 73   Heart Rate Source Monitor   Respirations 16   BP (!) 143/83   MAP (Calculated) 103   BP Location Left Upper Arm   BP Method Automatic   Patient Position Semi fowlers   Pain Assessment   Pain Assessment None - Denies Pain   Care Plan - Pain Goals   Verbalizes/displays adequate comfort level or baseline comfort level Encourage patient to monitor pain and request assistance   Oxygen Therapy   SpO2 98 %   Pulse Oximetry Type Intermittent   Pulse Oximeter Device Mode Intermittent   Pulse Oximeter Device Location Right;Finger   O2 Device High flow nasal cannula   Skin Assessment Clean, dry, & intact   O2 Flow Rate (L/min) 5 L/min   Oxygen Therapy Supplemental oxygen

## 2024-12-17 NOTE — FLOWSHEET NOTE
12/16/24 1936   Vital Signs   Temp 98.4 °F (36.9 °C)   Temp Source Oral   Pulse 91   Heart Rate Source Monitor   Respirations 18   BP (!) 140/79   MAP (Calculated) 99   BP Location Right Upper Arm   BP Method Automatic   Patient Position Semi fowlers   Oxygen Therapy   SpO2 93 %   O2 Device High flow nasal cannula   O2 Flow Rate (L/min) 5 L/min     Pt resting comfortably in bed. Bed alarm on, call light within reach. No needs expressed at this time. Will continue to monitor.

## 2024-12-17 NOTE — H&P
Fiberoptic bronchoscopy history:     Nursing History and Physical reviewed and agreed upon:  For additional findings, please see my notes in EPIC.      Patient is allergic to percocet [oxycodone-acetaminophen].    Past Medical History:   Diagnosis Date    Asthma     Chronic bronchiolitis (HCC)     Throat cancer (HCC)        Past Surgical History:   Procedure Laterality Date     SECTION      IR NONTUNNELED VASCULAR CATHETER  2024    IR NONTUNNELED VASCULAR CATHETER 2024 [facility] SPECIAL PROCEDURES    TRACHEOSTOMY  2024    UTERINE SUSPENSION         Allergies   Allergen Reactions    Percocet [Oxycodone-Acetaminophen] Itching       Current Facility-Administered Medications   Medication Dose Route Frequency Provider Last Rate Last Admin    0.9 % sodium chloride infusion   IntraVENous PRN Jonathan Gould MD        [START ON 2024] sulfamethoxazole-trimethoprim (BACTRIM DS;SEPTRA DS) 800-160 MG per tablet 0.5 tablet  0.5 tablet Oral Once per day on  April Deutsch MD        calcium-vitamin D (OSCAL-500) 500-5 MG-MCG per tablet 1 tablet  1 tablet Oral Daily April Deutsch MD        HYDROcodone-acetaminophen (NORCO) 5-325 MG per tablet 1 tablet  1 tablet Oral Q4H PRN Yonny Govea MD   1 tablet at 24 0931    ipratropium 0.5 mg-albuterol 2.5 mg (DUONEB) nebulizer solution 1 Dose  1 Dose Inhalation 4x Daily RT Liban Durand MD   1 Dose at 24 0708    methylPREDNISolone sodium (SOLU-MEDROL) 1,000 mg in sodium chloride 0.9 % 250 mL IVPB  1,000 mg IntraVENous Daily April Deutsch MD   Stopped at 24 1126    perflutren lipid microspheres (DEFINITY) injection 1.5 mL  1.5 mL IntraVENous ONCE PRN Black, Lisa, DO        pantoprazole (PROTONIX) 40 mg in sodium chloride (PF) 0.9 % 10 mL injection  40 mg IntraVENous Daily Black, Lisa, DO   40 mg at 24 0931    piperacillin-tazobactam (ZOSYN) 3,375 mg in sodium chloride 0.9 % 100 mL IVPB (mini-bag)

## 2024-12-17 NOTE — CONSULTS
Palliative Care Chart Review  and Check in Note:     NAME:  Emily Alegre  Admit Date: 12/10/2024  Hospital Day:  Hospital Day: 8   Current Code status: Full Code    Palliative care is continuing to following Ms. Alegre for symptom management,  and goals of care discussion as needed. Patient's chart reviewed today 12/17/24.        The following are the currently established goals/code status, and Symptom management.     Goals of care: Writer met with pt and pt's niece,Suzanna and brother,Rylan at bedside for continued goals of care discussion. Pt would like to move forward with HD and other treatment options at this time and not ready for palliative or hospice services,however, agreeable for HTP to follow. Referral called to Kirsten with [facility].    Code status: Full Code    Discharge plan: Lexie rivera financial is meeting with pt and family this AM to apply for medicaid.     SHIRLEY Blanca following for LTC placement and new HD slot.      Eli Lea RN  12/17/24  10:04 AM

## 2024-12-17 NOTE — FLOWSHEET NOTE
12/17/24 0719   Vital Signs   Temp 98.2 °F (36.8 °C)   Temp Source Oral   Pulse 86   Heart Rate Source Monitor   Respirations 18   BP (!) 141/78   MAP (Calculated) 99   BP Location Left Upper Arm   BP Method Automatic   Patient Position Semi fowlers   Oxygen Therapy   SpO2 95 %   O2 Device High flow nasal cannula   O2 Flow Rate (L/min) 5 L/min     Shift assessment completed. See flow sheet. Medications given. Patient is A&O x4. Vitals are stable. Patient titrated to 3L from 5L. Unable to tolerate 3L. Titrated to 4L and stable on 4L. 92%. Patient denies further needs. Call light within reach.

## 2024-12-17 NOTE — PROCEDURES
PROCEDURE: Surveillance, diagnostic, bronchoscopy with BAL     The risks and benefits as well as alternatives to the procedure have been discussed with the patient and or family.  The patient and or next of kin understands and agrees to proceed.     DESCRIPTION OF PROCEDURE: A time out was taken.   Type of sedation used: Moderate Sedation:   Conscious sedation with 3 mg versed and 75 mcg fentanyl.    Patient was monitored continuously 1:1 throughout the entire procedure while sedation was administered      The scope was passed with ease via the mouth.  A complete airway inspection was performed.  Mobile endotracheal region at the site of prior tracheostomy.  Scattered old bloody secretions at the main carmelina.  Patent rest of the airways.  Washings were obtained throughout the airways.  Sequential BALs was obtained from the RUL with nonbloody return.  First BAL had some old blood clots in it.  Endotracheal biopsies obtained from the endotracheal lesion then subsequently the whole lesion was removed using 16 mm airway retrieval basket after lesion fell into the airways.    The patient tolerated the procedure well. Estimated blood loss was less than 5 ml.   Recovery will be per endoscopy protocol.    FOLLOW UP:  Cultures and cytology in  three to five days.

## 2024-12-18 LAB
ACID FAST STN SPEC QL: NORMAL
ACID FAST STN SPEC QL: NORMAL
ALBUMIN SERPL-MCNC: 2.8 G/DL (ref 3.4–5)
ANION GAP SERPL CALCULATED.3IONS-SCNC: 14 MMOL/L (ref 3–16)
BASOPHILS # BLD: 0 K/UL (ref 0–0.2)
BASOPHILS NFR BLD: 0.1 %
BM IGG SER IF-ACNC: 0 AU/ML (ref 0–19)
BUN SERPL-MCNC: 63 MG/DL (ref 7–20)
CALCIUM SERPL-MCNC: 8.1 MG/DL (ref 8.3–10.6)
CHLORIDE SERPL-SCNC: 94 MMOL/L (ref 99–110)
CO2 SERPL-SCNC: 23 MMOL/L (ref 21–32)
CREAT SERPL-MCNC: 3.5 MG/DL (ref 0.6–1.2)
DEPRECATED RDW RBC AUTO: 16.4 % (ref 12.4–15.4)
EOSINOPHIL # BLD: 0 K/UL (ref 0–0.6)
EOSINOPHIL NFR BLD: 0 %
GFR SERPLBLD CREATININE-BSD FMLA CKD-EPI: 14 ML/MIN/{1.73_M2}
GLUCOSE SERPL-MCNC: 114 MG/DL (ref 70–99)
HBV CORE AB SERPL QL IA: NEGATIVE
HCT VFR BLD AUTO: 24.7 % (ref 36–48)
HGB BLD-MCNC: 8.2 G/DL (ref 12–16)
INR PPP: 1.11 (ref 0.85–1.15)
LYMPHOCYTES # BLD: 0.2 K/UL (ref 1–5.1)
LYMPHOCYTES NFR BLD: 3.4 %
MCH RBC QN AUTO: 29.3 PG (ref 26–34)
MCHC RBC AUTO-ENTMCNC: 33.2 G/DL (ref 31–36)
MCV RBC AUTO: 88.1 FL (ref 80–100)
MONOCYTES # BLD: 0.1 K/UL (ref 0–1.3)
MONOCYTES NFR BLD: 2.5 %
NEUTROPHILS # BLD: 4.9 K/UL (ref 1.7–7.7)
NEUTROPHILS NFR BLD: 94 %
PHOSPHATE SERPL-MCNC: 5.4 MG/DL (ref 2.5–4.9)
PLATELET # BLD AUTO: 101 K/UL (ref 135–450)
PMV BLD AUTO: 8 FL (ref 5–10.5)
POTASSIUM SERPL-SCNC: 5.1 MMOL/L (ref 3.5–5.1)
PROTHROMBIN TIME: 14.5 SEC (ref 11.9–14.9)
RBC # BLD AUTO: 2.81 M/UL (ref 4–5.2)
SODIUM SERPL-SCNC: 131 MMOL/L (ref 136–145)
WBC # BLD AUTO: 5.2 K/UL (ref 4–11)

## 2024-12-18 PROCEDURE — 2580000003 HC RX 258: Performed by: INTERNAL MEDICINE

## 2024-12-18 PROCEDURE — 97530 THERAPEUTIC ACTIVITIES: CPT

## 2024-12-18 PROCEDURE — 80069 RENAL FUNCTION PANEL: CPT

## 2024-12-18 PROCEDURE — 85610 PROTHROMBIN TIME: CPT

## 2024-12-18 PROCEDURE — 6360000002 HC RX W HCPCS: Performed by: INTERNAL MEDICINE

## 2024-12-18 PROCEDURE — 2500000003 HC RX 250 WO HCPCS: Performed by: INTERNAL MEDICINE

## 2024-12-18 PROCEDURE — 2700000000 HC OXYGEN THERAPY PER DAY

## 2024-12-18 PROCEDURE — 6370000000 HC RX 637 (ALT 250 FOR IP): Performed by: INTERNAL MEDICINE

## 2024-12-18 PROCEDURE — 99233 SBSQ HOSP IP/OBS HIGH 50: CPT | Performed by: INTERNAL MEDICINE

## 2024-12-18 PROCEDURE — 94640 AIRWAY INHALATION TREATMENT: CPT

## 2024-12-18 PROCEDURE — 36415 COLL VENOUS BLD VENIPUNCTURE: CPT

## 2024-12-18 PROCEDURE — 94761 N-INVAS EAR/PLS OXIMETRY MLT: CPT

## 2024-12-18 PROCEDURE — 90935 HEMODIALYSIS ONE EVALUATION: CPT

## 2024-12-18 PROCEDURE — 85025 COMPLETE CBC W/AUTO DIFF WBC: CPT

## 2024-12-18 PROCEDURE — 2060000000 HC ICU INTERMEDIATE R&B

## 2024-12-18 RX ORDER — SENNOSIDES A AND B 8.6 MG/1
1 TABLET, FILM COATED ORAL NIGHTLY
Status: DISCONTINUED | OUTPATIENT
Start: 2024-12-18 | End: 2024-12-24 | Stop reason: HOSPADM

## 2024-12-18 RX ORDER — PREDNISONE 20 MG/1
60 TABLET ORAL DAILY
Status: DISCONTINUED | OUTPATIENT
Start: 2024-12-18 | End: 2024-12-24 | Stop reason: HOSPADM

## 2024-12-18 RX ADMIN — IPRATROPIUM BROMIDE AND ALBUTEROL SULFATE 1 DOSE: 2.5; .5 SOLUTION RESPIRATORY (INHALATION) at 11:11

## 2024-12-18 RX ADMIN — PREDNISONE 60 MG: 20 TABLET ORAL at 17:27

## 2024-12-18 RX ADMIN — PIPERACILLIN AND TAZOBACTAM 3375 MG: 3; .375 INJECTION, POWDER, LYOPHILIZED, FOR SOLUTION INTRAVENOUS at 12:30

## 2024-12-18 RX ADMIN — IPRATROPIUM BROMIDE AND ALBUTEROL SULFATE 1 DOSE: 2.5; .5 SOLUTION RESPIRATORY (INHALATION) at 19:44

## 2024-12-18 RX ADMIN — IPRATROPIUM BROMIDE AND ALBUTEROL SULFATE 1 DOSE: 2.5; .5 SOLUTION RESPIRATORY (INHALATION) at 15:12

## 2024-12-18 RX ADMIN — HYDROCODONE BITARTRATE AND ACETAMINOPHEN 1 TABLET: 5; 325 TABLET ORAL at 11:32

## 2024-12-18 RX ADMIN — HYDROCODONE BITARTRATE AND ACETAMINOPHEN 1 TABLET: 5; 325 TABLET ORAL at 15:28

## 2024-12-18 RX ADMIN — SENNOSIDES 8.6 MG: 8.6 TABLET, FILM COATED ORAL at 21:05

## 2024-12-18 RX ADMIN — HYDROCODONE BITARTRATE AND ACETAMINOPHEN 1 TABLET: 5; 325 TABLET ORAL at 04:21

## 2024-12-18 RX ADMIN — HEPARIN SODIUM 2400 UNITS: 1000 INJECTION INTRAVENOUS; SUBCUTANEOUS at 10:45

## 2024-12-18 RX ADMIN — SODIUM CHLORIDE, PRESERVATIVE FREE 10 ML: 5 INJECTION INTRAVENOUS at 21:05

## 2024-12-18 RX ADMIN — POLYETHYLENE GLYCOL (3350) 17 G: 17 POWDER, FOR SOLUTION ORAL at 21:05

## 2024-12-18 RX ADMIN — HYDROCODONE BITARTRATE AND ACETAMINOPHEN 1 TABLET: 5; 325 TABLET ORAL at 21:05

## 2024-12-18 RX ADMIN — SODIUM CHLORIDE, PRESERVATIVE FREE 10 ML: 5 INJECTION INTRAVENOUS at 12:00

## 2024-12-18 RX ADMIN — BUSPIRONE HYDROCHLORIDE 10 MG: 10 TABLET ORAL at 21:05

## 2024-12-18 RX ADMIN — METHYLPREDNISOLONE SODIUM SUCCINATE 1000 MG: 1 INJECTION INTRAMUSCULAR; INTRAVENOUS at 11:26

## 2024-12-18 RX ADMIN — PIPERACILLIN AND TAZOBACTAM 3375 MG: 3; .375 INJECTION, POWDER, LYOPHILIZED, FOR SOLUTION INTRAVENOUS at 23:38

## 2024-12-18 ASSESSMENT — PAIN SCALES - GENERAL
PAINLEVEL_OUTOF10: 9
PAINLEVEL_OUTOF10: 9
PAINLEVEL_OUTOF10: 8
PAINLEVEL_OUTOF10: 9

## 2024-12-18 ASSESSMENT — PAIN DESCRIPTION - LOCATION
LOCATION: GENERALIZED;HAND
LOCATION: GENERALIZED
LOCATION: GENERALIZED

## 2024-12-18 ASSESSMENT — PAIN - FUNCTIONAL ASSESSMENT: PAIN_FUNCTIONAL_ASSESSMENT: PREVENTS OR INTERFERES SOME ACTIVE ACTIVITIES AND ADLS

## 2024-12-18 ASSESSMENT — PAIN DESCRIPTION - DESCRIPTORS: DESCRIPTORS: ACHING

## 2024-12-18 NOTE — PLAN OF CARE
Problem: Discharge Planning  Goal: Discharge to home or other facility with appropriate resources  Outcome: Progressing     Problem: Pain  Goal: Verbalizes/displays adequate comfort level or baseline comfort level  Outcome: Progressing  Flowsheets (Taken 12/18/2024 0346 by Kathie Hawthorne RN)  Verbalizes/displays adequate comfort level or baseline comfort level: Encourage patient to monitor pain and request assistance     Problem: Safety - Adult  Goal: Free from fall injury  Outcome: Progressing     Problem: ABCDS Injury Assessment  Goal: Absence of physical injury  Outcome: Progressing     Problem: Cardiovascular - Adult  Goal: Maintains optimal cardiac output and hemodynamic stability  Outcome: Progressing     Problem: Genitourinary - Adult  Goal: Urinary catheter remains patent  Outcome: Progressing     Problem: Hematologic - Adult  Goal: Maintains hematologic stability  Outcome: Progressing     Problem: Musculoskeletal - Adult  Goal: Return mobility to safest level of function  Outcome: Progressing     Problem: Nutrition Deficit:  Goal: Optimize nutritional status  Outcome: Progressing     Problem: Skin/Tissue Integrity  Goal: Absence of new skin breakdown  Description: 1.  Monitor for areas of redness and/or skin breakdown  2.  Assess vascular access sites hourly  3.  Every 4-6 hours minimum:  Change oxygen saturation probe site  4.  Every 4-6 hours:  If on nasal continuous positive airway pressure, respiratory therapy assess nares and determine need for appliance change or resting period.  Outcome: Progressing

## 2024-12-18 NOTE — FLOWSHEET NOTE
12/17/24 1330   Vital Signs   Temp 97.1 °F (36.2 °C)   Temp Source Oral   Pulse 66   Heart Rate Source Monitor   Respirations 14   BP (!) 142/81   MAP (Calculated) 101   BP Location Left Upper Arm   BP Method Automatic   Patient Position Semi fowlers   Pain Assessment   Pain Assessment 0-10   Pain Level 10   Pain Location Abdomen   Pain Orientation Mid   Pain Descriptors Sharp;Aching   Oxygen Therapy   SpO2 92 %   Pulse Oximetry Type Intermittent   Pulse Oximeter Device Mode Intermittent   Pulse Oximeter Device Location Left;Finger     Patient remains awake and alert and denies further needs. Call light within reach.

## 2024-12-18 NOTE — CARE COORDINATION
EDDIE Traylor/oncologist's office requesting a call back to see if they can assist with rescheduling PET scan.     PET scan was scheduled for 12/17/24 however the patient thought it was on Friday 12/20/24.

## 2024-12-18 NOTE — FLOWSHEET NOTE
12/18/24 0346   Vital Signs   Temp 97.8 °F (36.6 °C)   Temp Source Oral   Pulse 72   Heart Rate Source Monitor   Respirations 18   /86   MAP (Calculated) 104   BP Location Left Upper Arm   BP Method Automatic   Patient Position Semi fowlers   Pain Assessment   Pain Assessment None - Denies Pain   Care Plan - Pain Goals   Verbalizes/displays adequate comfort level or baseline comfort level Encourage patient to monitor pain and request assistance   Oxygen Therapy   SpO2 96 %   Pulse Oximetry Type Intermittent   Pulse Oximeter Device Mode Intermittent   Pulse Oximeter Device Location Right;Finger   O2 Device High flow nasal cannula   Skin Assessment Clean, dry, & intact   O2 Flow Rate (L/min) 4 L/min   Oxygen Therapy Supplemental oxygen

## 2024-12-18 NOTE — FLOWSHEET NOTE
12/17/24 1030   Vital Signs   Temp 97.7 °F (36.5 °C)   Temp Source Oral   Pulse 81   Heart Rate Source Monitor   Respirations 16   /68   MAP (Calculated) 88   BP Location Left Lower Arm   BP Method Automatic   Patient Position Semi fowlers   Oxygen Therapy   SpO2 92 %   O2 Device High flow nasal cannula   O2 Flow Rate (L/min) 5 L/min     Vitals remain stable. Patient denies further needs. Call light within reach.

## 2024-12-18 NOTE — PLAN OF CARE
Problem: Discharge Planning  Goal: Discharge to home or other facility with appropriate resources  Outcome: Progressing  Flowsheets  Taken 12/17/2024 1945 by Kathie Hawthorne RN  Discharge to home or other facility with appropriate resources: Identify barriers to discharge with patient and caregiver  Taken 12/17/2024 0958 by Jacqui Vasquez RN  Discharge to home or other facility with appropriate resources: Identify barriers to discharge with patient and caregiver     Problem: Pain  Goal: Verbalizes/displays adequate comfort level or baseline comfort level  Outcome: Progressing  Flowsheets (Taken 12/17/2024 1945)  Verbalizes/displays adequate comfort level or baseline comfort level: Encourage patient to monitor pain and request assistance     Problem: Safety - Adult  Goal: Free from fall injury  Outcome: Progressing     Problem: ABCDS Injury Assessment  Goal: Absence of physical injury  Outcome: Progressing     Problem: Cardiovascular - Adult  Goal: Maintains optimal cardiac output and hemodynamic stability  Outcome: Progressing  Flowsheets  Taken 12/17/2024 1945 by Kathie Hawthorne RN  Maintains optimal cardiac output and hemodynamic stability: Monitor blood pressure and heart rate  Taken 12/17/2024 0958 by Jacqui Vasquez RN  Maintains optimal cardiac output and hemodynamic stability: Monitor blood pressure and heart rate     Problem: Genitourinary - Adult  Goal: Urinary catheter remains patent  Outcome: Progressing  Flowsheets (Taken 12/17/2024 0958 by Jacqui Vasquez RN)  Urinary catheter remains patent: Assess patency of urinary catheter     Problem: Hematologic - Adult  Goal: Maintains hematologic stability  Outcome: Progressing  Flowsheets  Taken 12/17/2024 1945 by Kathie Hawthorne RN  Maintains hematologic stability: Assess for signs and symptoms of bleeding or hemorrhage  Taken 12/17/2024 0958 by Jacqui Vasquez RN  Maintains hematologic stability: Assess for signs and

## 2024-12-18 NOTE — ACP (ADVANCE CARE PLANNING)
Advance Care Planning     Advance Care Planning Inpatient Note  [facility] Department    Today's Date: 12/18/2024  Unit: [facility] PCU TELEMETRY    Received request from IDT Member.  Upon review of chart and communication with care team, patient's decision making abilities are not in question.. Patient and Child/Children was/were present in the room during visit.    Goals of ACP Conversation:  Discuss advance care planning documents    Health Care Decision Makers:       Primary Decision Maker: Damian Alegre - Child - [phone]    Secondary Decision Maker: Suzanna Alas - Niece/Nephew - [phone]  Summary:  Completed New Documents  Verified Healthcare Decision Maker  Updated Healthcare Decision Maker  Advance Care Planning Documents (Patient Wishes):  Healthcare Power of /Advance Directive Appointment of Health Care Agent     Assessment:  Patient has no living spouse.  She has one adult son (no other children).  Patient has discussed her wishes today.  She states she does not want resuscitation attempts if there is not hope of meaningful recovery.  She would not want to be kept alive on machine.  Interventions:  Provided education on documents for clarity and greater understanding  Discussed and provided education on state decision maker hierarchy  Assisted in the completion of documents according to patient's wishes at this time  Encouraged ongoing ACP conversation with future decision makers and loved ones    Care Preferences Communicated:   Ventilation:   If the patient, in their present state of health, suddenly became very ill and unable to breathe on their own,     the patient would desire the use of a ventilator (breathing machine).    If their health worsens and it becomes clear that the change of recovery is unlikely,     the patient would NOT desire the use of a ventilator (breathing machine).    Resuscitation:  In the event the patient's heart stopped as a result of an underlying serious

## 2024-12-18 NOTE — FLOWSHEET NOTE
12/18/24 0724 12/18/24 1028   Vital Signs   BP (!) 157/87 (!) 148/83   Temp 97.8 °F (36.6 °C) 97.5 °F (36.4 °C)   Pulse 73 84   Respirations 18 16       Treatment time: 3 hours  Net UF: 1.5 L    Pre weight: 71.3 kg (bed scale)  Post weight: 69.8 kg (bed scale)  EDW: TBD/FELISA    Access used: NT RIJ   Access function: No problems    Medications or blood products given: none    Regular outpatient schedule: TBD/FELISA    Summary of response to treatment: Tolerated 3 hour HD tx without difficulty.  Net UF 1.5 L.  No meds given.  Melissa signs stable throughout tx.      Crit line: H1=24.2, H2=24, difference of .2.  No refill present.  Ending profile A.    Copy of dialysis treatment record placed in chart, to be scanned into EMR.    Report called to Haleigh Dorantes RN.

## 2024-12-18 NOTE — FLOWSHEET NOTE
12/17/24 2328   Vital Signs   Temp 97.6 °F (36.4 °C)   Temp Source Axillary   Pulse 65   Heart Rate Source Monitor   Respirations 18   BP (!) 140/88   MAP (Calculated) 105   BP Location Left Upper Arm   BP Method Automatic   Patient Position Semi fowlers   Oxygen Therapy   SpO2 95 %   O2 Device High flow nasal cannula   O2 Flow Rate (L/min) 4 L/min

## 2024-12-18 NOTE — FLOWSHEET NOTE
12/17/24 1945   Vital Signs   Temp 97.9 °F (36.6 °C)   Temp Source Oral   Pulse 77   Heart Rate Source Monitor   Respirations 16   BP (!) 137/92   MAP (Calculated) 107   BP Location Left Upper Arm   BP Method Automatic   Patient Position Semi fowlers   Pain Assessment   Pain Assessment None - Denies Pain   Care Plan - Pain Goals   Verbalizes/displays adequate comfort level or baseline comfort level Encourage patient to monitor pain and request assistance   Opioid-Induced Sedation   POSS Score S   RASS   Cotto Agitation Sedation Scale (RASS) -1   Oxygen Therapy   SpO2 97 %   Pulse Oximetry Type Intermittent   Pulse Oximeter Device Mode Intermittent   Pulse Oximeter Device Location Right;Finger   O2 Device High flow nasal cannula   Skin Assessment Clean, dry, & intact   O2 Flow Rate (L/min) 4 L/min   Oxygen Therapy Supplemental oxygen     Pt resting comfortably in bed, sleeping when not engaged in conversation. Bed alarm on, call light within reach. No needs expressed at this time. Will continue to monitor.

## 2024-12-18 NOTE — FLOWSHEET NOTE
12/18/24 1045   Vital Signs   Temp 97.5 °F (36.4 °C)   Temp Source Axillary   Pulse 85   Heart Rate Source Monitor   Respirations 18   /85   MAP (Calculated) 101   BP Location Left Upper Arm   BP Method Automatic     Pt return from dialysis plan of care discussed at bedside

## 2024-12-18 NOTE — CARE COORDINATION
10:50 - Spoke to son regarding plan for patient. CM asking the son if he will be able to transport the patient to dialysis if she is discharged to home (after going to therapy).     The son stated that he does not think he could transport the patient to dialysis but it would depend on the schedule. CM advised of the 4 possible schedules that are offered. The son stated he was told yesterday that the patient will remain in the hospital for at least two weeks for her medical condition. CM advised the son that CM will speak to the provider and get back to him.    Per Dr. Gould the patient is not staying here for two weeks. CM to find a place for patient to receive HD and therapy.    Spoke with patient who is agreeable to [facility] for therapy and HD.    Spoke to Wilma/[facility]. Faxed referral. Per Wilma patient to be discharged on Friday and that will open up a bed.    Received call from Wilma/[facility] advising they need to know about the following:    IV Zosyn - unsure they can get the cost down to cover IV Zosyn Q12 hours  Was the patient at [place] previously  Will patient be getting chemo therapy in the future    Spoke to [facility] to inquire if the patient has a PET scan scheduled. CM was told there is no PET scan scheduled for [facility] and also nothing showing for [facility].     Per chart review the patient was to have a PET scan on 12/17 at [facility]. CM will reach out to Kymberly Eaton/ at oncologist's office to see if this can be rescheduled.

## 2024-12-19 ENCOUNTER — ANESTHESIA EVENT (OUTPATIENT)
Dept: CT IMAGING | Age: 69
End: 2024-12-19
Payer: MEDICARE

## 2024-12-19 LAB
ADENOVIRUS PCR: NOT DETECTED
ADENOVIRUS PCR: NOT DETECTED
ALBUMIN SERPL-MCNC: 2.7 G/DL (ref 3.4–5)
ANCA IFA PATTERN: NORMAL
ANCA IFA TITER: NORMAL
ANION GAP SERPL CALCULATED.3IONS-SCNC: 13 MMOL/L (ref 3–16)
BACTERIA SPEC RESP CULT: NORMAL
BUN SERPL-MCNC: 42 MG/DL (ref 7–20)
CALCIUM SERPL-MCNC: 7.7 MG/DL (ref 8.3–10.6)
CHLORIDE SERPL-SCNC: 95 MMOL/L (ref 99–110)
CO2 SERPL-SCNC: 24 MMOL/L (ref 21–32)
CREAT SERPL-MCNC: 2.6 MG/DL (ref 0.6–1.2)
DEPRECATED RDW RBC AUTO: 16.7 % (ref 12.4–15.4)
GFR SERPLBLD CREATININE-BSD FMLA CKD-EPI: 19 ML/MIN/{1.73_M2}
GLUCOSE SERPL-MCNC: 105 MG/DL (ref 70–99)
GRAM STN SPEC: NORMAL
HCT VFR BLD AUTO: 22.1 % (ref 36–48)
HGB BLD-MCNC: 7.4 G/DL (ref 12–16)
HUMAN METAPNEUMOVIRUS PCR: NOT DETECTED
HUMAN METAPNEUMOVIRUS PCR: NOT DETECTED
INFLUENZA A: NOT DETECTED
INFLUENZA A: NOT DETECTED
INFLUENZA B: NOT DETECTED
INFLUENZA B: NOT DETECTED
INR PPP: 1.13 (ref 0.85–1.15)
MCH RBC QN AUTO: 29.6 PG (ref 26–34)
MCHC RBC AUTO-ENTMCNC: 33.7 G/DL (ref 31–36)
MCV RBC AUTO: 87.8 FL (ref 80–100)
PARAINFLUENZA 1 PCR: NOT DETECTED
PARAINFLUENZA 1 PCR: NOT DETECTED
PARAINFLUENZA 2 PCR: NOT DETECTED
PARAINFLUENZA 2 PCR: NOT DETECTED
PARAINFLUENZA 3 PCR: NOT DETECTED
PARAINFLUENZA 3 PCR: NOT DETECTED
PARAINFLUENZA 4 PCR: NOT DETECTED
PARAINFLUENZA 4 PCR: NOT DETECTED
PHOSPHATE SERPL-MCNC: 4.4 MG/DL (ref 2.5–4.9)
PLATELET # BLD AUTO: 98 K/UL (ref 135–450)
PMV BLD AUTO: 7.8 FL (ref 5–10.5)
POTASSIUM SERPL-SCNC: 4 MMOL/L (ref 3.5–5.1)
PROTHROMBIN TIME: 14.7 SEC (ref 11.9–14.9)
RBC # BLD AUTO: 2.51 M/UL (ref 4–5.2)
RHINO/ENTEROVIRUS PCR: NOT DETECTED
RHINO/ENTEROVIRUS PCR: NOT DETECTED
RSV BY PCR: NOT DETECTED
RSV BY PCR: NOT DETECTED
RSV SOURCE: NORMAL
RSV SOURCE: NORMAL
SODIUM SERPL-SCNC: 132 MMOL/L (ref 136–145)
WBC # BLD AUTO: 3.8 K/UL (ref 4–11)

## 2024-12-19 PROCEDURE — 85610 PROTHROMBIN TIME: CPT

## 2024-12-19 PROCEDURE — 94761 N-INVAS EAR/PLS OXIMETRY MLT: CPT

## 2024-12-19 PROCEDURE — 6370000000 HC RX 637 (ALT 250 FOR IP): Performed by: INTERNAL MEDICINE

## 2024-12-19 PROCEDURE — 97535 SELF CARE MNGMENT TRAINING: CPT

## 2024-12-19 PROCEDURE — 2700000000 HC OXYGEN THERAPY PER DAY

## 2024-12-19 PROCEDURE — 97530 THERAPEUTIC ACTIVITIES: CPT

## 2024-12-19 PROCEDURE — 2500000003 HC RX 250 WO HCPCS: Performed by: INTERNAL MEDICINE

## 2024-12-19 PROCEDURE — 2580000003 HC RX 258: Performed by: INTERNAL MEDICINE

## 2024-12-19 PROCEDURE — 6360000002 HC RX W HCPCS: Performed by: INTERNAL MEDICINE

## 2024-12-19 PROCEDURE — 99232 SBSQ HOSP IP/OBS MODERATE 35: CPT | Performed by: INTERNAL MEDICINE

## 2024-12-19 PROCEDURE — 99233 SBSQ HOSP IP/OBS HIGH 50: CPT | Performed by: INTERNAL MEDICINE

## 2024-12-19 PROCEDURE — 2060000000 HC ICU INTERMEDIATE R&B

## 2024-12-19 PROCEDURE — 94640 AIRWAY INHALATION TREATMENT: CPT

## 2024-12-19 PROCEDURE — 36415 COLL VENOUS BLD VENIPUNCTURE: CPT

## 2024-12-19 PROCEDURE — 85027 COMPLETE CBC AUTOMATED: CPT

## 2024-12-19 PROCEDURE — 80069 RENAL FUNCTION PANEL: CPT

## 2024-12-19 RX ORDER — DIPHENHYDRAMINE HYDROCHLORIDE 50 MG/ML
25 INJECTION INTRAMUSCULAR; INTRAVENOUS ONCE
Status: COMPLETED | OUTPATIENT
Start: 2024-12-19 | End: 2024-12-19

## 2024-12-19 RX ORDER — DIPHENHYDRAMINE HCL 25 MG
25 TABLET ORAL ONCE
Status: COMPLETED | OUTPATIENT
Start: 2024-12-19 | End: 2024-12-19

## 2024-12-19 RX ORDER — ACETAMINOPHEN 325 MG/1
650 TABLET ORAL ONCE
Status: COMPLETED | OUTPATIENT
Start: 2024-12-19 | End: 2024-12-19

## 2024-12-19 RX ADMIN — MIDODRINE HYDROCHLORIDE 10 MG: 10 TABLET ORAL at 12:16

## 2024-12-19 RX ADMIN — ACETAMINOPHEN 650 MG: 325 TABLET ORAL at 11:04

## 2024-12-19 RX ADMIN — PANTOPRAZOLE SODIUM 40 MG: 40 INJECTION, POWDER, FOR SOLUTION INTRAVENOUS at 08:43

## 2024-12-19 RX ADMIN — BUSPIRONE HYDROCHLORIDE 10 MG: 10 TABLET ORAL at 08:43

## 2024-12-19 RX ADMIN — SODIUM CHLORIDE, PRESERVATIVE FREE 10 ML: 5 INJECTION INTRAVENOUS at 21:09

## 2024-12-19 RX ADMIN — IPRATROPIUM BROMIDE AND ALBUTEROL SULFATE 1 DOSE: 2.5; .5 SOLUTION RESPIRATORY (INHALATION) at 07:09

## 2024-12-19 RX ADMIN — HYDROCODONE BITARTRATE AND ACETAMINOPHEN 1 TABLET: 5; 325 TABLET ORAL at 16:38

## 2024-12-19 RX ADMIN — POLYETHYLENE GLYCOL (3350) 17 G: 17 POWDER, FOR SOLUTION ORAL at 21:08

## 2024-12-19 RX ADMIN — PIPERACILLIN AND TAZOBACTAM 3375 MG: 3; .375 INJECTION, POWDER, LYOPHILIZED, FOR SOLUTION INTRAVENOUS at 12:16

## 2024-12-19 RX ADMIN — MIDODRINE HYDROCHLORIDE 10 MG: 10 TABLET ORAL at 08:43

## 2024-12-19 RX ADMIN — POLYETHYLENE GLYCOL (3350) 17 G: 17 POWDER, FOR SOLUTION ORAL at 08:43

## 2024-12-19 RX ADMIN — PREDNISONE 60 MG: 20 TABLET ORAL at 08:43

## 2024-12-19 RX ADMIN — HYDROCODONE BITARTRATE AND ACETAMINOPHEN 1 TABLET: 5; 325 TABLET ORAL at 08:43

## 2024-12-19 RX ADMIN — PIPERACILLIN AND TAZOBACTAM 3375 MG: 3; .375 INJECTION, POWDER, LYOPHILIZED, FOR SOLUTION INTRAVENOUS at 23:36

## 2024-12-19 RX ADMIN — Medication 2 PUFF: at 11:10

## 2024-12-19 RX ADMIN — SENNOSIDES 8.6 MG: 8.6 TABLET, FILM COATED ORAL at 21:08

## 2024-12-19 RX ADMIN — DIPHENHYDRAMINE HCL 25 MG: 25 TABLET ORAL at 11:04

## 2024-12-19 RX ADMIN — FOLIC ACID 1 MG: 1 TABLET ORAL at 08:43

## 2024-12-19 RX ADMIN — BUSPIRONE HYDROCHLORIDE 10 MG: 10 TABLET ORAL at 21:08

## 2024-12-19 RX ADMIN — SODIUM CHLORIDE, PRESERVATIVE FREE 5 ML: 5 INJECTION INTRAVENOUS at 10:55

## 2024-12-19 RX ADMIN — HYDROCODONE BITARTRATE AND ACETAMINOPHEN 1 TABLET: 5; 325 TABLET ORAL at 03:04

## 2024-12-19 RX ADMIN — Medication 1 TABLET: at 08:43

## 2024-12-19 RX ADMIN — RITUXIMAB 1000 MG: 10 INJECTION, SOLUTION INTRAVENOUS at 12:23

## 2024-12-19 RX ADMIN — IPRATROPIUM BROMIDE AND ALBUTEROL SULFATE 1 DOSE: 2.5; .5 SOLUTION RESPIRATORY (INHALATION) at 20:26

## 2024-12-19 ASSESSMENT — PAIN DESCRIPTION - LOCATION
LOCATION: HAND;GENERALIZED
LOCATION: GENERALIZED
LOCATION: HAND;GENERALIZED
LOCATION: GENERALIZED

## 2024-12-19 ASSESSMENT — PAIN SCALES - GENERAL
PAINLEVEL_OUTOF10: 9
PAINLEVEL_OUTOF10: 9
PAINLEVEL_OUTOF10: 4
PAINLEVEL_OUTOF10: 9
PAINLEVEL_OUTOF10: 9

## 2024-12-19 ASSESSMENT — PAIN - FUNCTIONAL ASSESSMENT
PAIN_FUNCTIONAL_ASSESSMENT: ACTIVITIES ARE NOT PREVENTED
PAIN_FUNCTIONAL_ASSESSMENT: ACTIVITIES ARE NOT PREVENTED

## 2024-12-19 ASSESSMENT — PAIN DESCRIPTION - ONSET
ONSET: ON-GOING
ONSET: ON-GOING

## 2024-12-19 ASSESSMENT — PAIN DESCRIPTION - PAIN TYPE
TYPE: CHRONIC PAIN
TYPE: CHRONIC PAIN

## 2024-12-19 ASSESSMENT — PAIN DESCRIPTION - DESCRIPTORS
DESCRIPTORS: ACHING
DESCRIPTORS: ACHING

## 2024-12-19 ASSESSMENT — PAIN SCALES - WONG BAKER: WONGBAKER_NUMERICALRESPONSE: NO HURT

## 2024-12-19 ASSESSMENT — PAIN DESCRIPTION - FREQUENCY
FREQUENCY: CONTINUOUS
FREQUENCY: CONTINUOUS

## 2024-12-19 ASSESSMENT — PAIN DESCRIPTION - ORIENTATION
ORIENTATION: LEFT;RIGHT
ORIENTATION: LEFT;RIGHT

## 2024-12-19 NOTE — FLOWSHEET NOTE
12/18/24 1930   Vital Signs   Temp 97.4 °F (36.3 °C)   Temp Source Axillary   Pulse 80   Heart Rate Source Monitor   Respirations 18   BP (!) 142/82   MAP (Calculated) 102   BP Location Right upper arm   BP Method Automatic   Patient Position Semi fowlers   Oxygen Therapy   SpO2 96 %   O2 Device High flow nasal cannula   O2 Flow Rate (L/min) 4 L/min

## 2024-12-19 NOTE — CARE COORDINATION
Patient to have renal biopsy tomorrow.     Will be on Rituxan - one dose today and one dose in two weeks.     3:43 - Received call from Wilma/[facility]. Wilma reports she did submit patient for [facility] in the portal, however Wilma is not sure there will be a bed open. The patient that is leaving [facility] may remain on [facility] at the [place] location.    Discussed the patient's Rituxan with Wilma. CM unsure if the second dose in two weeks will be the last dose or if this will be ongoing. CM will ask Dr. El in the morning. Wilma will research the medication to see if this is something they can even get.

## 2024-12-20 ENCOUNTER — ANESTHESIA (OUTPATIENT)
Dept: CT IMAGING | Age: 69
End: 2024-12-20
Payer: MEDICARE

## 2024-12-20 ENCOUNTER — APPOINTMENT (OUTPATIENT)
Dept: CT IMAGING | Age: 69
DRG: 640 | End: 2024-12-20
Attending: INTERNAL MEDICINE
Payer: MEDICARE

## 2024-12-20 LAB
ALBUMIN SERPL-MCNC: 2.8 G/DL (ref 3.4–5)
ANION GAP SERPL CALCULATED.3IONS-SCNC: 15 MMOL/L (ref 3–16)
BLOOD BANK DISPENSE STATUS: NORMAL
BLOOD BANK DISPENSE STATUS: NORMAL
BLOOD BANK PRODUCT CODE: NORMAL
BLOOD BANK PRODUCT CODE: NORMAL
BPU ID: NORMAL
BPU ID: NORMAL
BUN SERPL-MCNC: 64 MG/DL (ref 7–20)
CALCIUM SERPL-MCNC: 7.8 MG/DL (ref 8.3–10.6)
CHLORIDE SERPL-SCNC: 95 MMOL/L (ref 99–110)
CO2 SERPL-SCNC: 23 MMOL/L (ref 21–32)
CREAT SERPL-MCNC: 3.3 MG/DL (ref 0.6–1.2)
DEPRECATED RDW RBC AUTO: 16.9 % (ref 12.4–15.4)
DESCRIPTION BLOOD BANK: NORMAL
DESCRIPTION BLOOD BANK: NORMAL
FUNGUS SPEC CULT: ABNORMAL
GFR SERPLBLD CREATININE-BSD FMLA CKD-EPI: 15 ML/MIN/{1.73_M2}
GLUCOSE BLD-MCNC: 136 MG/DL (ref 70–99)
GLUCOSE SERPL-MCNC: 84 MG/DL (ref 70–99)
HCT VFR BLD AUTO: 21.1 % (ref 36–48)
HCT VFR BLD AUTO: 22 % (ref 36–48)
HGB BLD-MCNC: 7 G/DL (ref 12–16)
HGB BLD-MCNC: 7.3 G/DL (ref 12–16)
INR PPP: 1.15 (ref 0.85–1.15)
LOEFFLER MB STN SPEC: ABNORMAL
MCH RBC QN AUTO: 29.1 PG (ref 26–34)
MCHC RBC AUTO-ENTMCNC: 33.1 G/DL (ref 31–36)
MCV RBC AUTO: 87.8 FL (ref 80–100)
ORGANISM: ABNORMAL
PERFORMED ON: ABNORMAL
PHOSPHATE SERPL-MCNC: 4.8 MG/DL (ref 2.5–4.9)
PLATELET # BLD AUTO: 127 K/UL (ref 135–450)
PMV BLD AUTO: 7.6 FL (ref 5–10.5)
POTASSIUM SERPL-SCNC: 4 MMOL/L (ref 3.5–5.1)
PRELIMINARY: NORMAL
PROTHROMBIN TIME: 14.9 SEC (ref 11.9–14.9)
RBC # BLD AUTO: 2.41 M/UL (ref 4–5.2)
SODIUM SERPL-SCNC: 133 MMOL/L (ref 136–145)
WBC # BLD AUTO: 6.7 K/UL (ref 4–11)

## 2024-12-20 PROCEDURE — 6370000000 HC RX 637 (ALT 250 FOR IP): Performed by: INTERNAL MEDICINE

## 2024-12-20 PROCEDURE — 2700000000 HC OXYGEN THERAPY PER DAY

## 2024-12-20 PROCEDURE — 99233 SBSQ HOSP IP/OBS HIGH 50: CPT | Performed by: INTERNAL MEDICINE

## 2024-12-20 PROCEDURE — 2500000003 HC RX 250 WO HCPCS: Performed by: INTERNAL MEDICINE

## 2024-12-20 PROCEDURE — 3700000000 HC ANESTHESIA ATTENDED CARE: Performed by: STUDENT IN AN ORGANIZED HEALTH CARE EDUCATION/TRAINING PROGRAM

## 2024-12-20 PROCEDURE — 85014 HEMATOCRIT: CPT

## 2024-12-20 PROCEDURE — 6360000002 HC RX W HCPCS: Performed by: INTERNAL MEDICINE

## 2024-12-20 PROCEDURE — 94761 N-INVAS EAR/PLS OXIMETRY MLT: CPT

## 2024-12-20 PROCEDURE — 80069 RENAL FUNCTION PANEL: CPT

## 2024-12-20 PROCEDURE — 7100000001 HC PACU RECOVERY - ADDTL 15 MIN: Performed by: STUDENT IN AN ORGANIZED HEALTH CARE EDUCATION/TRAINING PROGRAM

## 2024-12-20 PROCEDURE — P9047 ALBUMIN (HUMAN), 25%, 50ML: HCPCS | Performed by: INTERNAL MEDICINE

## 2024-12-20 PROCEDURE — 85027 COMPLETE CBC AUTOMATED: CPT

## 2024-12-20 PROCEDURE — 36415 COLL VENOUS BLD VENIPUNCTURE: CPT

## 2024-12-20 PROCEDURE — 0TB03ZX EXCISION OF RIGHT KIDNEY, PERCUTANEOUS APPROACH, DIAGNOSTIC: ICD-10-PCS | Performed by: STUDENT IN AN ORGANIZED HEALTH CARE EDUCATION/TRAINING PROGRAM

## 2024-12-20 PROCEDURE — 85018 HEMOGLOBIN: CPT

## 2024-12-20 PROCEDURE — 2709999900 CT BIOPSY RENAL

## 2024-12-20 PROCEDURE — 90935 HEMODIALYSIS ONE EVALUATION: CPT

## 2024-12-20 PROCEDURE — 94640 AIRWAY INHALATION TREATMENT: CPT

## 2024-12-20 PROCEDURE — 36430 TRANSFUSION BLD/BLD COMPNT: CPT

## 2024-12-20 PROCEDURE — 6360000002 HC RX W HCPCS: Performed by: NURSE ANESTHETIST, CERTIFIED REGISTERED

## 2024-12-20 PROCEDURE — 2580000003 HC RX 258: Performed by: INTERNAL MEDICINE

## 2024-12-20 PROCEDURE — 3700000001 HC ADD 15 MINUTES (ANESTHESIA): Performed by: STUDENT IN AN ORGANIZED HEALTH CARE EDUCATION/TRAINING PROGRAM

## 2024-12-20 PROCEDURE — 6360000002 HC RX W HCPCS: Performed by: ANESTHESIOLOGY

## 2024-12-20 PROCEDURE — 7100000000 HC PACU RECOVERY - FIRST 15 MIN: Performed by: STUDENT IN AN ORGANIZED HEALTH CARE EDUCATION/TRAINING PROGRAM

## 2024-12-20 PROCEDURE — 2500000003 HC RX 250 WO HCPCS: Performed by: NURSE ANESTHETIST, CERTIFIED REGISTERED

## 2024-12-20 PROCEDURE — 2060000000 HC ICU INTERMEDIATE R&B

## 2024-12-20 PROCEDURE — 85610 PROTHROMBIN TIME: CPT

## 2024-12-20 RX ORDER — LIDOCAINE HYDROCHLORIDE 20 MG/ML
INJECTION, SOLUTION EPIDURAL; INFILTRATION; INTRACAUDAL; PERINEURAL
Status: DISCONTINUED | OUTPATIENT
Start: 2024-12-20 | End: 2024-12-20 | Stop reason: SDUPTHER

## 2024-12-20 RX ORDER — LABETALOL HYDROCHLORIDE 5 MG/ML
10 INJECTION, SOLUTION INTRAVENOUS
Status: DISCONTINUED | OUTPATIENT
Start: 2024-12-20 | End: 2024-12-23 | Stop reason: HOSPADM

## 2024-12-20 RX ORDER — ONDANSETRON 2 MG/ML
4 INJECTION INTRAMUSCULAR; INTRAVENOUS
Status: ACTIVE | OUTPATIENT
Start: 2024-12-20 | End: 2024-12-21

## 2024-12-20 RX ORDER — ACETAMINOPHEN 500 MG
500 TABLET ORAL ONCE
Status: COMPLETED | OUTPATIENT
Start: 2024-12-20 | End: 2024-12-20

## 2024-12-20 RX ORDER — ALBUMIN (HUMAN) 12.5 G/50ML
25 SOLUTION INTRAVENOUS PRN
Status: DISCONTINUED | OUTPATIENT
Start: 2024-12-20 | End: 2024-12-24 | Stop reason: HOSPADM

## 2024-12-20 RX ORDER — SODIUM CHLORIDE 0.9 % (FLUSH) 0.9 %
5-40 SYRINGE (ML) INJECTION PRN
Status: DISCONTINUED | OUTPATIENT
Start: 2024-12-20 | End: 2024-12-23 | Stop reason: HOSPADM

## 2024-12-20 RX ORDER — PROPOFOL 10 MG/ML
INJECTION, EMULSION INTRAVENOUS
Status: DISCONTINUED | OUTPATIENT
Start: 2024-12-20 | End: 2024-12-20 | Stop reason: SDUPTHER

## 2024-12-20 RX ORDER — ONDANSETRON 2 MG/ML
INJECTION INTRAMUSCULAR; INTRAVENOUS
Status: DISCONTINUED | OUTPATIENT
Start: 2024-12-20 | End: 2024-12-20 | Stop reason: SDUPTHER

## 2024-12-20 RX ORDER — SODIUM CHLORIDE 0.9 % (FLUSH) 0.9 %
5-40 SYRINGE (ML) INJECTION EVERY 12 HOURS SCHEDULED
Status: DISCONTINUED | OUTPATIENT
Start: 2024-12-20 | End: 2024-12-23 | Stop reason: HOSPADM

## 2024-12-20 RX ORDER — IPRATROPIUM BROMIDE AND ALBUTEROL SULFATE 2.5; .5 MG/3ML; MG/3ML
1 SOLUTION RESPIRATORY (INHALATION)
Status: DISCONTINUED | OUTPATIENT
Start: 2024-12-20 | End: 2024-12-24 | Stop reason: HOSPADM

## 2024-12-20 RX ORDER — DEXAMETHASONE SODIUM PHOSPHATE 4 MG/ML
INJECTION, SOLUTION INTRA-ARTICULAR; INTRALESIONAL; INTRAMUSCULAR; INTRAVENOUS; SOFT TISSUE
Status: DISCONTINUED | OUTPATIENT
Start: 2024-12-20 | End: 2024-12-20 | Stop reason: SDUPTHER

## 2024-12-20 RX ORDER — ROCURONIUM BROMIDE 10 MG/ML
INJECTION, SOLUTION INTRAVENOUS
Status: DISCONTINUED | OUTPATIENT
Start: 2024-12-20 | End: 2024-12-20 | Stop reason: SDUPTHER

## 2024-12-20 RX ORDER — DIPHENHYDRAMINE HYDROCHLORIDE 50 MG/ML
12.5 INJECTION INTRAMUSCULAR; INTRAVENOUS
Status: ACTIVE | OUTPATIENT
Start: 2024-12-20 | End: 2024-12-21

## 2024-12-20 RX ORDER — NALOXONE HYDROCHLORIDE 0.4 MG/ML
INJECTION, SOLUTION INTRAMUSCULAR; INTRAVENOUS; SUBCUTANEOUS PRN
Status: DISCONTINUED | OUTPATIENT
Start: 2024-12-20 | End: 2024-12-23 | Stop reason: HOSPADM

## 2024-12-20 RX ORDER — SODIUM CHLORIDE 9 MG/ML
INJECTION, SOLUTION INTRAVENOUS PRN
Status: DISCONTINUED | OUTPATIENT
Start: 2024-12-20 | End: 2024-12-23

## 2024-12-20 RX ORDER — METOPROLOL TARTRATE 1 MG/ML
5 INJECTION, SOLUTION INTRAVENOUS ONCE
Status: COMPLETED | OUTPATIENT
Start: 2024-12-20 | End: 2024-12-20

## 2024-12-20 RX ORDER — SODIUM CHLORIDE 9 MG/ML
INJECTION, SOLUTION INTRAVENOUS PRN
Status: DISCONTINUED | OUTPATIENT
Start: 2024-12-20 | End: 2024-12-21

## 2024-12-20 RX ADMIN — PIPERACILLIN AND TAZOBACTAM 3375 MG: 3; .375 INJECTION, POWDER, LYOPHILIZED, FOR SOLUTION INTRAVENOUS at 16:10

## 2024-12-20 RX ADMIN — Medication 1 TABLET: at 09:49

## 2024-12-20 RX ADMIN — DEXAMETHASONE SODIUM PHOSPHATE 4 MG: 4 INJECTION, SOLUTION INTRAMUSCULAR; INTRAVENOUS at 13:42

## 2024-12-20 RX ADMIN — ACETAMINOPHEN 500 MG: 500 TABLET ORAL at 17:13

## 2024-12-20 RX ADMIN — HYDROMORPHONE HYDROCHLORIDE 0.5 MG: 1 INJECTION, SOLUTION INTRAMUSCULAR; INTRAVENOUS; SUBCUTANEOUS at 15:10

## 2024-12-20 RX ADMIN — PROPOFOL 120 MG: 10 INJECTION, EMULSION INTRAVENOUS at 13:44

## 2024-12-20 RX ADMIN — ROCURONIUM BROMIDE 50 MG: 10 INJECTION, SOLUTION INTRAVENOUS at 13:44

## 2024-12-20 RX ADMIN — SODIUM CHLORIDE, PRESERVATIVE FREE 10 ML: 5 INJECTION INTRAVENOUS at 08:02

## 2024-12-20 RX ADMIN — PREDNISONE 60 MG: 20 TABLET ORAL at 09:48

## 2024-12-20 RX ADMIN — HYDROCODONE BITARTRATE AND ACETAMINOPHEN 1 TABLET: 5; 325 TABLET ORAL at 03:34

## 2024-12-20 RX ADMIN — LIDOCAINE HYDROCHLORIDE 80 MG: 20 INJECTION, SOLUTION EPIDURAL; INFILTRATION; INTRACAUDAL; PERINEURAL at 13:44

## 2024-12-20 RX ADMIN — SUGAMMADEX 200 MG: 100 INJECTION, SOLUTION INTRAVENOUS at 14:30

## 2024-12-20 RX ADMIN — HYDROCODONE BITARTRATE AND ACETAMINOPHEN 1 TABLET: 5; 325 TABLET ORAL at 16:14

## 2024-12-20 RX ADMIN — ONDANSETRON 4 MG: 2 INJECTION INTRAMUSCULAR; INTRAVENOUS at 13:42

## 2024-12-20 RX ADMIN — BUSPIRONE HYDROCHLORIDE 10 MG: 10 TABLET ORAL at 09:49

## 2024-12-20 RX ADMIN — METOPROLOL TARTRATE 5 MG: 1 INJECTION, SOLUTION INTRAVENOUS at 08:02

## 2024-12-20 RX ADMIN — HYDROMORPHONE HYDROCHLORIDE 0.5 MG: 1 INJECTION, SOLUTION INTRAMUSCULAR; INTRAVENOUS; SUBCUTANEOUS at 15:00

## 2024-12-20 RX ADMIN — HYDROCODONE BITARTRATE AND ACETAMINOPHEN 1 TABLET: 5; 325 TABLET ORAL at 20:48

## 2024-12-20 RX ADMIN — IPRATROPIUM BROMIDE AND ALBUTEROL SULFATE 1 DOSE: 2.5; .5 SOLUTION RESPIRATORY (INHALATION) at 07:27

## 2024-12-20 RX ADMIN — PANTOPRAZOLE SODIUM 40 MG: 40 INJECTION, POWDER, FOR SOLUTION INTRAVENOUS at 09:48

## 2024-12-20 RX ADMIN — ALBUMIN (HUMAN) 25 G: 0.25 INJECTION, SOLUTION INTRAVENOUS at 18:30

## 2024-12-20 RX ADMIN — FOLIC ACID 1 MG: 1 TABLET ORAL at 09:49

## 2024-12-20 RX ADMIN — SULFAMETHOXAZOLE AND TRIMETHOPRIM 0.5 TABLET: 800; 160 TABLET ORAL at 09:49

## 2024-12-20 RX ADMIN — HYDROCODONE BITARTRATE AND ACETAMINOPHEN 1 TABLET: 5; 325 TABLET ORAL at 08:00

## 2024-12-20 RX ADMIN — SODIUM CHLORIDE, PRESERVATIVE FREE 10 ML: 5 INJECTION INTRAVENOUS at 20:40

## 2024-12-20 RX ADMIN — BUSPIRONE HYDROCHLORIDE 10 MG: 10 TABLET ORAL at 20:39

## 2024-12-20 ASSESSMENT — PAIN SCALES - GENERAL
PAINLEVEL_OUTOF10: 8
PAINLEVEL_OUTOF10: 10
PAINLEVEL_OUTOF10: 6
PAINLEVEL_OUTOF10: 10
PAINLEVEL_OUTOF10: 10
PAINLEVEL_OUTOF10: 9
PAINLEVEL_OUTOF10: 5
PAINLEVEL_OUTOF10: 10
PAINLEVEL_OUTOF10: 10

## 2024-12-20 ASSESSMENT — PAIN DESCRIPTION - PAIN TYPE
TYPE: SURGICAL PAIN
TYPE: SURGICAL PAIN
TYPE: SURGICAL PAIN;CHRONIC PAIN
TYPE: OTHER (COMMENT)

## 2024-12-20 ASSESSMENT — PAIN DESCRIPTION - DESCRIPTORS
DESCRIPTORS: ACHING;THROBBING;DISCOMFORT
DESCRIPTORS: SORE
DESCRIPTORS: DISCOMFORT
DESCRIPTORS: STABBING;BURNING
DESCRIPTORS: OTHER (COMMENT)

## 2024-12-20 ASSESSMENT — PAIN DESCRIPTION - LOCATION
LOCATION: BACK
LOCATION: ABDOMEN
LOCATION: OTHER (COMMENT)
LOCATION: OTHER (COMMENT)
LOCATION: BACK
LOCATION: GENERALIZED
LOCATION: BACK

## 2024-12-20 ASSESSMENT — PAIN DESCRIPTION - ORIENTATION
ORIENTATION: RIGHT
ORIENTATION: RIGHT
ORIENTATION: ANTERIOR
ORIENTATION: RIGHT
ORIENTATION: OTHER (COMMENT)

## 2024-12-20 ASSESSMENT — PAIN - FUNCTIONAL ASSESSMENT
PAIN_FUNCTIONAL_ASSESSMENT: ACTIVITIES ARE NOT PREVENTED
PAIN_FUNCTIONAL_ASSESSMENT: PREVENTS OR INTERFERES SOME ACTIVE ACTIVITIES AND ADLS
PAIN_FUNCTIONAL_ASSESSMENT: PREVENTS OR INTERFERES WITH MANY ACTIVE NOT PASSIVE ACTIVITIES
PAIN_FUNCTIONAL_ASSESSMENT: ACTIVITIES ARE NOT PREVENTED

## 2024-12-20 ASSESSMENT — PAIN DESCRIPTION - FREQUENCY
FREQUENCY: CONTINUOUS
FREQUENCY: CONTINUOUS

## 2024-12-20 NOTE — FLOWSHEET NOTE
12/19/24 1900   Vital Signs   Temp 96.8 °F (36 °C)   Temp Source Axillary   Pulse 80   Heart Rate Source Monitor   BP (!) 144/72   MAP (Calculated) 96   BP Location Left upper arm   BP Method Automatic   Patient Position Semi fowlers   Oxygen Therapy   SpO2 97 %   O2 Device High flow nasal cannula   O2 Flow Rate (L/min) 4 L/min     PM Assessment completed. Scheduled medications given per MAR, On 4L of oxygen, A&O X4, Vital Signs completed and Charted, Patient denies any further needs at this time. Call light within reach, Reminded patient to call RN if she needs anything.

## 2024-12-20 NOTE — PLAN OF CARE
Care Plan, Education, Fall Risk, Romario Scale, and Lift Assessment complete.     Patient is resting and reports no needs at this time.     Problem: Discharge Planning  Goal: Discharge to home or other facility with appropriate resources  Outcome: Progressing     Problem: Pain  Goal: Verbalizes/displays adequate comfort level or baseline comfort level  Outcome: Progressing

## 2024-12-20 NOTE — CARE COORDINATION
Received call from Wilma/[facility].    They can accept patient.    Dialysis to start on Monday 12/23 at 5:45.  CM advised patient will not be ready for discharge until Monday.     Wilma advised they can accept the patient as long at the Rituxin can be given at the infusion clinic at [facility] as they cannot give it at [facility].    CM will check with Dr. Gould.     Wilma will advise Haris that the patient will not be starting on Monday 12/23.    Per Dr. Gould - We have a plan in place - we will work out the Rituxin. Should start precert today.     CM LVM for Wilma/[facility] requesting precert be started today.     Received call from Wilma/[facility]. Wilma will start precert and will add the patient expected admission is Monday. Per Wilma Chavez will probably then ask for updated OT/PT notes. Will need notes done on Sunday.    CM spoke with Ike PT who will pass on to weekend OT/PT that the patient will need Sunday notes.

## 2024-12-20 NOTE — OR NURSING
Pt arrived for image guided random renal biopsy right. Procedure explained including the risk and benefits of the procedure. All questions answered. Pt verbalizes understanding of the of procedure and states no more questions. Consent signed. Vital signs stable, labs, allergies, medications, and code status reviewed. No contraindications noted.     Vitals:    12/20/24 1245   BP: 133/75   Pulse: 58   Resp: 14   Temp: 97 °F (36.1 °C)   SpO2: 99%    (vital signs in table format)    Jhon Score  2 - Able to move 4 extremities voluntarily on command  2 - BP+/- 20mmHg of normal  0 - Not responsive  0 - Oxygen saturation <90% even with the supplemental oxygen  2 - Able to breathe deeply and cough freely    Allergies  Percocet [oxycodone-acetaminophen]    Labs  Lab Results   Component Value Date    INR 1.15 12/20/2024    PROTIME 14.9 12/20/2024       Lab Results   Component Value Date    CREATININE 3.3 (H) 12/20/2024    BUN 64 (H) 12/20/2024     (L) 12/20/2024    K 4.0 12/20/2024    CL 95 (L) 12/20/2024    CO2 23 12/20/2024       Lab Results   Component Value Date    WBC 6.7 12/20/2024    HGB 7.0 (L) 12/20/2024    HCT 21.1 (L) 12/20/2024    MCV 87.8 12/20/2024     (L) 12/20/2024

## 2024-12-20 NOTE — PLAN OF CARE
Problem: Discharge Planning  Goal: Discharge to home or other facility with appropriate resources  12/20/2024 0309 by Amairani Melendez RN  Outcome: Progressing  12/19/2024 1900 by Mickie Holguin RN  Outcome: Progressing     Problem: Pain  Goal: Verbalizes/displays adequate comfort level or baseline comfort level  12/20/2024 0309 by Amairani Melendez RN  Outcome: Progressing  12/19/2024 1900 by Mickie Holguin RN  Outcome: Progressing     Problem: Safety - Adult  Goal: Free from fall injury  Outcome: Progressing     Problem: ABCDS Injury Assessment  Goal: Absence of physical injury  Outcome: Progressing     Problem: Cardiovascular - Adult  Goal: Maintains optimal cardiac output and hemodynamic stability  Outcome: Progressing     Problem: Genitourinary - Adult  Goal: Urinary catheter remains patent  Outcome: Progressing     Problem: Hematologic - Adult  Goal: Maintains hematologic stability  Outcome: Progressing     Problem: Musculoskeletal - Adult  Goal: Return mobility to safest level of function  Outcome: Progressing     Problem: Nutrition Deficit:  Goal: Optimize nutritional status  Outcome: Progressing     Problem: Skin/Tissue Integrity  Goal: Absence of new skin breakdown  Description: 1.  Monitor for areas of redness and/or skin breakdown  2.  Assess vascular access sites hourly  3.  Every 4-6 hours minimum:  Change oxygen saturation probe site  4.  Every 4-6 hours:  If on nasal continuous positive airway pressure, respiratory therapy assess nares and determine need for appliance change or resting period.  Outcome: Progressing

## 2024-12-20 NOTE — ANESTHESIA POSTPROCEDURE EVALUATION
Department of Anesthesiology  Postprocedure Note    Patient: Emily Alegre  MRN: [id number]  YOB: 1955  Date of evaluation: 12/20/2024    Procedure Summary       Date: 12/20/24 Room / Location: [facility] CT Scan; 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    Jhon Phase I: Jhon Score: 10    Jhon Phase II:      Anesthesia Post Evaluation    Patient location during evaluation: PACU  Patient participation: complete - patient participated  Level of consciousness: awake and alert  Airway patency: patent  Nausea & Vomiting: no nausea and no vomiting  Cardiovascular status: blood pressure returned to baseline  Respiratory status: acceptable  Hydration status: euvolemic  Comments: VSS on transfer to phase 2 recovery.  No anesthetic complications.  Pain management: adequate    No notable events documented.

## 2024-12-20 NOTE — OR NURSING
Image guided random renal biopsy biopsy completed by Dr. Bailey. Pt tolerated procedure without any signs or symptoms of distress. Vital signs stable. Report given  to PACU RN. Pt transported back to PACU in stable condition via stretcher.     Total Biopsy: 2  General Anesthesia  Bandage to right that is clean dry and intact.   Patient to lay on right side for 1 hour.     Vital Signs  Vitals:    12/20/24 1245   BP: 133/75   Pulse: 58   Resp: 14   Temp: 97 °F (36.1 °C)   SpO2: 99%    (vital signs in table format)    See Anesthesia Documentation

## 2024-12-20 NOTE — BRIEF OP NOTE
Interventional Radiology  Brief Postoperative Note    Patient: Emily Alegre  YOB: 1955  MRN: [id number]      Pre-operative Diagnosis: FELISA on CKD, concern for vasculitis    Post-operative Diagnosis: Same    Procedure: CT guided random kidney biopsy    Anesthesia: General    Surgeons/Assistants: Alfredito Bailey DO    Estimated Blood Loss: less than 50     Complications: None    Infection Present At Time Of Surgery (PATOS) (choose all levels that have infection present):  No infection present    Specimens: Was Obtained: two 18-G cores    Findings:   Successful CT guided random kidney biopsy via the right kidney.  Biopsy tract was embolized with gel foam.  Post biopsy CT images demonstrated a small hematoma adjacent to the kidney.    Strict bed rest next 4 hours.       Alfredito Bailey DO  12/20/2024, 2:29 PM  Interventional Radiology  [phone] ([id number])

## 2024-12-20 NOTE — FLOWSHEET NOTE
12/20/24 1700   Pain Assessment   Pain Assessment 0-10   Pain Level 10   Patient's Stated Pain Goal Unable to verbalize/indicate pain goal   Pain Location Generalized  (\"all over\")   Pain Descriptors Other (Comment)  (won't verbalize)   Functional Pain Assessment Prevents or interferes with many active not passive activities   Pain Type Other (Comment)   Pain Frequency Continuous   Response to Pain Intervention None (pain unchanged or no improvement)       Patient complaints of pain as shown. Nephrology ordered one time dose tylenol. Tylenol administered as ordered. Patient left the unit for HD.

## 2024-12-20 NOTE — ANESTHESIA PRE PROCEDURE
Department of Anesthesiology  Preprocedure Note       Name:  Emily Alegre   Age:  69 y.o.  :  1955                                          MRN:  [id number]         Date:  2024      Surgeon: * No surgeons listed *    Procedure: * No procedures listed *    Medications prior to admission:   Prior to Admission medications    Medication Sig Start Date End Date Taking? Authorizing Provider   lidocaine (LIDODERM) 5 % Place 1 patch onto the skin daily 12 hours on, 12 hours off.   Yes ProviderPaco MD   vitamin D 25 MCG (1000 UT) CAPS Take 1 capsule by mouth daily    Provider, MD Paco   albuterol sulfate HFA (PROAIR HFA) 108 (90 Base) MCG/ACT inhaler Use 2 puffs every 4 hours while awake PRN wheezing  Dispense with SPACER and Instruct on use.  May sub Ventolin or Proventil as needed per Insurance. 10/4/18   Aubree Joseph, APRN - CNP       Current medications:    Current Facility-Administered Medications   Medication Dose Route Frequency Provider Last Rate Last Admin   • ipratropium 0.5 mg-albuterol 2.5 mg (DUONEB) nebulizer solution 1 Dose  1 Dose Inhalation TID RT Liban Durand MD       • 0.9 % sodium chloride infusion   IntraVENous PRN Jonathan Gould MD       • senna (SENOKOT) tablet 8.6 mg  1 tablet Oral Nightly Magan Mayes MD   8.6 mg at 24   • predniSONE (DELTASONE) tablet 60 mg  60 mg Oral Daily April Deutsch MD   60 mg at 24 0948   • 0.9 % sodium chloride infusion   IntraVENous PRN Jonathan Gould MD       • sulfamethoxazole-trimethoprim (BACTRIM DS;SEPTRA DS) 800-160 MG per tablet 0.5 tablet  0.5 tablet Oral Once per day on  April Deutsch MD   0.5 tablet at 24 0949   • oyster shell calcium w/D 500-5 MG-MCG tablet 1 tablet  1 tablet Oral Daily April Deutsch MD   1 tablet at 24 0949   • HYDROcodone-acetaminophen (NORCO) 5-325 MG per tablet 1 tablet  1 tablet Oral Q4H PRN Yonny Govea MD   1 tablet at

## 2024-12-21 ENCOUNTER — APPOINTMENT (OUTPATIENT)
Dept: CT IMAGING | Age: 69
DRG: 640 | End: 2024-12-21
Payer: MEDICARE

## 2024-12-21 LAB
ABO + RH BLD: NORMAL
ALBUMIN SERPL-MCNC: 2.8 G/DL (ref 3.4–5)
ANION GAP SERPL CALCULATED.3IONS-SCNC: 12 MMOL/L (ref 3–16)
BACTERIA SPEC RESP CULT: ABNORMAL
BACTERIA SPEC RESP CULT: ABNORMAL
BLD GP AB SCN SERPL QL: NORMAL
BLOOD BANK DISPENSE STATUS: NORMAL
BLOOD BANK PRODUCT CODE: NORMAL
BPU ID: NORMAL
BUN SERPL-MCNC: 51 MG/DL (ref 7–20)
CALCIUM SERPL-MCNC: 7.8 MG/DL (ref 8.3–10.6)
CHLORIDE SERPL-SCNC: 95 MMOL/L (ref 99–110)
CO2 SERPL-SCNC: 25 MMOL/L (ref 21–32)
CREAT SERPL-MCNC: 2.7 MG/DL (ref 0.6–1.2)
DEPRECATED RDW RBC AUTO: 16.5 % (ref 12.4–15.4)
DESCRIPTION BLOOD BANK: NORMAL
GFR SERPLBLD CREATININE-BSD FMLA CKD-EPI: 19 ML/MIN/{1.73_M2}
GLUCOSE SERPL-MCNC: 76 MG/DL (ref 70–99)
GRAM STN SPEC: ABNORMAL
HCT VFR BLD AUTO: 14.3 % (ref 36–48)
HCT VFR BLD AUTO: 21.6 % (ref 36–48)
HGB BLD-MCNC: 4.8 G/DL (ref 12–16)
HGB BLD-MCNC: 7.1 G/DL (ref 12–16)
MCH RBC QN AUTO: 29.6 PG (ref 26–34)
MCHC RBC AUTO-ENTMCNC: 33.9 G/DL (ref 31–36)
MCV RBC AUTO: 87.3 FL (ref 80–100)
ORGANISM: ABNORMAL
PHOSPHATE SERPL-MCNC: 5.2 MG/DL (ref 2.5–4.9)
PLATELET # BLD AUTO: 116 K/UL (ref 135–450)
PMV BLD AUTO: 7.6 FL (ref 5–10.5)
POTASSIUM SERPL-SCNC: 4.3 MMOL/L (ref 3.5–5.1)
RBC # BLD AUTO: 1.63 M/UL (ref 4–5.2)
REASON FOR REJECTION: NORMAL
REJECTED TEST: NORMAL
SODIUM SERPL-SCNC: 132 MMOL/L (ref 136–145)
WBC # BLD AUTO: 6.1 K/UL (ref 4–11)

## 2024-12-21 PROCEDURE — 2580000003 HC RX 258: Performed by: INTERNAL MEDICINE

## 2024-12-21 PROCEDURE — 36415 COLL VENOUS BLD VENIPUNCTURE: CPT

## 2024-12-21 PROCEDURE — 6360000002 HC RX W HCPCS: Performed by: INTERNAL MEDICINE

## 2024-12-21 PROCEDURE — 85014 HEMATOCRIT: CPT

## 2024-12-21 PROCEDURE — 80069 RENAL FUNCTION PANEL: CPT

## 2024-12-21 PROCEDURE — 85027 COMPLETE CBC AUTOMATED: CPT

## 2024-12-21 PROCEDURE — 36430 TRANSFUSION BLD/BLD COMPNT: CPT

## 2024-12-21 PROCEDURE — 74176 CT ABD & PELVIS W/O CONTRAST: CPT

## 2024-12-21 PROCEDURE — 99233 SBSQ HOSP IP/OBS HIGH 50: CPT | Performed by: INTERNAL MEDICINE

## 2024-12-21 PROCEDURE — P9016 RBC LEUKOCYTES REDUCED: HCPCS

## 2024-12-21 PROCEDURE — 6370000000 HC RX 637 (ALT 250 FOR IP): Performed by: INTERNAL MEDICINE

## 2024-12-21 PROCEDURE — 86923 COMPATIBILITY TEST ELECTRIC: CPT

## 2024-12-21 PROCEDURE — 2060000000 HC ICU INTERMEDIATE R&B

## 2024-12-21 PROCEDURE — 86901 BLOOD TYPING SEROLOGIC RH(D): CPT

## 2024-12-21 PROCEDURE — 2500000003 HC RX 250 WO HCPCS: Performed by: INTERNAL MEDICINE

## 2024-12-21 PROCEDURE — 94761 N-INVAS EAR/PLS OXIMETRY MLT: CPT

## 2024-12-21 PROCEDURE — 2700000000 HC OXYGEN THERAPY PER DAY

## 2024-12-21 PROCEDURE — 2500000003 HC RX 250 WO HCPCS: Performed by: ANESTHESIOLOGY

## 2024-12-21 PROCEDURE — 74174 CTA ABD&PLVS W/CONTRAST: CPT

## 2024-12-21 PROCEDURE — 85018 HEMOGLOBIN: CPT

## 2024-12-21 PROCEDURE — 86900 BLOOD TYPING SEROLOGIC ABO: CPT

## 2024-12-21 PROCEDURE — 6360000004 HC RX CONTRAST MEDICATION: Performed by: INTERNAL MEDICINE

## 2024-12-21 PROCEDURE — 94640 AIRWAY INHALATION TREATMENT: CPT

## 2024-12-21 PROCEDURE — 86850 RBC ANTIBODY SCREEN: CPT

## 2024-12-21 RX ORDER — IOPAMIDOL 755 MG/ML
100 INJECTION, SOLUTION INTRAVASCULAR
Status: COMPLETED | OUTPATIENT
Start: 2024-12-21 | End: 2024-12-21

## 2024-12-21 RX ORDER — SODIUM CHLORIDE 9 MG/ML
INJECTION, SOLUTION INTRAVENOUS PRN
Status: DISCONTINUED | OUTPATIENT
Start: 2024-12-21 | End: 2024-12-23

## 2024-12-21 RX ADMIN — PIPERACILLIN AND TAZOBACTAM 3375 MG: 3; .375 INJECTION, POWDER, LYOPHILIZED, FOR SOLUTION INTRAVENOUS at 16:03

## 2024-12-21 RX ADMIN — IPRATROPIUM BROMIDE AND ALBUTEROL SULFATE 1 DOSE: 2.5; .5 SOLUTION RESPIRATORY (INHALATION) at 07:32

## 2024-12-21 RX ADMIN — PIPERACILLIN AND TAZOBACTAM 3375 MG: 3; .375 INJECTION, POWDER, LYOPHILIZED, FOR SOLUTION INTRAVENOUS at 03:49

## 2024-12-21 RX ADMIN — IPRATROPIUM BROMIDE AND ALBUTEROL SULFATE 1 DOSE: 2.5; .5 SOLUTION RESPIRATORY (INHALATION) at 14:56

## 2024-12-21 RX ADMIN — DESMOPRESSIN ACETATE 28.56 MCG: 4 INJECTION, SOLUTION INTRAVENOUS; SUBCUTANEOUS at 15:47

## 2024-12-21 RX ADMIN — HYDROCODONE BITARTRATE AND ACETAMINOPHEN 1 TABLET: 5; 325 TABLET ORAL at 08:05

## 2024-12-21 RX ADMIN — IPRATROPIUM BROMIDE AND ALBUTEROL SULFATE 1 DOSE: 2.5; .5 SOLUTION RESPIRATORY (INHALATION) at 20:50

## 2024-12-21 RX ADMIN — SODIUM CHLORIDE, PRESERVATIVE FREE 10 ML: 5 INJECTION INTRAVENOUS at 20:36

## 2024-12-21 RX ADMIN — FOLIC ACID 1 MG: 1 TABLET ORAL at 08:06

## 2024-12-21 RX ADMIN — Medication 10 ML: at 08:09

## 2024-12-21 RX ADMIN — HYDROCODONE BITARTRATE AND ACETAMINOPHEN 1 TABLET: 5; 325 TABLET ORAL at 12:45

## 2024-12-21 RX ADMIN — MIDODRINE HYDROCHLORIDE 10 MG: 10 TABLET ORAL at 08:05

## 2024-12-21 RX ADMIN — BUSPIRONE HYDROCHLORIDE 10 MG: 10 TABLET ORAL at 08:05

## 2024-12-21 RX ADMIN — PREDNISONE 60 MG: 20 TABLET ORAL at 08:05

## 2024-12-21 RX ADMIN — HYDROCODONE BITARTRATE AND ACETAMINOPHEN 1 TABLET: 5; 325 TABLET ORAL at 03:19

## 2024-12-21 RX ADMIN — IOPAMIDOL 100 ML: 755 INJECTION, SOLUTION INTRAVENOUS at 13:45

## 2024-12-21 RX ADMIN — ONDANSETRON 4 MG: 2 INJECTION INTRAMUSCULAR; INTRAVENOUS at 08:39

## 2024-12-21 RX ADMIN — Medication 1 TABLET: at 08:06

## 2024-12-21 RX ADMIN — Medication 10 ML: at 20:35

## 2024-12-21 RX ADMIN — HYDROCODONE BITARTRATE AND ACETAMINOPHEN 1 TABLET: 5; 325 TABLET ORAL at 21:07

## 2024-12-21 RX ADMIN — BUSPIRONE HYDROCHLORIDE 10 MG: 10 TABLET ORAL at 20:35

## 2024-12-21 RX ADMIN — PANTOPRAZOLE SODIUM 40 MG: 40 INJECTION, POWDER, FOR SOLUTION INTRAVENOUS at 08:06

## 2024-12-21 RX ADMIN — MIDODRINE HYDROCHLORIDE 10 MG: 10 TABLET ORAL at 16:59

## 2024-12-21 RX ADMIN — HYDROCODONE BITARTRATE AND ACETAMINOPHEN 1 TABLET: 5; 325 TABLET ORAL at 16:59

## 2024-12-21 ASSESSMENT — PAIN SCALES - WONG BAKER
WONGBAKER_NUMERICALRESPONSE: NO HURT

## 2024-12-21 ASSESSMENT — PAIN SCALES - GENERAL
PAINLEVEL_OUTOF10: 9
PAINLEVEL_OUTOF10: 4
PAINLEVEL_OUTOF10: 4
PAINLEVEL_OUTOF10: 9
PAINLEVEL_OUTOF10: 9
PAINLEVEL_OUTOF10: 4

## 2024-12-21 ASSESSMENT — PAIN DESCRIPTION - ORIENTATION
ORIENTATION: RIGHT;LEFT
ORIENTATION: RIGHT;LEFT

## 2024-12-21 ASSESSMENT — PAIN DESCRIPTION - DESCRIPTORS
DESCRIPTORS: ACHING;SHARP
DESCRIPTORS: ACHING;SHARP

## 2024-12-21 ASSESSMENT — PAIN DESCRIPTION - LOCATION
LOCATION: HAND

## 2024-12-21 NOTE — FLOWSHEET NOTE
12/21/24 1302   Vitals   /62   Temp 98.1 °F (36.7 °C)   Temp Source Axillary   Pulse 64   Respirations 18   SpO2 96 %   Charges - Once Per Day (Any Number of Units)   $Blood Administration Yes     Vitals completed prior to blood administration. Blood hit vein at 1305. This will be the second unit for today. Initial 15 minutes, RN at bedside. No s/s of reaction at this time. Patient to receive CTA at some point. No further needs at this time.     Prior to receiving transfusion of blood products, patient educated on the following:    Blood product transfusion process  Benefits of receiving blood product transfusion  Risks associated with receiving the transfusion  Signs and symptoms of complications associated with blood product transfusion  Vague, uneasy feeling  Onset of pain (especially at the IV site, back, or chest)  Chills  Flushing  Fever  Nausea  Dizziness  Rash  Itching  Dark or red urine  Instructed patient to notify RN immediately if any sign or symptom occurs       Tana Lopes RN

## 2024-12-21 NOTE — PLAN OF CARE
Prior to receiving transfusion of blood products, patient educated on the following:    Blood product transfusion process  Benefits of receiving blood product transfusion  Risks associated with receiving the transfusion  Signs and symptoms of complications associated with blood product transfusion  Vague, uneasy feeling  Onset of pain (especially at the IV site, back, or chest)  Chills  Flushing  Fever  Nausea  Dizziness  Rash  Itching  Dark or red urine  Instructed patient to notify RN immediately if any sign or symptom occurs       Tana Lopes RN

## 2024-12-21 NOTE — FLOWSHEET NOTE
12/21/24 1151   Vital Signs   Temp 97.9 °F (36.6 °C)   Temp Source Axillary   Pulse 66   Heart Rate Source Monitor   Respirations 17   /73   MAP (Calculated) 92   BP Location Left upper arm   BP Method Automatic   Patient Position High fowlers   Pain Assessment   Pain Assessment 0-10   Pain Level 9   Oxygen Therapy   SpO2 95 %   O2 Device Nasal cannula   O2 Flow Rate (L/min) 4 L/min     Blood transfusion completed. No s/s of transfusion reaction. Patient to go down for stat CT. H/H to be ran at 1300.     Tana Lopes RN

## 2024-12-21 NOTE — FLOWSHEET NOTE
12/20/24 2030   Vital Signs   Temp 97 °F (36.1 °C)   Pulse 63   Heart Rate Source Monitor   Respirations 16   /61   MAP (Calculated) 76   BP Location Left upper arm   BP Method Automatic   Patient Position Semi fowlers   Height and Weight   Height 1.651 m (5' 5\")   Weight - Scale 71.4 kg (157 lb 6.5 oz)   Weight Method Bed scale   BSA (Calculated - sq m) 1.81 sq meters   BMI (Calculated) 26.2     PM Assessment completed. Scheduled medications given per MAR, On 4L of oxygen, A&O X4, Vital Signs completed and Charted, Patient denies any further further needs at this time. Call light within reach, Reminded patient to call RN if she needs anything.

## 2024-12-21 NOTE — PLAN OF CARE
Problem: Discharge Planning  Goal: Discharge to home or other facility with appropriate resources  Outcome: Progressing  Flowsheets (Taken 12/20/2024 1009 by Elva Hall, RN)  Discharge to home or other facility with appropriate resources: Identify barriers to discharge with patient and caregiver     Problem: Pain  Goal: Verbalizes/displays adequate comfort level or baseline comfort level  Outcome: Progressing     Problem: Safety - Adult  Goal: Free from fall injury  Outcome: Progressing     Problem: ABCDS Injury Assessment  Goal: Absence of physical injury  Outcome: Progressing     Problem: Cardiovascular - Adult  Goal: Maintains optimal cardiac output and hemodynamic stability  Outcome: Progressing  Flowsheets (Taken 12/20/2024 1009 by Elva Hall, RN)  Maintains optimal cardiac output and hemodynamic stability: Monitor blood pressure and heart rate     Problem: Genitourinary - Adult  Goal: Urinary catheter remains patent  Outcome: Progressing     Problem: Hematologic - Adult  Goal: Maintains hematologic stability  Outcome: Progressing  Flowsheets (Taken 12/20/2024 1009 by Elva Hall, RN)  Maintains hematologic stability: Assess for signs and symptoms of bleeding or hemorrhage     Problem: Musculoskeletal - Adult  Goal: Return mobility to safest level of function  Outcome: Progressing  Flowsheets (Taken 12/20/2024 1009 by Elva Hall, RN)  Return Mobility to Safest Level of Function: Assess patient stability and activity tolerance for standing, transferring and ambulating with or without assistive devices     Problem: Nutrition Deficit:  Goal: Optimize nutritional status  12/20/2024 0821 by Amairani Melendez, RN  Outcome: Progressing  12/20/2024 1547 by Bambi Hall RD, LD  Outcome: Progressing  Flowsheets (Taken 12/20/2024 1539)  Nutrient intake appropriate for improving, restoring, or maintaining nutritional needs:   Assess nutritional status and recommend course of

## 2024-12-21 NOTE — FLOWSHEET NOTE
12/21/24 1705   Vital Signs   Temp 98.2 °F (36.8 °C)   Temp Source Axillary   Pulse 70   Heart Rate Source Monitor   Respirations 17   /67   MAP (Calculated) 87   BP Location Left upper arm   BP Method Automatic   Patient Position Semi fowlers   Oxygen Therapy   SpO2 92 %   O2 Device High flow nasal cannula   O2 Flow Rate (L/min) 4 L/min     Vitals and reassessment completed. No significant changes. No further needs at this time.     Tana Lopes, RN

## 2024-12-21 NOTE — FLOWSHEET NOTE
12/21/24 0906   Vitals   /67   Temp 97.7 °F (36.5 °C)   Temp Source Axillary   Pulse 69   Respirations 18   SpO2 94 %     Initial 15  minutes completed. No signs of transfusion reaction. Rate increased to 120ml/hr. No further needs at this time.     Tana Lopes RN

## 2024-12-21 NOTE — FLOWSHEET NOTE
12/21/24 0800   Vital Signs   Pulse 71   Heart Rate Source Monitor   Respirations 18   /61   MAP (Calculated) 82   BP Location Left upper arm   BP Method Automatic   Patient Position Semi fowlers   Pain Assessment   Pain Assessment 0-10   Pain Level 9   Patient's Stated Pain Goal 0 - No pain   Pain Location Hand   Care Plan - Pain Goals   Verbalizes/displays adequate comfort level or baseline comfort level Encourage patient to monitor pain and request assistance   Oxygen Therapy   SpO2 96 %   O2 Device Nasal cannula   Skin Assessment Clean, dry, & intact   O2 Flow Rate (L/min) 4 L/min     Vitals and AM assessment completed. No s/s of distress. Medications given per MAR, crushed per patient request. Patient to receive blood for hgb 4.8. Not ready at this time. No further needs.     Tana Lopes RN

## 2024-12-21 NOTE — FLOWSHEET NOTE
12/21/24 0846   Vitals   /63   Temp 98 °F (36.7 °C)   Temp Source Axillary   Pulse 64   Respirations 16   SpO2 96 %   Charges - Once Per Day (Any Number of Units)   $Blood Administration Yes     Vitals completed. Blood hit vein at 0851. Rate 60ml/hr. NO s/s of transfusion reaction. RN to remain bedside for initial 15 minutes.       Tana Lopes RN

## 2024-12-21 NOTE — DIALYSIS
Treatment time: 2.5 hours  Net UF: +300 ml     Pre weight: 71.1 kg  Post weight:71.4 kg  EDW: TBD kg  Crit Line Used: Yes Ending Profile: A  Refill Present: Unable to perform     Access used: R DESIRE Hidalgocatzeus    Access function: Well with  ml/min     Medications or blood products given: 25% Albumin 25g     Regular outpatient schedule: MWF [facility]     Summary of response to treatment: Patient tolerated treatment fair with symptomatic hypotension unchanged by albumin and saline interventions. Patient remained stable throughout entire treatment and upon the exiting the dialysis suite via transport.     Report given to Amairani Melendez RN and copy of dialysis treatment record placed in chart, to be scanned into EMR.

## 2024-12-21 NOTE — PLAN OF CARE
Problem: Discharge Planning  Goal: Discharge to home or other facility with appropriate resources  12/21/2024 0856 by Tana Lopes RN  Outcome: Progressing  Flowsheets (Taken 12/21/2024 0830)  Discharge to home or other facility with appropriate resources:   Identify barriers to discharge with patient and caregiver   Arrange for needed discharge resources and transportation as appropriate   Identify discharge learning needs (meds, wound care, etc)  12/20/2024 2351 by Amairani Melendez RN  Outcome: Progressing  Flowsheets (Taken 12/20/2024 1009 by Elva Hall, RN)  Discharge to home or other facility with appropriate resources: Identify barriers to discharge with patient and caregiver     Problem: Pain  Goal: Verbalizes/displays adequate comfort level or baseline comfort level  12/21/2024 0856 by Tana Lopes RN  Outcome: Progressing  Flowsheets (Taken 12/21/2024 0800)  Verbalizes/displays adequate comfort level or baseline comfort level: Encourage patient to monitor pain and request assistance  12/20/2024 2351 by Amairani Melendez RN  Outcome: Progressing     Problem: Safety - Adult  Goal: Free from fall injury  12/21/2024 0856 by Tana Lopes RN  Outcome: Progressing  12/20/2024 2351 by Amairani Melendez RN  Outcome: Progressing     Problem: ABCDS Injury Assessment  Goal: Absence of physical injury  12/21/2024 0856 by Tana Lopes RN  Outcome: Progressing  12/20/2024 2351 by Amairani Melendez RN  Outcome: Progressing     Problem: Cardiovascular - Adult  Goal: Maintains optimal cardiac output and hemodynamic stability  12/21/2024 0856 by Tana Lopes RN  Outcome: Progressing  Flowsheets (Taken 12/21/2024 0830)  Maintains optimal cardiac output and hemodynamic stability: Monitor blood pressure and heart rate  12/20/2024 2351 by Amairani Melendez RN  Outcome: Progressing  Flowsheets (Taken 12/20/2024 1009 by Elva Hall RN)  Maintains optimal cardiac output and hemodynamic

## 2024-12-22 PROBLEM — D62 ACUTE BLOOD LOSS ANEMIA: Status: ACTIVE | Noted: 2024-12-22

## 2024-12-22 LAB
ALBUMIN SERPL-MCNC: 2.7 G/DL (ref 3.4–5)
ANION GAP SERPL CALCULATED.3IONS-SCNC: 11 MMOL/L (ref 3–16)
BASOPHILS # BLD: 0 K/UL (ref 0–0.2)
BASOPHILS # BLD: 0 K/UL (ref 0–0.2)
BASOPHILS # BLD: 0.1 K/UL (ref 0–0.2)
BASOPHILS NFR BLD: 0.2 %
BASOPHILS NFR BLD: 0.4 %
BASOPHILS NFR BLD: 0.6 %
BLOOD BANK DISPENSE STATUS: NORMAL
BLOOD BANK DISPENSE STATUS: NORMAL
BLOOD BANK PRODUCT CODE: NORMAL
BLOOD BANK PRODUCT CODE: NORMAL
BPU ID: NORMAL
BPU ID: NORMAL
BUN SERPL-MCNC: 64 MG/DL (ref 7–20)
CALCIUM SERPL-MCNC: 7.8 MG/DL (ref 8.3–10.6)
CHLORIDE SERPL-SCNC: 94 MMOL/L (ref 99–110)
CO2 SERPL-SCNC: 25 MMOL/L (ref 21–32)
CREAT SERPL-MCNC: 3.3 MG/DL (ref 0.6–1.2)
DEPRECATED RDW RBC AUTO: 15.6 % (ref 12.4–15.4)
DEPRECATED RDW RBC AUTO: 15.7 % (ref 12.4–15.4)
DEPRECATED RDW RBC AUTO: 15.8 % (ref 12.4–15.4)
DESCRIPTION BLOOD BANK: NORMAL
DESCRIPTION BLOOD BANK: NORMAL
EOSINOPHIL # BLD: 0 K/UL (ref 0–0.6)
EOSINOPHIL NFR BLD: 0 %
EOSINOPHIL NFR BLD: 0.1 %
EOSINOPHIL NFR BLD: 0.1 %
GFR SERPLBLD CREATININE-BSD FMLA CKD-EPI: 15 ML/MIN/{1.73_M2}
GLUCOSE SERPL-MCNC: 76 MG/DL (ref 70–99)
HCT VFR BLD AUTO: 19.5 % (ref 36–48)
HCT VFR BLD AUTO: 20.5 % (ref 36–48)
HCT VFR BLD AUTO: 21.1 % (ref 36–48)
HCT VFR BLD AUTO: 21.7 % (ref 36–48)
HCT VFR BLD AUTO: 21.8 % (ref 36–48)
HCT VFR BLD AUTO: 22.2 % (ref 36–48)
HGB BLD-MCNC: 6.7 G/DL (ref 12–16)
HGB BLD-MCNC: 6.9 G/DL (ref 12–16)
HGB BLD-MCNC: 7.3 G/DL (ref 12–16)
HGB BLD-MCNC: 7.4 G/DL (ref 12–16)
HGB BLD-MCNC: 7.5 G/DL (ref 12–16)
HGB BLD-MCNC: 7.5 G/DL (ref 12–16)
LYMPHOCYTES # BLD: 0.1 K/UL (ref 1–5.1)
LYMPHOCYTES # BLD: 0.2 K/UL (ref 1–5.1)
LYMPHOCYTES # BLD: 0.2 K/UL (ref 1–5.1)
LYMPHOCYTES NFR BLD: 1.2 %
LYMPHOCYTES NFR BLD: 1.6 %
LYMPHOCYTES NFR BLD: 3 %
MCH RBC QN AUTO: 29.5 PG (ref 26–34)
MCH RBC QN AUTO: 30.3 PG (ref 26–34)
MCH RBC QN AUTO: 30.6 PG (ref 26–34)
MCHC RBC AUTO-ENTMCNC: 33.9 G/DL (ref 31–36)
MCHC RBC AUTO-ENTMCNC: 34.7 G/DL (ref 31–36)
MCHC RBC AUTO-ENTMCNC: 34.7 G/DL (ref 31–36)
MCV RBC AUTO: 86.8 FL (ref 80–100)
MCV RBC AUTO: 87.3 FL (ref 80–100)
MCV RBC AUTO: 88.4 FL (ref 80–100)
MONOCYTES # BLD: 0.2 K/UL (ref 0–1.3)
MONOCYTES # BLD: 0.3 K/UL (ref 0–1.3)
MONOCYTES # BLD: 0.4 K/UL (ref 0–1.3)
MONOCYTES NFR BLD: 2.6 %
MONOCYTES NFR BLD: 3.8 %
MONOCYTES NFR BLD: 4.6 %
NEUTROPHILS # BLD: 6.1 K/UL (ref 1.7–7.7)
NEUTROPHILS # BLD: 6.3 K/UL (ref 1.7–7.7)
NEUTROPHILS # BLD: 8.8 K/UL (ref 1.7–7.7)
NEUTROPHILS NFR BLD: 91.9 %
NEUTROPHILS NFR BLD: 93.9 %
NEUTROPHILS NFR BLD: 96 %
PHOSPHATE SERPL-MCNC: 6.1 MG/DL (ref 2.5–4.9)
PLATELET # BLD AUTO: 112 K/UL (ref 135–450)
PLATELET # BLD AUTO: 118 K/UL (ref 135–450)
PLATELET # BLD AUTO: 124 K/UL (ref 135–450)
PMV BLD AUTO: 7.1 FL (ref 5–10.5)
PMV BLD AUTO: 7.1 FL (ref 5–10.5)
PMV BLD AUTO: 7.3 FL (ref 5–10.5)
POTASSIUM SERPL-SCNC: 4.7 MMOL/L (ref 3.5–5.1)
RBC # BLD AUTO: 2.23 M/UL (ref 4–5.2)
RBC # BLD AUTO: 2.36 M/UL (ref 4–5.2)
RBC # BLD AUTO: 2.38 M/UL (ref 4–5.2)
SODIUM SERPL-SCNC: 130 MMOL/L (ref 136–145)
WBC # BLD AUTO: 6.6 K/UL (ref 4–11)
WBC # BLD AUTO: 6.6 K/UL (ref 4–11)
WBC # BLD AUTO: 9.4 K/UL (ref 4–11)

## 2024-12-22 PROCEDURE — 2500000003 HC RX 250 WO HCPCS: Performed by: INTERNAL MEDICINE

## 2024-12-22 PROCEDURE — 6360000002 HC RX W HCPCS: Performed by: INTERNAL MEDICINE

## 2024-12-22 PROCEDURE — 2700000000 HC OXYGEN THERAPY PER DAY

## 2024-12-22 PROCEDURE — 6370000000 HC RX 637 (ALT 250 FOR IP): Performed by: INTERNAL MEDICINE

## 2024-12-22 PROCEDURE — 94761 N-INVAS EAR/PLS OXIMETRY MLT: CPT

## 2024-12-22 PROCEDURE — 2580000003 HC RX 258: Performed by: INTERNAL MEDICINE

## 2024-12-22 PROCEDURE — 94669 MECHANICAL CHEST WALL OSCILL: CPT

## 2024-12-22 PROCEDURE — 36430 TRANSFUSION BLD/BLD COMPNT: CPT

## 2024-12-22 PROCEDURE — 99233 SBSQ HOSP IP/OBS HIGH 50: CPT | Performed by: INTERNAL MEDICINE

## 2024-12-22 PROCEDURE — 85018 HEMOGLOBIN: CPT

## 2024-12-22 PROCEDURE — 2060000000 HC ICU INTERMEDIATE R&B

## 2024-12-22 PROCEDURE — 80069 RENAL FUNCTION PANEL: CPT

## 2024-12-22 PROCEDURE — 2500000003 HC RX 250 WO HCPCS: Performed by: ANESTHESIOLOGY

## 2024-12-22 PROCEDURE — 90935 HEMODIALYSIS ONE EVALUATION: CPT

## 2024-12-22 PROCEDURE — 94640 AIRWAY INHALATION TREATMENT: CPT

## 2024-12-22 PROCEDURE — 85014 HEMATOCRIT: CPT

## 2024-12-22 PROCEDURE — 85025 COMPLETE CBC W/AUTO DIFF WBC: CPT

## 2024-12-22 RX ORDER — SODIUM CHLORIDE 9 MG/ML
INJECTION, SOLUTION INTRAVENOUS PRN
Status: DISCONTINUED | OUTPATIENT
Start: 2024-12-22 | End: 2024-12-23

## 2024-12-22 RX ADMIN — Medication 10 ML: at 08:59

## 2024-12-22 RX ADMIN — MIDODRINE HYDROCHLORIDE 10 MG: 10 TABLET ORAL at 11:23

## 2024-12-22 RX ADMIN — Medication 2 PUFF: at 20:09

## 2024-12-22 RX ADMIN — PANTOPRAZOLE SODIUM 40 MG: 40 INJECTION, POWDER, FOR SOLUTION INTRAVENOUS at 08:46

## 2024-12-22 RX ADMIN — SODIUM CHLORIDE, PRESERVATIVE FREE 10 ML: 5 INJECTION INTRAVENOUS at 20:39

## 2024-12-22 RX ADMIN — MIDODRINE HYDROCHLORIDE 10 MG: 10 TABLET ORAL at 08:52

## 2024-12-22 RX ADMIN — HYDROCODONE BITARTRATE AND ACETAMINOPHEN 1 TABLET: 5; 325 TABLET ORAL at 15:27

## 2024-12-22 RX ADMIN — HYDROCODONE BITARTRATE AND ACETAMINOPHEN 1 TABLET: 5; 325 TABLET ORAL at 01:44

## 2024-12-22 RX ADMIN — MIDODRINE HYDROCHLORIDE 10 MG: 10 TABLET ORAL at 17:51

## 2024-12-22 RX ADMIN — HYDROCODONE BITARTRATE AND ACETAMINOPHEN 1 TABLET: 5; 325 TABLET ORAL at 08:50

## 2024-12-22 RX ADMIN — HYDROCODONE BITARTRATE AND ACETAMINOPHEN 1 TABLET: 5; 325 TABLET ORAL at 20:38

## 2024-12-22 RX ADMIN — IPRATROPIUM BROMIDE AND ALBUTEROL SULFATE 1 DOSE: 2.5; .5 SOLUTION RESPIRATORY (INHALATION) at 07:44

## 2024-12-22 RX ADMIN — PREDNISONE 60 MG: 20 TABLET ORAL at 08:51

## 2024-12-22 RX ADMIN — FOLIC ACID 1 MG: 1 TABLET ORAL at 08:53

## 2024-12-22 RX ADMIN — BUSPIRONE HYDROCHLORIDE 10 MG: 10 TABLET ORAL at 08:50

## 2024-12-22 RX ADMIN — ONDANSETRON 4 MG: 4 TABLET, ORALLY DISINTEGRATING ORAL at 01:52

## 2024-12-22 RX ADMIN — Medication 1 TABLET: at 08:54

## 2024-12-22 RX ADMIN — HEPARIN SODIUM 2400 UNITS: 1000 INJECTION INTRAVENOUS; SUBCUTANEOUS at 15:13

## 2024-12-22 RX ADMIN — Medication 10 ML: at 20:39

## 2024-12-22 RX ADMIN — EPOETIN ALFA-EPBX 10000 UNITS: 10000 INJECTION, SOLUTION INTRAVENOUS; SUBCUTANEOUS at 15:30

## 2024-12-22 RX ADMIN — PIPERACILLIN AND TAZOBACTAM 3375 MG: 3; .375 INJECTION, POWDER, LYOPHILIZED, FOR SOLUTION INTRAVENOUS at 15:33

## 2024-12-22 RX ADMIN — PIPERACILLIN AND TAZOBACTAM 3375 MG: 3; .375 INJECTION, POWDER, LYOPHILIZED, FOR SOLUTION INTRAVENOUS at 04:30

## 2024-12-22 RX ADMIN — BUSPIRONE HYDROCHLORIDE 10 MG: 10 TABLET ORAL at 20:38

## 2024-12-22 ASSESSMENT — PAIN SCALES - WONG BAKER
WONGBAKER_NUMERICALRESPONSE: NO HURT
WONGBAKER_NUMERICALRESPONSE: NO HURT
WONGBAKER_NUMERICALRESPONSE: HURTS LITTLE MORE

## 2024-12-22 ASSESSMENT — PAIN SCALES - GENERAL
PAINLEVEL_OUTOF10: 9
PAINLEVEL_OUTOF10: 9
PAINLEVEL_OUTOF10: 4
PAINLEVEL_OUTOF10: 9
PAINLEVEL_OUTOF10: 9
PAINLEVEL_OUTOF10: 4
PAINLEVEL_OUTOF10: 10
PAINLEVEL_OUTOF10: 7

## 2024-12-22 ASSESSMENT — PAIN DESCRIPTION - DESCRIPTORS
DESCRIPTORS: ACHING
DESCRIPTORS: ACHING

## 2024-12-22 ASSESSMENT — PAIN DESCRIPTION - ORIENTATION
ORIENTATION: UPPER
ORIENTATION: RIGHT;LEFT

## 2024-12-22 ASSESSMENT — PAIN DESCRIPTION - LOCATION
LOCATION: HAND
LOCATION: BACK

## 2024-12-22 NOTE — FLOWSHEET NOTE
12/22/24 0904   Vital Signs   Temp 98.5 °F (36.9 °C)   Temp Source Axillary   Pulse 87   Heart Rate Source Monitor   Respirations 19   BP (!) 130/54   MAP (Calculated) 79   BP Location Left upper arm   BP Method Automatic   Patient Position Right side   Pain Assessment   Pain Assessment 0-10   Pain Level 9   Oxygen Therapy   SpO2 93 %   O2 Device High flow nasal cannula   O2 Flow Rate (L/min) 4 L/min     Vitals and assessment completed. No s/s of distress. Medications given per MAR. No further needs at this time.     Tana Lopes, RN

## 2024-12-22 NOTE — FLOWSHEET NOTE
12/22/24 1130   Vital Signs   Temp 97.7 °F (36.5 °C)   Temp Source Axillary   Pulse 70   Heart Rate Source Monitor   Respirations 19   /71   MAP (Calculated) 93   BP Location Left upper arm   BP Method Automatic   Patient Position Right side   Oxygen Therapy   SpO2 94 %   O2 Device High flow nasal cannula   O2 Flow Rate (L/min) 4 L/min     Vitals and reassessment completed. No significant changes. Midodrine given. H&H drawn and sent to lab prior to dialysis. No further needs. Patient off the floor for dialysis at this time.     Tana Lopes RN

## 2024-12-22 NOTE — PLAN OF CARE
Problem: Discharge Planning  Goal: Discharge to home or other facility with appropriate resources  Outcome: Progressing  Flowsheets (Taken 12/22/2024 0900)  Discharge to home or other facility with appropriate resources:   Identify barriers to discharge with patient and caregiver   Arrange for needed discharge resources and transportation as appropriate   Identify discharge learning needs (meds, wound care, etc)     Problem: Pain  Goal: Verbalizes/displays adequate comfort level or baseline comfort level  Outcome: Progressing     Problem: Safety - Adult  Goal: Free from fall injury  Outcome: Progressing     Problem: ABCDS Injury Assessment  Goal: Absence of physical injury  Outcome: Progressing     Problem: Cardiovascular - Adult  Goal: Maintains optimal cardiac output and hemodynamic stability  Outcome: Progressing  Flowsheets (Taken 12/22/2024 0900)  Maintains optimal cardiac output and hemodynamic stability: Monitor blood pressure and heart rate     Problem: Genitourinary - Adult  Goal: Urinary catheter remains patent  Outcome: Progressing  Flowsheets (Taken 12/22/2024 0900)  Urinary catheter remains patent: Assess patency of urinary catheter     Problem: Hematologic - Adult  Goal: Maintains hematologic stability  Outcome: Progressing  Flowsheets (Taken 12/22/2024 0900)  Maintains hematologic stability: Assess for signs and symptoms of bleeding or hemorrhage     Problem: Musculoskeletal - Adult  Goal: Return mobility to safest level of function  Outcome: Progressing  Flowsheets (Taken 12/22/2024 0900)  Return Mobility to Safest Level of Function: Assess patient stability and activity tolerance for standing, transferring and ambulating with or without assistive devices     Problem: Nutrition Deficit:  Goal: Optimize nutritional status  Outcome: Progressing     Problem: Skin/Tissue Integrity  Goal: Absence of new skin breakdown  Description: 1.  Monitor for areas of redness and/or skin breakdown  2.  Assess

## 2024-12-22 NOTE — FLOWSHEET NOTE
12/22/24 1843   Vitals   /79   Temp 98.1 °F (36.7 °C)   Pulse 78   Respirations 17   SpO2 96 %   Charges - Once Per Day (Any Number of Units)   $Blood Administration Yes     Blood hit vein at 1847. Education completed prior to administration.       Prior to receiving transfusion of blood products, patient educated on the following:    Blood product transfusion process  Benefits of receiving blood product transfusion  Risks associated with receiving the transfusion  Signs and symptoms of complications associated with blood product transfusion  Vague, uneasy feeling  Onset of pain (especially at the IV site, back, or chest)  Chills  Flushing  Fever  Nausea  Dizziness  Rash  Itching  Dark or red urine  Instructed patient to notify RN immediately if any sign or symptom occurs    RN to remain at bedside until 1903. Should be completed by 1047.    Tana Lopes RN

## 2024-12-22 NOTE — FLOWSHEET NOTE
12/22/24 1520   Vital Signs   Temp 97.9 °F (36.6 °C)   Temp Source Axillary   Pulse 70   Heart Rate Source Monitor   Respirations 18   BP (!) 143/74   MAP (Calculated) 97   BP Location Left upper arm   BP Method Automatic   Patient Position Right side   Pain Assessment   Pain Assessment 0-10   Pain Level 9   Oxygen Therapy   SpO2 94 %   O2 Device High flow nasal cannula   O2 Flow Rate (L/min) 4 L/min     Patient back from dialysis. 1 liter removed. Medications given per MAR. Reassessment unremarkable. No further needs at this time.     Tana Lopes, RN

## 2024-12-22 NOTE — FLOWSHEET NOTE
12/22/24 0015   Vital Signs   Temp 97.6 °F (36.4 °C)   Temp Source Axillary   Pulse 75   Heart Rate Source Monitor   Respirations 16   /74   MAP (Calculated) 94   BP Location Left upper arm   BP Method Automatic   Patient Position Semi fowlers   Oxygen Therapy   SpO2 94 %   O2 Device High flow nasal cannula   O2 Flow Rate (L/min) 4 L/min     Patient resting in bed watching tv. No S/S of acute distress noted. Call light in easy reach.

## 2024-12-23 LAB
BASOPHILS # BLD: 0 K/UL (ref 0–0.2)
BASOPHILS # BLD: 0.1 K/UL (ref 0–0.2)
BASOPHILS # BLD: 0.1 K/UL (ref 0–0.2)
BASOPHILS NFR BLD: 0.3 %
BASOPHILS NFR BLD: 0.7 %
BASOPHILS NFR BLD: 0.8 %
BLOOD BANK DISPENSE STATUS: NORMAL
BLOOD BANK PRODUCT CODE: NORMAL
BPU ID: NORMAL
DEPRECATED RDW RBC AUTO: 15.3 % (ref 12.4–15.4)
DEPRECATED RDW RBC AUTO: 15.4 % (ref 12.4–15.4)
DEPRECATED RDW RBC AUTO: 15.7 % (ref 12.4–15.4)
DESCRIPTION BLOOD BANK: NORMAL
EOSINOPHIL # BLD: 0 K/UL (ref 0–0.6)
EOSINOPHIL NFR BLD: 0 %
EOSINOPHIL NFR BLD: 0 %
EOSINOPHIL NFR BLD: 0.2 %
HCT VFR BLD AUTO: 20.2 % (ref 36–48)
HCT VFR BLD AUTO: 20.9 % (ref 36–48)
HCT VFR BLD AUTO: 21.7 % (ref 36–48)
HCT VFR BLD AUTO: 22.2 % (ref 36–48)
HGB BLD-MCNC: 6.9 G/DL (ref 12–16)
HGB BLD-MCNC: 7.2 G/DL (ref 12–16)
HGB BLD-MCNC: 7.5 G/DL (ref 12–16)
HGB BLD-MCNC: 7.6 G/DL (ref 12–16)
INR PPP: 1.17 (ref 0.85–1.15)
LYMPHOCYTES # BLD: 0.1 K/UL (ref 1–5.1)
LYMPHOCYTES # BLD: 0.1 K/UL (ref 1–5.1)
LYMPHOCYTES # BLD: 0.2 K/UL (ref 1–5.1)
LYMPHOCYTES NFR BLD: 1.4 %
LYMPHOCYTES NFR BLD: 1.6 %
LYMPHOCYTES NFR BLD: 3.2 %
MCH RBC QN AUTO: 29.9 PG (ref 26–34)
MCH RBC QN AUTO: 30.2 PG (ref 26–34)
MCH RBC QN AUTO: 30.4 PG (ref 26–34)
MCHC RBC AUTO-ENTMCNC: 34.1 G/DL (ref 31–36)
MCHC RBC AUTO-ENTMCNC: 34.3 G/DL (ref 31–36)
MCHC RBC AUTO-ENTMCNC: 34.5 G/DL (ref 31–36)
MCV RBC AUTO: 87.3 FL (ref 80–100)
MCV RBC AUTO: 88 FL (ref 80–100)
MCV RBC AUTO: 88.3 FL (ref 80–100)
MONOCYTES # BLD: 0.1 K/UL (ref 0–1.3)
MONOCYTES # BLD: 0.2 K/UL (ref 0–1.3)
MONOCYTES # BLD: 0.4 K/UL (ref 0–1.3)
MONOCYTES NFR BLD: 2 %
MONOCYTES NFR BLD: 2.1 %
MONOCYTES NFR BLD: 6 %
NEUTROPHILS # BLD: 6.1 K/UL (ref 1.7–7.7)
NEUTROPHILS # BLD: 6.4 K/UL (ref 1.7–7.7)
NEUTROPHILS # BLD: 6.9 K/UL (ref 1.7–7.7)
NEUTROPHILS NFR BLD: 89.8 %
NEUTROPHILS NFR BLD: 95.7 %
NEUTROPHILS NFR BLD: 96.2 %
PLATELET # BLD AUTO: 123 K/UL (ref 135–450)
PLATELET # BLD AUTO: 124 K/UL (ref 135–450)
PLATELET # BLD AUTO: 126 K/UL (ref 135–450)
PMV BLD AUTO: 7 FL (ref 5–10.5)
PMV BLD AUTO: 7.1 FL (ref 5–10.5)
PMV BLD AUTO: 7.1 FL (ref 5–10.5)
PROTHROMBIN TIME: 15.1 SEC (ref 11.9–14.9)
RBC # BLD AUTO: 2.29 M/UL (ref 4–5.2)
RBC # BLD AUTO: 2.37 M/UL (ref 4–5.2)
RBC # BLD AUTO: 2.55 M/UL (ref 4–5.2)
WBC # BLD AUTO: 6.7 K/UL (ref 4–11)
WBC # BLD AUTO: 6.8 K/UL (ref 4–11)
WBC # BLD AUTO: 7.2 K/UL (ref 4–11)

## 2024-12-23 PROCEDURE — 6370000000 HC RX 637 (ALT 250 FOR IP): Performed by: INTERNAL MEDICINE

## 2024-12-23 PROCEDURE — 85014 HEMATOCRIT: CPT

## 2024-12-23 PROCEDURE — 85018 HEMOGLOBIN: CPT

## 2024-12-23 PROCEDURE — 2580000003 HC RX 258: Performed by: INTERNAL MEDICINE

## 2024-12-23 PROCEDURE — 99153 MOD SED SAME PHYS/QHP EA: CPT

## 2024-12-23 PROCEDURE — 6360000002 HC RX W HCPCS: Performed by: INTERNAL MEDICINE

## 2024-12-23 PROCEDURE — 36430 TRANSFUSION BLD/BLD COMPNT: CPT

## 2024-12-23 PROCEDURE — 0JHD3XZ INSERTION OF TUNNELED VASCULAR ACCESS DEVICE INTO RIGHT UPPER ARM SUBCUTANEOUS TISSUE AND FASCIA, PERCUTANEOUS APPROACH: ICD-10-PCS | Performed by: STUDENT IN AN ORGANIZED HEALTH CARE EDUCATION/TRAINING PROGRAM

## 2024-12-23 PROCEDURE — 6360000002 HC RX W HCPCS: Performed by: STUDENT IN AN ORGANIZED HEALTH CARE EDUCATION/TRAINING PROGRAM

## 2024-12-23 PROCEDURE — 97530 THERAPEUTIC ACTIVITIES: CPT

## 2024-12-23 PROCEDURE — 05PY33Z REMOVAL OF INFUSION DEVICE FROM UPPER VEIN, PERCUTANEOUS APPROACH: ICD-10-PCS | Performed by: STUDENT IN AN ORGANIZED HEALTH CARE EDUCATION/TRAINING PROGRAM

## 2024-12-23 PROCEDURE — 2060000000 HC ICU INTERMEDIATE R&B

## 2024-12-23 PROCEDURE — 94761 N-INVAS EAR/PLS OXIMETRY MLT: CPT

## 2024-12-23 PROCEDURE — 99233 SBSQ HOSP IP/OBS HIGH 50: CPT | Performed by: INTERNAL MEDICINE

## 2024-12-23 PROCEDURE — 36558 INSERT TUNNELED CV CATH: CPT

## 2024-12-23 PROCEDURE — 94640 AIRWAY INHALATION TREATMENT: CPT

## 2024-12-23 PROCEDURE — 77001 FLUOROGUIDE FOR VEIN DEVICE: CPT

## 2024-12-23 PROCEDURE — 85610 PROTHROMBIN TIME: CPT

## 2024-12-23 PROCEDURE — 2500000003 HC RX 250 WO HCPCS: Performed by: INTERNAL MEDICINE

## 2024-12-23 PROCEDURE — 85025 COMPLETE CBC W/AUTO DIFF WBC: CPT

## 2024-12-23 PROCEDURE — 99152 MOD SED SAME PHYS/QHP 5/>YRS: CPT

## 2024-12-23 PROCEDURE — 2700000000 HC OXYGEN THERAPY PER DAY

## 2024-12-23 RX ORDER — MIDAZOLAM HYDROCHLORIDE 1 MG/ML
INJECTION, SOLUTION INTRAMUSCULAR; INTRAVENOUS PRN
Status: COMPLETED | OUTPATIENT
Start: 2024-12-23 | End: 2024-12-23

## 2024-12-23 RX ORDER — SODIUM CHLORIDE 9 MG/ML
INJECTION, SOLUTION INTRAVENOUS PRN
Status: DISCONTINUED | OUTPATIENT
Start: 2024-12-23 | End: 2024-12-24 | Stop reason: HOSPADM

## 2024-12-23 RX ORDER — CIPROFLOXACIN 500 MG/1
500 TABLET, FILM COATED ORAL
Status: DISCONTINUED | OUTPATIENT
Start: 2024-12-23 | End: 2024-12-24 | Stop reason: HOSPADM

## 2024-12-23 RX ORDER — FENTANYL CITRATE 50 UG/ML
INJECTION, SOLUTION INTRAMUSCULAR; INTRAVENOUS PRN
Status: COMPLETED | OUTPATIENT
Start: 2024-12-23 | End: 2024-12-23

## 2024-12-23 RX ADMIN — BUSPIRONE HYDROCHLORIDE 10 MG: 10 TABLET ORAL at 08:32

## 2024-12-23 RX ADMIN — Medication 2 PUFF: at 20:35

## 2024-12-23 RX ADMIN — MIDAZOLAM 0.5 MG: 1 INJECTION INTRAMUSCULAR; INTRAVENOUS at 14:31

## 2024-12-23 RX ADMIN — FENTANYL CITRATE 25 MCG: 50 INJECTION INTRAMUSCULAR; INTRAVENOUS at 14:18

## 2024-12-23 RX ADMIN — PIPERACILLIN AND TAZOBACTAM 3375 MG: 3; .375 INJECTION, POWDER, LYOPHILIZED, FOR SOLUTION INTRAVENOUS at 03:35

## 2024-12-23 RX ADMIN — MIDAZOLAM 0.5 MG: 1 INJECTION INTRAMUSCULAR; INTRAVENOUS at 14:18

## 2024-12-23 RX ADMIN — FENTANYL CITRATE 25 MCG: 50 INJECTION INTRAMUSCULAR; INTRAVENOUS at 14:28

## 2024-12-23 RX ADMIN — HYDROCODONE BITARTRATE AND ACETAMINOPHEN 1 TABLET: 5; 325 TABLET ORAL at 15:52

## 2024-12-23 RX ADMIN — PREDNISONE 60 MG: 20 TABLET ORAL at 08:31

## 2024-12-23 RX ADMIN — POLYETHYLENE GLYCOL (3350) 17 G: 17 POWDER, FOR SOLUTION ORAL at 20:03

## 2024-12-23 RX ADMIN — Medication 2 PUFF: at 07:42

## 2024-12-23 RX ADMIN — HYDROCODONE BITARTRATE AND ACETAMINOPHEN 1 TABLET: 5; 325 TABLET ORAL at 08:31

## 2024-12-23 RX ADMIN — SODIUM CHLORIDE, PRESERVATIVE FREE 10 ML: 5 INJECTION INTRAVENOUS at 20:04

## 2024-12-23 RX ADMIN — MIDAZOLAM 0.5 MG: 1 INJECTION INTRAMUSCULAR; INTRAVENOUS at 14:28

## 2024-12-23 RX ADMIN — CIPROFLOXACIN HYDROCHLORIDE 500 MG: 500 TABLET, FILM COATED ORAL at 08:31

## 2024-12-23 RX ADMIN — SULFAMETHOXAZOLE AND TRIMETHOPRIM 0.5 TABLET: 800; 160 TABLET ORAL at 08:31

## 2024-12-23 RX ADMIN — Medication 1 TABLET: at 08:32

## 2024-12-23 RX ADMIN — PANTOPRAZOLE SODIUM 40 MG: 40 INJECTION, POWDER, FOR SOLUTION INTRAVENOUS at 08:40

## 2024-12-23 RX ADMIN — HYDROCODONE BITARTRATE AND ACETAMINOPHEN 1 TABLET: 5; 325 TABLET ORAL at 20:43

## 2024-12-23 RX ADMIN — ONDANSETRON 4 MG: 2 INJECTION INTRAMUSCULAR; INTRAVENOUS at 08:51

## 2024-12-23 RX ADMIN — FENTANYL CITRATE 25 MCG: 50 INJECTION INTRAMUSCULAR; INTRAVENOUS at 14:32

## 2024-12-23 RX ADMIN — HYDROCODONE BITARTRATE AND ACETAMINOPHEN 1 TABLET: 5; 325 TABLET ORAL at 01:09

## 2024-12-23 RX ADMIN — SENNOSIDES 8.6 MG: 8.6 TABLET, FILM COATED ORAL at 20:03

## 2024-12-23 RX ADMIN — POLYETHYLENE GLYCOL (3350) 17 G: 17 POWDER, FOR SOLUTION ORAL at 08:32

## 2024-12-23 RX ADMIN — BUSPIRONE HYDROCHLORIDE 10 MG: 10 TABLET ORAL at 20:03

## 2024-12-23 RX ADMIN — FOLIC ACID 1 MG: 1 TABLET ORAL at 08:31

## 2024-12-23 ASSESSMENT — PAIN DESCRIPTION - DESCRIPTORS
DESCRIPTORS: ACHING
DESCRIPTORS: BURNING;STABBING;ACHING

## 2024-12-23 ASSESSMENT — PAIN DESCRIPTION - ORIENTATION
ORIENTATION: OTHER (COMMENT)
ORIENTATION: LOWER;RIGHT;LEFT

## 2024-12-23 ASSESSMENT — PAIN DESCRIPTION - ONSET: ONSET: ON-GOING

## 2024-12-23 ASSESSMENT — PAIN SCALES - WONG BAKER: WONGBAKER_NUMERICALRESPONSE: HURTS LITTLE MORE

## 2024-12-23 ASSESSMENT — PAIN DESCRIPTION - LOCATION
LOCATION: GENERALIZED
LOCATION: HAND;BACK
LOCATION: OTHER (COMMENT)
LOCATION: GENERALIZED

## 2024-12-23 ASSESSMENT — PAIN DESCRIPTION - PAIN TYPE: TYPE: ACUTE PAIN

## 2024-12-23 ASSESSMENT — PAIN SCALES - GENERAL
PAINLEVEL_OUTOF10: 9
PAINLEVEL_OUTOF10: 5
PAINLEVEL_OUTOF10: 4
PAINLEVEL_OUTOF10: 8
PAINLEVEL_OUTOF10: 8
PAINLEVEL_OUTOF10: 4
PAINLEVEL_OUTOF10: 9

## 2024-12-23 ASSESSMENT — PAIN DESCRIPTION - FREQUENCY: FREQUENCY: CONTINUOUS

## 2024-12-23 NOTE — CARE COORDINATION
LVM for Wilma/[facility] requesting update on precert.  Precert still pending for Kathy.    Patient is out of bed days - used 18 days at [facility] and 17 days at [facility]. Patient will need to pay $203.00 per day as skilled.    Once precert is approved Wilma at [facility] will look at the Medicaid pending.     Provided Lexie's number in Finance so that Wilma can discuss the pending Medicaid with the patient.     Wilma/[facility] will meet with the patient at bedside today.

## 2024-12-23 NOTE — PLAN OF CARE
Problem: Discharge Planning  Goal: Discharge to home or other facility with appropriate resources  12/23/2024 1046 by Viviana Jiang, RN  Outcome: Progressing  Flowsheets (Taken 12/23/2024 1046)  Discharge to home or other facility with appropriate resources: Identify barriers to discharge with patient and caregiver     Problem: Pain  Goal: Verbalizes/displays adequate comfort level or baseline comfort level  12/23/2024 1046 by Viviana Jiang, RN  Outcome: Progressing  Flowsheets (Taken 12/23/2024 1046)  Verbalizes/displays adequate comfort level or baseline comfort level:   Assess pain using appropriate pain scale   Administer analgesics based on type and severity of pain and evaluate response

## 2024-12-23 NOTE — OR NURSING
Image guided tunneled dialysis catheter completed. Dr. Bailey placed a 14.5 Polish 19 cm Bard [employer]ide[employer] tunneled dialysis catheter LOT [id number] EXP 06/30/25 in the right IJ. Pt current access vascath to tunneled line. Line aspirates and flushes with ease. Dialysis catheter secured with sutures. Surgical site dressing clean, dry, and intact. Each dialysis access lumen heparin locked with 1.6 ml of IV heparin each. Pt tolerated procedure without any signs or symptoms of distress. Vital signs stable. Report called to Viviana WOODS on PCU. Pt transported back to PCU in stable condition via bed by transport. Line ok to use per Lynn. Pt on 4L via [place].    Vital Signs  Vitals:    12/23/24 1435   BP: 137/73   Pulse: 75   Resp: 20   Temp:    SpO2: 98%    Pt received 1.5 mg versed and 75 mcg of fentanyl during the procedure.     This RN wasted the following with Fartun ALBA RN.  0.5 mg Versed  25 mcg Fentanyl

## 2024-12-23 NOTE — PLAN OF CARE
Problem: Discharge Planning  Goal: Discharge to home or other facility with appropriate resources  Outcome: Progressing     Problem: Pain  Goal: Verbalizes/displays adequate comfort level or baseline comfort level  Outcome: Progressing     Problem: Safety - Adult  Goal: Free from fall injury  Outcome: Progressing     Problem: ABCDS Injury Assessment  Goal: Absence of physical injury  Outcome: Progressing     Problem: Cardiovascular - Adult  Goal: Maintains optimal cardiac output and hemodynamic stability  Outcome: Progressing     Problem: Genitourinary - Adult  Goal: Urinary catheter remains patent  Outcome: Progressing     Problem: Hematologic - Adult  Goal: Maintains hematologic stability  Outcome: Progressing     Problem: Musculoskeletal - Adult  Goal: Return mobility to safest level of function  Outcome: Progressing     Problem: Nutrition Deficit:  Goal: Optimize nutritional status  Outcome: Progressing     Problem: Skin/Tissue Integrity  Goal: Absence of new skin breakdown  Description: 1.  Monitor for areas of redness and/or skin breakdown  2.  Assess vascular access sites hourly  3.  Every 4-6 hours minimum:  Change oxygen saturation probe site  4.  Every 4-6 hours:  If on nasal continuous positive airway pressure, respiratory therapy assess nares and determine need for appliance change or resting period.  Outcome: Progressing

## 2024-12-23 NOTE — PRE SEDATION
Sedation Pre-Procedure Note    Patient Name: Emily Alegre   YOB: 1955  Room/Bed: /0308-01  Medical Record Number: [id number]  Date: 2024   Time: 1:02 PM       Indication:  Renal failure, conversion of temporary to tunneled HD catheter requested.     Consent: I have discussed with the patient and/or the patient representative the indication, alternatives, and the possible risks and/or complications of the planned procedure and the anesthesia methods. The patient and/or patient representative appear to understand and agree to proceed.    Vital Signs:   Vitals:    24 1128   BP: 138/75   Pulse: 70   Resp: 16   Temp: 98.6 °F (37 °C)   SpO2: 96%       Past Medical History:   has a past medical history of Asthma, Chronic bronchiolitis (HCC), and Throat cancer (HCC).    Past Surgical History:   has a past surgical history that includes Uterine Suspension;  section; tracheostomy (2024); IR NONTUNNELED VASCULAR CATHETER > 5 YEARS (2024); bronchoscopy (2024); bronchoscopy (N/A, 2024); bronchoscopy (2024); and CT BIOPSY RENAL (2024).    Medications:   Scheduled Meds:    ciprofloxacin  500 mg Oral Daily    ipratropium 0.5 mg-albuterol 2.5 mg  1 Dose Inhalation TID RT    senna  1 tablet Oral Nightly    predniSONE  60 mg Oral Daily    sulfamethoxazole-trimethoprim  0.5 tablet Oral Once per day on     oyster shell calcium w/D  1 tablet Oral Daily    pantoprazole (PROTONIX) 40 mg in sodium chloride (PF) 0.9 % 10 mL injection  40 mg IntraVENous Daily    busPIRone  10 mg Oral BID    folic acid  1 mg Oral Daily    midodrine  10 mg Oral TID WC    sodium chloride flush  5-40 mL IntraVENous 2 times per day    [Held by provider] heparin (porcine)  5,000 Units SubCUTAneous 3 times per day    polyethylene glycol  17 g Oral BID    lidocaine  1 patch TransDERmal Daily     Continuous Infusions:    sodium chloride       PRN Meds: albumin human 25%,

## 2024-12-23 NOTE — FLOWSHEET NOTE
12/22/24 2330   Vital Signs   Temp 97.5 °F (36.4 °C)   Temp Source Axillary   Pulse 68   Heart Rate Source Monitor   Respirations 16   /67   MAP (Calculated) 89   BP Location Left upper arm   BP Method Automatic   Patient Position Lying right side   Oxygen Therapy   SpO2 96 %   O2 Device High flow nasal cannula   O2 Flow Rate (L/min) 4 L/min     Patient resting in bed. No S/S of acute distress noted. Call light in easy reach.

## 2024-12-23 NOTE — BRIEF OP NOTE
Interventional Radiology  Brief Postoperative Note    Patient: Emily Alegre  YOB: 1955  MRN: [id number]      Pre-operative Diagnosis: Renal failure    Post-operative Diagnosis: Same    Procedure: Temp to tunnel HD catheter placement    Anesthesia: Local and Moderate Sedation    Surgeons/Assistants: Alfredito Bailey DO    Estimated Blood Loss: less than 50     Complications: None    Infection Present At Time Of Surgery (PATOS) (choose all levels that have infection present):  No infection present    Specimens: Was Not Obtained    Findings:   Successful conversion of right temporary HD catheter to a tunneled HD catheter. Ready for immediate use.       Alfredito Bailey DO  12/23/2024, 2:44 PM  Interventional Radiology  [phone] ([phone])

## 2024-12-23 NOTE — OR NURSING
Pt arrived for image guided tunneled dialysis catheter insertion. Procedure explained including the risk and benefits of the procedure. All questions answered. Pt verbalizes understanding of the procedure and states no more questions. Consent confirmed. Vital signs stable. Labs, allergies, medications, and code status reviewed. No contraindications noted.     Vital Signs  Vitals:    12/23/24 1412   BP: (!) 143/83   Pulse: 71   Resp: 19   Temp:    SpO2: 92%    (vital signs in table format)    Pre Jhon Score  2 - Able to move 4 extremities voluntarily on command  2 - BP+/- 20mmHg of normal  2 - Fully awake  1 - Needs oxygen to maintain oxygen saturation >90%  2 - Able to breathe deeply and cough freely    Allergies  Percocet [oxycodone-acetaminophen] (allergies)    Labs  Lab Results   Component Value Date    INR 1.17 (H) 12/23/2024    PROTIME 15.1 (H) 12/23/2024     Lab Results   Component Value Date    CREATININE 3.3 (H) 12/22/2024    BUN 64 (H) 12/22/2024     (L) 12/22/2024    K 4.7 12/22/2024    CL 94 (L) 12/22/2024    CO2 25 12/22/2024     Lab Results   Component Value Date    WBC 7.2 12/23/2024    HGB 7.2 (L) 12/23/2024    HCT 20.9 (LL) 12/23/2024    MCV 88.0 12/23/2024     (L) 12/23/2024       Time out completed prior to procedure.  Pt was place supine on the IR table.  Pt was placed on all cardiac monitoring. Pt arrived on 4L of O2 via [place] O2 increased to 6 L NC for sedation.

## 2024-12-23 NOTE — FLOWSHEET NOTE
12/23/24 0652   Vitals   Temp 97.4 °F (36.3 °C)   Temp Source Axillary   Pulse 76   Heart Rate Source Monitor   Respirations 16   /76   MAP (Calculated) 97   BP Location Left upper arm   BP Method Automatic   Patient Position Lying left side   Oxygen Therapy   SpO2 94 %   O2 Device High flow nasal cannula   O2 Flow Rate (L/min) 4 L/min     Shift assessment completed-see flow sheet. Patient in bed awake,alert and oriented x4. Forgetful at times.  Vitals stable. Morning medications given per order.  Patient repositioned in bed.  STAT INR obtained and sent to lab. Possible Tunneled Cath today.  Patient C/o pain, PRN pain medication given per request-see MAR.   Patient denies any further needs,call light within reach.

## 2024-12-24 VITALS
WEIGHT: 163.36 LBS | DIASTOLIC BLOOD PRESSURE: 82 MMHG | HEIGHT: 65 IN | TEMPERATURE: 97.6 F | HEART RATE: 77 BPM | BODY MASS INDEX: 27.22 KG/M2 | RESPIRATION RATE: 20 BRPM | SYSTOLIC BLOOD PRESSURE: 156 MMHG | OXYGEN SATURATION: 96 %

## 2024-12-24 LAB
ALBUMIN SERPL-MCNC: 2.7 G/DL (ref 3.4–5)
ANION GAP SERPL CALCULATED.3IONS-SCNC: 11 MMOL/L (ref 3–16)
BASOPHILS # BLD: 0 K/UL (ref 0–0.2)
BASOPHILS # BLD: 0.1 K/UL (ref 0–0.2)
BASOPHILS NFR BLD: 0.5 %
BASOPHILS NFR BLD: 0.8 %
BUN SERPL-MCNC: 45 MG/DL (ref 7–20)
CALCIUM SERPL-MCNC: 7.9 MG/DL (ref 8.3–10.6)
CHLORIDE SERPL-SCNC: 94 MMOL/L (ref 99–110)
CO2 SERPL-SCNC: 24 MMOL/L (ref 21–32)
CREAT SERPL-MCNC: 3.7 MG/DL (ref 0.6–1.2)
DEPRECATED RDW RBC AUTO: 15.2 % (ref 12.4–15.4)
DEPRECATED RDW RBC AUTO: 15.6 % (ref 12.4–15.4)
EOSINOPHIL # BLD: 0 K/UL (ref 0–0.6)
EOSINOPHIL # BLD: 0 K/UL (ref 0–0.6)
EOSINOPHIL NFR BLD: 0 %
EOSINOPHIL NFR BLD: 0.2 %
FUNGUS SPEC CULT: ABNORMAL
GFR SERPLBLD CREATININE-BSD FMLA CKD-EPI: 13 ML/MIN/{1.73_M2}
GLUCOSE SERPL-MCNC: 65 MG/DL (ref 70–99)
HCT VFR BLD AUTO: 22.6 % (ref 36–48)
HCT VFR BLD AUTO: 23.1 % (ref 36–48)
HGB BLD-MCNC: 7.8 G/DL (ref 12–16)
HGB BLD-MCNC: 7.8 G/DL (ref 12–16)
LOEFFLER MB STN SPEC: ABNORMAL
LYMPHOCYTES # BLD: 0.2 K/UL (ref 1–5.1)
LYMPHOCYTES # BLD: 0.2 K/UL (ref 1–5.1)
LYMPHOCYTES NFR BLD: 2.5 %
LYMPHOCYTES NFR BLD: 3.1 %
MCH RBC QN AUTO: 30.2 PG (ref 26–34)
MCH RBC QN AUTO: 30.8 PG (ref 26–34)
MCHC RBC AUTO-ENTMCNC: 33.7 G/DL (ref 31–36)
MCHC RBC AUTO-ENTMCNC: 34.4 G/DL (ref 31–36)
MCV RBC AUTO: 89.6 FL (ref 80–100)
MCV RBC AUTO: 89.6 FL (ref 80–100)
MONOCYTES # BLD: 0.3 K/UL (ref 0–1.3)
MONOCYTES # BLD: 0.4 K/UL (ref 0–1.3)
MONOCYTES NFR BLD: 3.4 %
MONOCYTES NFR BLD: 4.3 %
NEUTROPHILS # BLD: 6.8 K/UL (ref 1.7–7.7)
NEUTROPHILS # BLD: 7.8 K/UL (ref 1.7–7.7)
NEUTROPHILS NFR BLD: 92.5 %
NEUTROPHILS NFR BLD: 92.7 %
ORGANISM: ABNORMAL
PHOSPHATE SERPL-MCNC: 5.6 MG/DL (ref 2.5–4.9)
PLATELET # BLD AUTO: 113 K/UL (ref 135–450)
PLATELET # BLD AUTO: 144 K/UL (ref 135–450)
PMV BLD AUTO: 6.9 FL (ref 5–10.5)
PMV BLD AUTO: 7.6 FL (ref 5–10.5)
POTASSIUM SERPL-SCNC: 4.3 MMOL/L (ref 3.5–5.1)
RBC # BLD AUTO: 2.52 M/UL (ref 4–5.2)
RBC # BLD AUTO: 2.58 M/UL (ref 4–5.2)
SODIUM SERPL-SCNC: 129 MMOL/L (ref 136–145)
WBC # BLD AUTO: 7.3 K/UL (ref 4–11)
WBC # BLD AUTO: 8.4 K/UL (ref 4–11)

## 2024-12-24 PROCEDURE — 6370000000 HC RX 637 (ALT 250 FOR IP): Performed by: INTERNAL MEDICINE

## 2024-12-24 PROCEDURE — 80069 RENAL FUNCTION PANEL: CPT

## 2024-12-24 PROCEDURE — 90935 HEMODIALYSIS ONE EVALUATION: CPT

## 2024-12-24 PROCEDURE — 85025 COMPLETE CBC W/AUTO DIFF WBC: CPT

## 2024-12-24 PROCEDURE — 99239 HOSP IP/OBS DSCHRG MGMT >30: CPT | Performed by: INTERNAL MEDICINE

## 2024-12-24 PROCEDURE — 99232 SBSQ HOSP IP/OBS MODERATE 35: CPT | Performed by: INTERNAL MEDICINE

## 2024-12-24 PROCEDURE — 6360000002 HC RX W HCPCS: Performed by: INTERNAL MEDICINE

## 2024-12-24 PROCEDURE — 94640 AIRWAY INHALATION TREATMENT: CPT

## 2024-12-24 PROCEDURE — 2700000000 HC OXYGEN THERAPY PER DAY

## 2024-12-24 PROCEDURE — 94761 N-INVAS EAR/PLS OXIMETRY MLT: CPT

## 2024-12-24 PROCEDURE — 2500000003 HC RX 250 WO HCPCS: Performed by: INTERNAL MEDICINE

## 2024-12-24 RX ORDER — CIPROFLOXACIN 500 MG/1
500 TABLET, FILM COATED ORAL
Qty: 4 TABLET | Refills: 0
Start: 2024-12-25 | End: 2024-12-29

## 2024-12-24 RX ORDER — PREDNISONE 20 MG/1
60 TABLET ORAL DAILY
Qty: 90 TABLET | Refills: 0
Start: 2024-12-25 | End: 2025-01-24

## 2024-12-24 RX ORDER — HEPARIN SODIUM 1000 [USP'U]/ML
3200 INJECTION, SOLUTION INTRAVENOUS; SUBCUTANEOUS PRN
Status: DISCONTINUED | OUTPATIENT
Start: 2024-12-24 | End: 2024-12-24 | Stop reason: HOSPADM

## 2024-12-24 RX ORDER — BUSPIRONE HYDROCHLORIDE 10 MG/1
10 TABLET ORAL 2 TIMES DAILY
Qty: 30 TABLET | Refills: 0
Start: 2024-12-24

## 2024-12-24 RX ORDER — FOLIC ACID 1 MG/1
1 TABLET ORAL DAILY
Qty: 30 TABLET | Refills: 3
Start: 2024-12-25

## 2024-12-24 RX ORDER — BENZONATATE 100 MG/1
100 CAPSULE ORAL 3 TIMES DAILY PRN
Qty: 30 CAPSULE | Refills: 0
Start: 2024-12-24

## 2024-12-24 RX ORDER — PANTOPRAZOLE SODIUM 40 MG/1
40 TABLET, DELAYED RELEASE ORAL
Qty: 30 TABLET | Refills: 0
Start: 2024-12-24

## 2024-12-24 RX ORDER — MIDODRINE HYDROCHLORIDE 10 MG/1
10 TABLET ORAL
DISCHARGE
Start: 2024-12-24

## 2024-12-24 RX ORDER — SULFAMETHOXAZOLE AND TRIMETHOPRIM 800; 160 MG/1; MG/1
0.5 TABLET ORAL
Qty: 6 TABLET | Refills: 0
Start: 2024-12-25 | End: 2025-01-24

## 2024-12-24 RX ORDER — IPRATROPIUM BROMIDE AND ALBUTEROL SULFATE 2.5; .5 MG/3ML; MG/3ML
3 SOLUTION RESPIRATORY (INHALATION)
Qty: 360 ML | Refills: 0
Start: 2024-12-24

## 2024-12-24 RX ORDER — HYDROCODONE BITARTRATE AND ACETAMINOPHEN 5; 325 MG/1; MG/1
1 TABLET ORAL EVERY 4 HOURS PRN
Qty: 12 TABLET | Refills: 0 | Status: SHIPPED | OUTPATIENT
Start: 2024-12-24 | End: 2024-12-27

## 2024-12-24 RX ORDER — LIDOCAINE 4 G/G
1 PATCH TOPICAL DAILY
Qty: 30 EACH | Refills: 0
Start: 2024-12-25

## 2024-12-24 RX ADMIN — PREDNISONE 60 MG: 20 TABLET ORAL at 09:56

## 2024-12-24 RX ADMIN — HYDROCODONE BITARTRATE AND ACETAMINOPHEN 1 TABLET: 5; 325 TABLET ORAL at 17:46

## 2024-12-24 RX ADMIN — CIPROFLOXACIN HYDROCHLORIDE 500 MG: 500 TABLET, FILM COATED ORAL at 09:57

## 2024-12-24 RX ADMIN — FOLIC ACID 1 MG: 1 TABLET ORAL at 09:56

## 2024-12-24 RX ADMIN — HYDROCODONE BITARTRATE AND ACETAMINOPHEN 1 TABLET: 5; 325 TABLET ORAL at 10:22

## 2024-12-24 RX ADMIN — HYDROCODONE BITARTRATE AND ACETAMINOPHEN 1 TABLET: 5; 325 TABLET ORAL at 06:37

## 2024-12-24 RX ADMIN — PANTOPRAZOLE SODIUM 40 MG: 40 INJECTION, POWDER, FOR SOLUTION INTRAVENOUS at 10:25

## 2024-12-24 RX ADMIN — SODIUM CHLORIDE, PRESERVATIVE FREE 10 ML: 5 INJECTION INTRAVENOUS at 10:28

## 2024-12-24 RX ADMIN — HYDROCODONE BITARTRATE AND ACETAMINOPHEN 1 TABLET: 5; 325 TABLET ORAL at 01:21

## 2024-12-24 RX ADMIN — Medication 2 PUFF: at 08:05

## 2024-12-24 RX ADMIN — BUSPIRONE HYDROCHLORIDE 10 MG: 10 TABLET ORAL at 09:56

## 2024-12-24 RX ADMIN — Medication 1 TABLET: at 09:57

## 2024-12-24 ASSESSMENT — PAIN DESCRIPTION - DESCRIPTORS
DESCRIPTORS: ACHING;STABBING
DESCRIPTORS: ACHING
DESCRIPTORS: BURNING;STABBING
DESCRIPTORS: STABBING;BURNING
DESCRIPTORS: ACHING
DESCRIPTORS: ACHING

## 2024-12-24 ASSESSMENT — PAIN DESCRIPTION - ONSET
ONSET: ON-GOING

## 2024-12-24 ASSESSMENT — PAIN DESCRIPTION - LOCATION
LOCATION: ARM;HAND
LOCATION: OTHER (COMMENT)
LOCATION: GENERALIZED
LOCATION: OTHER (COMMENT)
LOCATION: OTHER (COMMENT)
LOCATION: GENERALIZED

## 2024-12-24 ASSESSMENT — PAIN SCALES - GENERAL
PAINLEVEL_OUTOF10: 10
PAINLEVEL_OUTOF10: 3
PAINLEVEL_OUTOF10: 9
PAINLEVEL_OUTOF10: 9
PAINLEVEL_OUTOF10: 3

## 2024-12-24 ASSESSMENT — PAIN DESCRIPTION - ORIENTATION: ORIENTATION: RIGHT;LEFT

## 2024-12-24 ASSESSMENT — PAIN DESCRIPTION - FREQUENCY
FREQUENCY: CONTINUOUS

## 2024-12-24 NOTE — FLOWSHEET NOTE
12/24/24 0634   Vitals   Temp 97.5 °F (36.4 °C)   Temp Source Oral   Pulse 75   Heart Rate Source Monitor   Respirations 16   BP (!) 141/79   MAP (Calculated) 100   BP Location Left upper arm   BP Method Automatic   Patient Position Semi fowlers   Pain Assessment   Pain Assessment 0-10   Pain Level 9   Patient's Stated Pain Goal 0 - No pain   Pain Location Other (Comment)  (Whole body)   Pain Descriptors Burning;Stabbing   Pain Frequency Continuous   Pain Onset On-going   Oxygen Therapy   SpO2 98 %   O2 Device High flow nasal cannula   O2 Flow Rate (L/min) 4 L/min     Shift assessment completed-see flow sheet.Patient in bed awake,alert and oriented x4.  Vitals stable. Morning medications given per order.   Patient with c/o pain-PRN medication given per order.   Patient repositioned in bed.  No further needs expressed at this time,call light within reach.

## 2024-12-24 NOTE — PLAN OF CARE
Problem: Discharge Planning  Goal: Discharge to home or other facility with appropriate resources  12/24/2024 1818 by Lucas Hooks RN  Outcome: Adequate for Discharge  12/24/2024 1119 by Viviana Jiang RN  Outcome: Progressing     Problem: Pain  Goal: Verbalizes/displays adequate comfort level or baseline comfort level  12/24/2024 1818 by Lucas Hooks RN  Outcome: Adequate for Discharge  12/24/2024 1119 by Viviana Jiang RN  Outcome: Progressing  Flowsheets (Taken 12/24/2024 1119)  Verbalizes/displays adequate comfort level or baseline comfort level:   Encourage patient to monitor pain and request assistance   Assess pain using appropriate pain scale   Administer analgesics based on type and severity of pain and evaluate response     Problem: Safety - Adult  Goal: Free from fall injury  12/24/2024 1818 by Lucas Hooks RN  Outcome: Adequate for Discharge     Problem: ABCDS Injury Assessment  Goal: Absence of physical injury  12/24/2024 1818 by Lucas Hooks RN  Outcome: Adequate for Discharge     Problem: Cardiovascular - Adult  Goal: Maintains optimal cardiac output and hemodynamic stability  12/24/2024 1818 by Lucas Hooks RN  Outcome: Adequate for Discharge  12/24/2024 1119 by Viviana Jiang RN  Outcome: Progressing     Problem: Genitourinary - Adult  Goal: Urinary catheter remains patent  12/24/2024 1818 by Lucas Hooks RN  Outcome: Adequate for Discharge     Problem: Hematologic - Adult  Goal: Maintains hematologic stability  12/24/2024 1818 by Lucas Hooks RN  Outcome: Adequate for Discharge     Problem: Musculoskeletal - Adult  Goal: Return mobility to safest level of function  12/24/2024 1818 by Lucas Hooks RN  Outcome: Adequate for Discharge     Problem: Nutrition Deficit:  Goal: Optimize nutritional status  12/24/2024 1818 by Lucas Hooks RN  Outcome: Adequate for Discharge  Flowsheets (Taken 12/24/2024 1223 by Cleo Alejo RD, LD)  Nutrient intake appropriate for

## 2024-12-24 NOTE — CARE COORDINATION
Spoke with Wilma/[facility] for update on patient.    Per Wilma she met with the patient's son and explained that the precert is till pending for skilled nursing at [facility]. The patient is out of bed days and the copay at [facility] is $325.00 per day.     Wilma explained to the son that if the precert is denied the options are:    1) pay the copay of $325.00 per day  2) provide all documents as requested to make the patient eligible for Medicaid  3) take the patient home    The son is to be at [facility] today and will bring the requested documents. The son will either call Wilma to  the documents or ask  to fax documents to Wilma.

## 2024-12-24 NOTE — DISCHARGE INSTR - COC
MANAGEMENT/SOCIAL WORK SECTION    Inpatient Status Date: 12/10/24    Readmission Risk Assessment Score:  [facility] RISK OF UNPLANNED READMISSION 2.0             17.7 Total Score        Discharging to Facility/ Agency   Name: [facility]  Address:  Phone: [phone]  Fax:    Dialysis Facility (if applicable)   Name:  Address:  Dialysis Schedule:  Phone:  Fax:    / signature: Electronically signed by Teresa Boeck, RN on 12/24/24 at 3:56 PM EST    PHYSICIAN SECTION    Prognosis: Fair    Condition at Discharge: Stable    Rehab Potential (if transferring to Rehab): Good    Recommended Labs or Other Treatments After Discharge: PT and OT. Hemo dialysis. OP follow up nephrology    Physician Certification: I certify the above information and transfer of Emily Alegre  is necessary for the continuing treatment of the diagnosis listed and that she requires Skilled Nursing Facility for less 30 days.     Update Admission H&P: No change in H&P    PHYSICIAN SIGNATURE:  Electronically signed by KIMBERLY SIMONS MD on 12/24/24 at 4:08 PM EST

## 2024-12-24 NOTE — PROGRESS NOTES
ONCOLOGY HEMATOLOGY CARE PROGRESS NOTE      SUBJECTIVE:    Cr still elevated and now on dialysis. Started on steroids last week. Rash and swelling improved.     ROS:     Constitutional:  No weight loss, No fever, No chills, No night sweats.  Energy level good.  Eyes:  No impairment or change in vision  ENT / Mouth:  No pain, abnormal ulceration, bleeding, nasal drip or change in voice or hearing  Cardiovascular:  No chest pain, palpitations, new edema, or calf discomfort  Respiratory:  No pain, hemoptysis, change to breathing  Breast:  No pain, discharge, change in appearance or texture  Gastrointestinal:  No pain, cramping, jaundice, change to eating and bowel habits  Urinary:  No pain, bleeding or change in continence  Genitalia: No pain, bleeding or discharge  Musculoskeletal:  No redness, pain, edema or weakness  Skin:  No pruritus, rash, change to nodules or lesions  Neurologic:  No discomfort, change in mental status, speech, sensory or motor activity  Psychiatric:  No change in concentration or change to affect or mood  Endocrine:  No hot flashes, increased thirst, or change to urine production  Hematologic: No petechiae, ecchymosis or bleeding  Lymphatic:  No lymphadenopathy or lymphedema  Allergy / Immunologic:  No eczema, hives, frequent or recurrent infections    OBJECTIVE        Physical    VITALS:  Patient Vitals for the past 24 hrs:   BP Temp Temp src Pulse Resp SpO2 Height   12/18/24 0451 -- -- -- -- 18 -- --   12/18/24 0421 -- -- -- -- 18 -- --   12/18/24 0346 139/86 97.8 °F (36.6 °C) Oral 72 18 96 % --   12/17/24 2328 (!) 140/88 97.6 °F (36.4 °C) Axillary 65 18 95 % --   12/17/24 2258 -- -- -- -- 16 -- --   12/17/24 2228 -- -- -- -- 18 -- --   12/17/24 2033 -- -- -- 68 18 94 % --   12/17/24 1945 (!) 137/92 97.9 °F (36.6 °C) Oral 77 16 97 % --   12/17/24 1406 -- -- -- -- -- -- 1.651 m (5' 5\")   12/17/24 1330 (!) 142/81 97.1 °F (36.2 °C) Oral 66 14 92 % -- 
                                      ONCOLOGY HEMATOLOGY CARE PROGRESS NOTE      SUBJECTIVE:    Had dialysis yesterday. Started on steroids last week. Rash and swelling improved.     ROS:     Constitutional:  No weight loss, No fever, No chills, No night sweats.  Energy level good.  Eyes:  No impairment or change in vision  ENT / Mouth:  No pain, abnormal ulceration, bleeding, nasal drip or change in voice or hearing  Cardiovascular:  No chest pain, palpitations, new edema, or calf discomfort  Respiratory:  No pain, hemoptysis, change to breathing  Breast:  No pain, discharge, change in appearance or texture  Gastrointestinal:  No pain, cramping, jaundice, change to eating and bowel habits  Urinary:  No pain, bleeding or change in continence  Genitalia: No pain, bleeding or discharge  Musculoskeletal:  No redness, pain, edema or weakness  Skin:  No pruritus, rash, change to nodules or lesions  Neurologic:  No discomfort, change in mental status, speech, sensory or motor activity  Psychiatric:  No change in concentration or change to affect or mood  Endocrine:  No hot flashes, increased thirst, or change to urine production  Hematologic: No petechiae, ecchymosis or bleeding  Lymphatic:  No lymphadenopathy or lymphedema  Allergy / Immunologic:  No eczema, hives, frequent or recurrent infections    OBJECTIVE        Physical    VITALS:  Patient Vitals for the past 24 hrs:   BP Temp Temp src Pulse Resp SpO2 Weight   12/19/24 0712 -- -- -- -- -- 96 % --   12/19/24 0334 -- -- -- -- 18 -- --   12/19/24 0245 (!) 154/81 97.8 °F (36.6 °C) Oral 90 -- 94 % --   12/18/24 2135 -- -- -- -- 18 -- --   12/18/24 1946 -- -- -- 82 18 96 % --   12/18/24 1930 (!) 142/82 97.4 °F (36.3 °C) Axillary 80 18 96 % --   12/18/24 1528 -- -- -- -- 18 -- --   12/18/24 1515 134/87 97.7 °F (36.5 °C) Axillary 82 18 94 % --   12/18/24 1512 -- -- -- 85 18 93 % --   12/18/24 1132 -- -- -- -- 18 -- --   12/18/24 1111 -- -- -- 84 18 94 % --   12/18/24 1045 
                                      ONCOLOGY HEMATOLOGY CARE PROGRESS NOTE      SUBJECTIVE:    Remains in ICU.  Cr still elevated and now on dialysis. Started on steroids last week. Rash and swelling improved. C/O dental pain this AM.    ROS:     Constitutional:  No weight loss, No fever, No chills, No night sweats.  Energy level good.  Eyes:  No impairment or change in vision  ENT / Mouth:  No pain, abnormal ulceration, bleeding, nasal drip or change in voice or hearing  Cardiovascular:  No chest pain, palpitations, new edema, or calf discomfort  Respiratory:  No pain, hemoptysis, change to breathing  Breast:  No pain, discharge, change in appearance or texture  Gastrointestinal:  No pain, cramping, jaundice, change to eating and bowel habits  Urinary:  No pain, bleeding or change in continence  Genitalia: No pain, bleeding or discharge  Musculoskeletal:  No redness, pain, edema or weakness  Skin:  No pruritus, rash, change to nodules or lesions  Neurologic:  No discomfort, change in mental status, speech, sensory or motor activity  Psychiatric:  No change in concentration or change to affect or mood  Endocrine:  No hot flashes, increased thirst, or change to urine production  Hematologic: No petechiae, ecchymosis or bleeding  Lymphatic:  No lymphadenopathy or lymphedema  Allergy / Immunologic:  No eczema, hives, frequent or recurrent infections    OBJECTIVE        Physical    VITALS:  Patient Vitals for the past 24 hrs:   BP Temp Temp src Pulse Resp SpO2   12/16/24 0455 (!) 149/88 97.3 °F (36.3 °C) Axillary 80 16 100 %   12/16/24 0441 -- -- -- -- 16 --   12/16/24 0037 -- -- -- -- 16 --   12/16/24 0010 (!) 142/69 97.3 °F (36.3 °C) Oral 70 18 96 %   12/15/24 2018 131/76 97.7 °F (36.5 °C) Oral 70 18 94 %   12/15/24 1916 -- -- -- 78 18 94 %   12/15/24 1626 138/80 97.9 °F (36.6 °C) Oral 68 20 93 %   12/15/24 1500 -- -- -- 74 18 92 %   12/15/24 1138 124/76 97.3 °F (36.3 °C) Axillary 79 20 96 %   12/15/24 1058 -- -- -- 
                                      ONCOLOGY HEMATOLOGY CARE PROGRESS NOTE      SUBJECTIVE:    Remains in ICU. Transfused 12/11. Cr still elevated. Started on steroids yesterday. Rash and swelling improved.    ROS:     Constitutional:  No weight loss, No fever, No chills, No night sweats.  Energy level good.  Eyes:  No impairment or change in vision  ENT / Mouth:  No pain, abnormal ulceration, bleeding, nasal drip or change in voice or hearing  Cardiovascular:  No chest pain, palpitations, new edema, or calf discomfort  Respiratory:  No pain, hemoptysis, change to breathing  Breast:  No pain, discharge, change in appearance or texture  Gastrointestinal:  No pain, cramping, jaundice, change to eating and bowel habits  Urinary:  No pain, bleeding or change in continence  Genitalia: No pain, bleeding or discharge  Musculoskeletal:  No redness, pain, edema or weakness  Skin:  No pruritus, rash, change to nodules or lesions  Neurologic:  No discomfort, change in mental status, speech, sensory or motor activity  Psychiatric:  No change in concentration or change to affect or mood  Endocrine:  No hot flashes, increased thirst, or change to urine production  Hematologic: No petechiae, ecchymosis or bleeding  Lymphatic:  No lymphadenopathy or lymphedema  Allergy / Immunologic:  No eczema, hives, frequent or recurrent infections    OBJECTIVE        Physical    VITALS:  Patient Vitals for the past 24 hrs:   BP Temp Temp src Pulse Resp SpO2   12/13/24 0302 109/61 -- -- 61 15 97 %   12/13/24 0252 -- -- -- 62 14 99 %   12/13/24 0203 (!) 94/55 -- -- 95 12 97 %   12/13/24 0101 (!) 102/55 -- -- 54 13 99 %   12/13/24 0005 -- -- -- 63 13 100 %   12/13/24 0000 (!) 113/56 98.2 °F (36.8 °C) -- 62 14 99 %   12/12/24 2338 (!) 105/57 98.3 °F (36.8 °C) -- 62 21 97 %   12/12/24 2302 (!) 105/57 98.2 °F (36.8 °C) Oral 65 13 98 %   12/12/24 2259 -- -- -- 65 13 100 %   12/12/24 2202 (!) 100/53 -- -- 62 13 97 %   12/12/24 2102 115/60 -- -- 71 15 
                                      ONCOLOGY HEMATOLOGY CARE PROGRESS NOTE      SUBJECTIVE:    Started on steroids last week. Received rituxan 12/19. Rash and swelling improved. Now on dialysis.S/P [facility] transfusion.     ROS:     Constitutional:  No weight loss, No fever, No chills, No night sweats.  Energy level good.  Eyes:  No impairment or change in vision  ENT / Mouth:  No pain, abnormal ulceration, bleeding, nasal drip or change in voice or hearing  Cardiovascular:  No chest pain, palpitations, new edema, or calf discomfort  Respiratory:  No pain, hemoptysis, change to breathing  Breast:  No pain, discharge, change in appearance or texture  Gastrointestinal:  No pain, cramping, jaundice, change to eating and bowel habits  Urinary:  No pain, bleeding or change in continence  Genitalia: No pain, bleeding or discharge  Musculoskeletal:  No redness, pain, edema or weakness  Skin:  No pruritus, rash, change to nodules or lesions  Neurologic:  No discomfort, change in mental status, speech, sensory or motor activity  Psychiatric:  No change in concentration or change to affect or mood  Endocrine:  No hot flashes, increased thirst, or change to urine production  Hematologic: No petechiae, ecchymosis or bleeding  Lymphatic:  No lymphadenopathy or lymphedema  Allergy / Immunologic:  No eczema, hives, frequent or recurrent infections    OBJECTIVE        Physical    VITALS:  Patient Vitals for the past 24 hrs:   BP Temp Temp src Pulse Resp SpO2   12/24/24 0807 -- -- -- -- -- 97 %   12/24/24 0634 (!) 141/79 97.5 °F (36.4 °C) Oral 75 16 98 %   12/24/24 0151 -- -- -- -- 16 --   12/23/24 2321 137/77 97.7 °F (36.5 °C) Oral 74 16 94 %   12/23/24 2123 135/78 97.4 °F (36.3 °C) Oral 74 16 96 %   12/23/24 2113 -- -- -- -- 16 --   12/23/24 2037 136/82 97.7 °F (36.5 °C) Oral 74 16 98 %   12/23/24 2036 -- -- -- -- -- 96 %   12/23/24 1937 (!) 144/81 97.7 °F (36.5 °C) Oral 72 16 98 %   12/23/24 9232 -- -- -- -- 15 --   12/23/24 6149 
        Nephrology Progress Note   [email or web address]      Sub/interval history  Patient complains of feeling tired  She is receiving rituximab infusion    HD on 12/18 with 1.5 L net UF, postweight 69.8 kg from bed scale  HD on 12/16 with 2 L net UF, postweight 64.8 kg    ROS: No chest pain/fever/nausea/vomiting  PSFH: No visitor    Scheduled Meds:   senna  1 tablet Oral Nightly    predniSONE  60 mg Oral Daily    sulfamethoxazole-trimethoprim  0.5 tablet Oral Once per day on Monday Wednesday Friday    oyster shell calcium w/D  1 tablet Oral Daily    ipratropium 0.5 mg-albuterol 2.5 mg  1 Dose Inhalation 4x Daily RT    pantoprazole (PROTONIX) 40 mg in sodium chloride (PF) 0.9 % 10 mL injection  40 mg IntraVENous Daily    piperacillin-tazobactam  3,375 mg IntraVENous Q12H    busPIRone  10 mg Oral BID    folic acid  1 mg Oral Daily    midodrine  10 mg Oral TID WC    sodium chloride flush  5-40 mL IntraVENous 2 times per day    [Held by provider] heparin (porcine)  5,000 Units SubCUTAneous 3 times per day    polyethylene glycol  17 g Oral BID    lidocaine  1 patch TransDERmal Daily     Continuous Infusions:   sodium chloride      sodium chloride      sodium chloride       PRN Meds:.sodium chloride, HYDROcodone 5 mg - acetaminophen, perflutren lipid microspheres, benzonatate, HYDROcodone homatropine, heparin (porcine), sodium chloride, sodium chloride flush, sodium chloride, ondansetron **OR** ondansetron, acetaminophen **OR** acetaminophen, chapstick, albuterol sulfate HFA    Objective/     Vitals:    12/19/24 1400 12/19/24 1430 12/19/24 1500 12/19/24 1530   BP: (!) 148/81 (!) 156/81 (!) 155/87 (!) 153/81   Pulse: 75 68 86 73   Resp: 18 18 18 16   Temp: 98 °F (36.7 °C) 98 °F (36.7 °C) 98.1 °F (36.7 °C) 98.1 °F (36.7 °C)   TempSrc: Axillary Axillary Axillary Axillary   SpO2: 96% 96% 92% 96%   Weight:       Height:         24HR INTAKE/OUTPUT:    Intake/Output Summary (Last 24 hours) at 12/19/2024 1552  Last data filed at 
        Nephrology Progress Note   [email or web address]      Sub/interval history    Patient says bowels are now moving.    Complains about not getting any rest  Due for dialysis today  No signs of improving kidney function       ROS: No chest pain/fever/nausea/vomiting  PSFH: No visitor    Scheduled Meds:   ciprofloxacin  500 mg Oral Daily    ipratropium 0.5 mg-albuterol 2.5 mg  1 Dose Inhalation TID RT    senna  1 tablet Oral Nightly    predniSONE  60 mg Oral Daily    sulfamethoxazole-trimethoprim  0.5 tablet Oral Once per day on Monday Wednesday Friday    oyster shell calcium w/D  1 tablet Oral Daily    pantoprazole (PROTONIX) 40 mg in sodium chloride (PF) 0.9 % 10 mL injection  40 mg IntraVENous Daily    busPIRone  10 mg Oral BID    folic acid  1 mg Oral Daily    midodrine  10 mg Oral TID WC    sodium chloride flush  5-40 mL IntraVENous 2 times per day    [Held by provider] heparin (porcine)  5,000 Units SubCUTAneous 3 times per day    polyethylene glycol  17 g Oral BID    lidocaine  1 patch TransDERmal Daily     Continuous Infusions:   sodium chloride      sodium chloride       PRN Meds:.sodium chloride, albumin human 25%, HYDROcodone 5 mg - acetaminophen, perflutren lipid microspheres, benzonatate, HYDROcodone homatropine, heparin (porcine), sodium chloride flush, sodium chloride, ondansetron **OR** ondansetron, acetaminophen **OR** acetaminophen, chapstick, albuterol sulfate HFA    Objective/     Vitals:    12/24/24 0634 12/24/24 0807 12/24/24 1052 12/24/24 1129   BP: (!) 141/79   (!) 143/73   Pulse: 75   69   Resp: 16  16 16   Temp: 97.5 °F (36.4 °C)   97.7 °F (36.5 °C)   TempSrc: Oral   Oral   SpO2: 98% 97%     Weight:       Height:         24HR INTAKE/OUTPUT:    Intake/Output Summary (Last 24 hours) at 12/24/2024 1204  Last data filed at 12/24/2024 0928  Gross per 24 hour   Intake 2188.62 ml   Output 0 ml   Net 2188.62 ml     Gen: alert, awake  Neck: No JVD  Skin: Unremarkable  Cardiovascular:  S1, S2 without 
        Nephrology Progress Note   [email or web address]      Sub/interval history  Feels bad.  Says she is in pain and having severe nausea.  Feels like she is too sick  HD on 12/22 with UF goal adjusted to 1 L based on [employer]      ROS: No chest pain/fever/nausea/vomiting  PSFH: No visitor    Scheduled Meds:   ciprofloxacin  500 mg Oral Daily    ipratropium 0.5 mg-albuterol 2.5 mg  1 Dose Inhalation TID RT    senna  1 tablet Oral Nightly    predniSONE  60 mg Oral Daily    sulfamethoxazole-trimethoprim  0.5 tablet Oral Once per day on Monday Wednesday Friday    oyster shell calcium w/D  1 tablet Oral Daily    pantoprazole (PROTONIX) 40 mg in sodium chloride (PF) 0.9 % 10 mL injection  40 mg IntraVENous Daily    busPIRone  10 mg Oral BID    folic acid  1 mg Oral Daily    midodrine  10 mg Oral TID WC    sodium chloride flush  5-40 mL IntraVENous 2 times per day    [Held by provider] heparin (porcine)  5,000 Units SubCUTAneous 3 times per day    polyethylene glycol  17 g Oral BID    lidocaine  1 patch TransDERmal Daily     Continuous Infusions:   sodium chloride       PRN Meds:.albumin human 25%, HYDROcodone 5 mg - acetaminophen, perflutren lipid microspheres, benzonatate, HYDROcodone homatropine, heparin (porcine), sodium chloride flush, sodium chloride, ondansetron **OR** ondansetron, acetaminophen **OR** acetaminophen, chapstick, albuterol sulfate HFA    Objective/     Vitals:    12/23/24 0350 12/23/24 0652 12/23/24 0745 12/23/24 0901   BP: (!) 144/70 139/76     Pulse: 83 76     Resp: 16 16  16   Temp: 97.6 °F (36.4 °C) 97.4 °F (36.3 °C)     TempSrc: Oral Axillary     SpO2: 92% 94% 98%    Weight:       Height:         24HR INTAKE/OUTPUT:    Intake/Output Summary (Last 24 hours) at 12/23/2024 1016  Last data filed at 12/22/2024 2145  Gross per 24 hour   Intake 584 ml   Output --   Net 584 ml     Gen: alert, awake  Neck: No JVD  Skin: Unremarkable  Cardiovascular:  S1, S2 without m/r/g   Respiratory: CTA B without w/r/r; 
        Nephrology Progress Note   [email or web address]      Sub/interval history  Resting, patient anxious about several health issues and kidney related questions were answered in presence of family members  Bronchoscopy 12/17    HD on 12/16 with 2 L net UF, postweight 64.8 kg    ROS: No chest pain/fever/nausea/vomiting  PSFH: + visitor    Scheduled Meds:   ipratropium 0.5 mg-albuterol 2.5 mg  1 Dose Inhalation 4x Daily RT    methylPREDNISolone  1,000 mg IntraVENous Daily    pantoprazole (PROTONIX) 40 mg in sodium chloride (PF) 0.9 % 10 mL injection  40 mg IntraVENous Daily    piperacillin-tazobactam  3,375 mg IntraVENous Q12H    busPIRone  10 mg Oral BID    folic acid  1 mg Oral Daily    midodrine  10 mg Oral TID WC    sodium chloride flush  5-40 mL IntraVENous 2 times per day    [Held by provider] heparin (porcine)  5,000 Units SubCUTAneous 3 times per day    polyethylene glycol  17 g Oral BID    lidocaine  1 patch TransDERmal Daily     Continuous Infusions:   sodium chloride      sodium chloride      sodium chloride       PRN Meds:.sodium chloride, HYDROcodone 5 mg - acetaminophen, perflutren lipid microspheres, benzonatate, HYDROcodone homatropine, heparin (porcine), sodium chloride, sodium chloride flush, sodium chloride, ondansetron **OR** ondansetron, acetaminophen **OR** acetaminophen, chapstick, albuterol sulfate HFA    Objective/     Vitals:    12/17/24 1001 12/17/24 1030 12/17/24 1057 12/17/24 1124   BP:  128/68 (!) 140/72 (!) 141/77   Pulse:  81 82 79   Resp: 16 16 16 19   Temp:  97.7 °F (36.5 °C) 98.2 °F (36.8 °C) 97.1 °F (36.2 °C)   TempSrc:  Oral Oral Temporal   SpO2:  92% 91% 92%   Weight:       Height:         24HR INTAKE/OUTPUT:    Intake/Output Summary (Last 24 hours) at 12/17/2024 1144  Last data filed at 12/17/2024 0826  Gross per 24 hour   Intake 447.66 ml   Output --   Net 447.66 ml     Gen: alert, awake  Neck: No JVD  Skin: Unremarkable  Cardiovascular:  S1, S2 without m/r/g   Respiratory: CTA B 
        Nephrology Progress Note   [email or web address]      Sub/interval history  Patient feels slightly better than last week    HD on 12/16    ROS: No chest pain/fever/nausea/vomiting  PSFH: No visitor    Scheduled Meds:   [START ON 12/17/2024] ipratropium 0.5 mg-albuterol 2.5 mg  1 Dose Inhalation 4x Daily RT    pantoprazole (PROTONIX) 40 mg in sodium chloride (PF) 0.9 % 10 mL injection  40 mg IntraVENous Daily    piperacillin-tazobactam  3,375 mg IntraVENous Q12H    busPIRone  10 mg Oral BID    folic acid  1 mg Oral Daily    midodrine  10 mg Oral TID WC    methylPREDNISolone  125 mg IntraVENous Daily    sodium chloride flush  5-40 mL IntraVENous 2 times per day    [Held by provider] heparin (porcine)  5,000 Units SubCUTAneous 3 times per day    polyethylene glycol  17 g Oral BID    lidocaine  1 patch TransDERmal Daily     Continuous Infusions:   sodium chloride      sodium chloride       PRN Meds:.HYDROcodone 5 mg - acetaminophen, perflutren lipid microspheres, benzonatate, HYDROcodone homatropine, heparin (porcine), sodium chloride, sodium chloride flush, sodium chloride, ondansetron **OR** ondansetron, polyethylene glycol, acetaminophen **OR** acetaminophen, chapstick, albuterol sulfate HFA    Objective/     Vitals:    12/16/24 1051 12/16/24 1057 12/16/24 1415 12/16/24 1514   BP: (!) 149/88  (!) 146/84    Pulse:   82    Resp:  18 16    Temp:   98 °F (36.7 °C)    TempSrc:       SpO2:    95%   Weight: 66.7 kg (147 lb)      Height: 1.651 m (5' 5\")        24HR INTAKE/OUTPUT:    Intake/Output Summary (Last 24 hours) at 12/16/2024 1616  Last data filed at 12/16/2024 0906  Gross per 24 hour   Intake 120 ml   Output 25 ml   Net 95 ml     Gen: alert, awake  Neck: No JVD  Skin: Unremarkable  Cardiovascular:  S1, S2 without m/r/g   Respiratory: CTA B without w/r/r; tracheostomy in situ   Abdomen:  soft, nt, nd,   Extremities: Trace lower extremity edema  Neuro/Psy: AAoriented times 3 ; moves all 4 ext    Data/  Recent Labs     
        Nephrology Progress Note   [email or web address]      Sub/interval history  Patient complains of pain after  Stable  Renal biopsy in 12/20  HD on 12/20 later this evening    HD on 12/18 with 1.5 L net UF, postweight 69.8 kg from bed scale  HD on 12/16 with 2 L net UF, postweight 64.8 kg    ROS: No chest pain/fever/nausea/vomiting  PSFH: No visitor    Scheduled Meds:   ipratropium 0.5 mg-albuterol 2.5 mg  1 Dose Inhalation TID RT    sodium chloride flush  5-40 mL IntraVENous 2 times per day    senna  1 tablet Oral Nightly    predniSONE  60 mg Oral Daily    sulfamethoxazole-trimethoprim  0.5 tablet Oral Once per day on Monday Wednesday Friday    oyster shell calcium w/D  1 tablet Oral Daily    pantoprazole (PROTONIX) 40 mg in sodium chloride (PF) 0.9 % 10 mL injection  40 mg IntraVENous Daily    piperacillin-tazobactam  3,375 mg IntraVENous Q12H    busPIRone  10 mg Oral BID    folic acid  1 mg Oral Daily    midodrine  10 mg Oral TID WC    sodium chloride flush  5-40 mL IntraVENous 2 times per day    [Held by provider] heparin (porcine)  5,000 Units SubCUTAneous 3 times per day    polyethylene glycol  17 g Oral BID    lidocaine  1 patch TransDERmal Daily     Continuous Infusions:   sodium chloride      sodium chloride      sodium chloride      sodium chloride      sodium chloride       PRN Meds:.sodium chloride, naloxone 0.4 mg in 10 mL sodium chloride syringe, sodium chloride flush, sodium chloride, HYDROmorphone, HYDROmorphone, ondansetron, diphenhydrAMINE, labetalol, sodium chloride, HYDROcodone 5 mg - acetaminophen, perflutren lipid microspheres, benzonatate, HYDROcodone homatropine, heparin (porcine), sodium chloride, sodium chloride flush, sodium chloride, ondansetron **OR** ondansetron, acetaminophen **OR** acetaminophen, chapstick, albuterol sulfate HFA    Objective/     Vitals:    12/20/24 1510 12/20/24 1526 12/20/24 1546 12/20/24 1614   BP:  (!) 142/79 (!) 155/73    Pulse:  59 57    Resp: 17 17 14 14   Temp:  
        Nephrology Progress Note   [email or web address]      Sub/interval history  Resting  Patient tolerated bronchoscopy well on 12/17    HD on 12/18 with 1.5 L net UF, postweight 69.8 kg from bed scale  HD on 12/16 with 2 L net UF, postweight 64.8 kg    ROS: No chest pain/fever/nausea/vomiting  PSFH: + visitor    Scheduled Meds:   senna  1 tablet Oral Nightly    sulfamethoxazole-trimethoprim  0.5 tablet Oral Once per day on Monday Wednesday Friday    oyster shell calcium w/D  1 tablet Oral Daily    ipratropium 0.5 mg-albuterol 2.5 mg  1 Dose Inhalation 4x Daily RT    pantoprazole (PROTONIX) 40 mg in sodium chloride (PF) 0.9 % 10 mL injection  40 mg IntraVENous Daily    piperacillin-tazobactam  3,375 mg IntraVENous Q12H    busPIRone  10 mg Oral BID    folic acid  1 mg Oral Daily    midodrine  10 mg Oral TID WC    sodium chloride flush  5-40 mL IntraVENous 2 times per day    [Held by provider] heparin (porcine)  5,000 Units SubCUTAneous 3 times per day    polyethylene glycol  17 g Oral BID    lidocaine  1 patch TransDERmal Daily     Continuous Infusions:   sodium chloride      sodium chloride      sodium chloride       PRN Meds:.sodium chloride, HYDROcodone 5 mg - acetaminophen, perflutren lipid microspheres, benzonatate, HYDROcodone homatropine, heparin (porcine), sodium chloride, sodium chloride flush, sodium chloride, ondansetron **OR** ondansetron, acetaminophen **OR** acetaminophen, chapstick, albuterol sulfate HFA    Objective/     Vitals:    12/18/24 1028 12/18/24 1045 12/18/24 1111 12/18/24 1132   BP: (!) 148/83 134/85     Pulse: 84 85 84    Resp: 16 18 18 18   Temp: 97.5 °F (36.4 °C) 97.5 °F (36.4 °C)     TempSrc:  Axillary     SpO2:   94%    Weight: 69.8 kg (153 lb 14.1 oz)      Height:         24HR INTAKE/OUTPUT:    Intake/Output Summary (Last 24 hours) at 12/18/2024 1505  Last data filed at 12/18/2024 1207  Gross per 24 hour   Intake 430.84 ml   Output 0 ml   Net 430.84 ml     Gen: alert, awake  Neck: No 
   12/19/24 1100   RT Protocol   History Pulmonary Disease 2   Respiratory pattern 0   Breath sounds 2   Cough 3   Indications for Bronchodilator Therapy Decreased or absent breath sounds   Bronchodilator Assessment Score 7       
   12/19/24 2026   RT Protocol   History Pulmonary Disease 2   Respiratory pattern 0   Breath sounds 2   Cough 0   Indications for Bronchodilator Therapy Decreased or absent breath sounds   Bronchodilator Assessment Score 4     RT Inhaler-Nebulizer Bronchodilator Protocol Note    There is a bronchodilator order in the chart from a provider indicating to follow the RT Bronchodilator Protocol and there is an “Initiate RT Inhaler-Nebulizer Bronchodilator Protocol” order as well (see protocol at bottom of note).    CXR Findings:  No results found.    The findings from the last RT Protocol Assessment were as follows:   History Pulmonary Disease: Chronic pulmonary disease  Respiratory Pattern: Regular pattern and RR 12-20 bpm  Breath Sounds: Slightly diminished and/or crackles  Cough: Strong, spontaneous, non-productive  Indication for Bronchodilator Therapy: Decreased or absent breath sounds  Bronchodilator Assessment Score: 4    Aerosolized bronchodilator medication orders have been revised according to the RT Inhaler-Nebulizer Bronchodilator Protocol below.    Respiratory Therapist to perform RT Therapy Protocol Assessment initially then follow the protocol.  Repeat RT Therapy Protocol Assessment PRN for score 0-3 or on second treatment, BID, and PRN for scores above 3.    No Indications - adjust the frequency to every 6 hours PRN wheezing or bronchospasm, if no treatments needed after 48 hours then discontinue using Per Protocol order mode.     If indication present, adjust the RT bronchodilator orders based on the Bronchodilator Assessment Score as indicated below.  Use Inhaler orders unless patient has one or more of the following: on home nebulizer, not able to hold breath for 10 seconds, is not alert and oriented, cannot activate and use MDI correctly, or respiratory rate 25 breaths per minute or more, then use the equivalent nebulizer order(s) with same Frequency and PRN reasons based on the score.  If a patient 
   12/20/24 0700   RT Protocol   History Pulmonary Disease 2   Respiratory pattern 0   Breath sounds 2   Cough 0   Indications for Bronchodilator Therapy Decreased or absent breath sounds   Bronchodilator Assessment Score 4       
   12/21/24 0736   RT Protocol   History Pulmonary Disease 2   Respiratory pattern 0   Breath sounds 6   Cough 0   Indications for Bronchodilator Therapy On home bronchodilators   Bronchodilator Assessment Score 8       
   12/22/24 0747   RT Protocol   History Pulmonary Disease 2   Respiratory pattern 0   Breath sounds 6   Cough 0   Indications for Bronchodilator Therapy On home bronchodilators   Bronchodilator Assessment Score 8       
   12/22/24 2000   RT Protocol   History Pulmonary Disease 2   Respiratory pattern 0   Breath sounds 2   Cough 0   Indications for Bronchodilator Therapy On home bronchodilators   Bronchodilator Assessment Score 4     RT Inhaler-Nebulizer Bronchodilator Protocol Note    There is a bronchodilator order in the chart from a provider indicating to follow the RT Bronchodilator Protocol and there is an “Initiate RT Inhaler-Nebulizer Bronchodilator Protocol” order as well (see protocol at bottom of note).    CXR Findings:  No results found.    The findings from the last RT Protocol Assessment were as follows:   History Pulmonary Disease: Chronic pulmonary disease  Respiratory Pattern: Regular pattern and RR 12-20 bpm  Breath Sounds: Slightly diminished and/or crackles  Cough: Strong, spontaneous, non-productive  Indication for Bronchodilator Therapy: On home bronchodilators  Bronchodilator Assessment Score: 4    Aerosolized bronchodilator medication orders have been revised according to the RT Inhaler-Nebulizer Bronchodilator Protocol below.    Respiratory Therapist to perform RT Therapy Protocol Assessment initially then follow the protocol.  Repeat RT Therapy Protocol Assessment PRN for score 0-3 or on second treatment, BID, and PRN for scores above 3.    No Indications - adjust the frequency to every 6 hours PRN wheezing or bronchospasm, if no treatments needed after 48 hours then discontinue using Per Protocol order mode.     If indication present, adjust the RT bronchodilator orders based on the Bronchodilator Assessment Score as indicated below.  Use Inhaler orders unless patient has one or more of the following: on home nebulizer, not able to hold breath for 10 seconds, is not alert and oriented, cannot activate and use MDI correctly, or respiratory rate 25 breaths per minute or more, then use the equivalent nebulizer order(s) with same Frequency and PRN reasons based on the score.  If a patient is on this 
   12/23/24 0700   RT Protocol   History Pulmonary Disease 2   Respiratory pattern 0   Breath sounds 2   Cough 3   Indications for Bronchodilator Therapy Decreased or absent breath sounds   Bronchodilator Assessment Score 7       
   12/24/24 0800   RT Protocol   History Pulmonary Disease 2   Respiratory pattern 2   Breath sounds 2   Cough 2   Indications for Bronchodilator Therapy Decreased or absent breath sounds   Bronchodilator Assessment Score 8       
  Physician Progress Note      PATIENT:               JOSEPH PICHARDO  CSN #:                  [id number]  :                       1955  ADMIT DATE:       12/10/2024 6:14 PM  DISCH DATE:  RESPONDING  PROVIDER #:        Jonathan Gould MD          QUERY TEXT:    Patient admitted with dehydration. Noted documentation of acute respiratory   failure in Pulmonology progress notes throughout the chart.  Documentation of   no respiratory distress and pulmonary effort normal per ED. There is   documentation of no distress throughout the chart.  Documentation of no   increased work of breathing per oncology throughout the chart.  Pt is on home   O2 @ baseline 3L. On supplemental oxygen @ 4L.  Was placed on 15L Mask before,   during and after bronch, then weaned back down to 4L  In order to support the   diagnosis of acute respiratory failure, please incl    The medical record reflects the following:  Risk Factors: 69 y.o. female with hx of laryngeal cancer s/p tracheostomy  Clinical Indicators:  Noted documentation of acute respiratory failure in   Pulmonology progress notes throughout the chart.  Documentation of no   respiratory distress and pulmonary effort normal per ED. There is   documentation of no distress throughout the chart.  Documentation of no   increased work of breathing per oncology throughout the chart.  Treatment: pulmonary consult, bronchoscopy with BAL, supplemental oxygen @ 4L.    Was on home oxygen @ 3L baseline  Options provided:  -- Acute Respiratory Failure ruled out after study and Chronic Hypoxic   Respiratory Failure confirmed  -- Acute Respiratory Failure as evidenced by, Please document evidence.  -- Other - I will add my own diagnosis  -- Disagree - Not applicable / Not valid  -- Disagree - Clinically unable to determine / Unknown  -- Refer to Clinical Documentation Reviewer    PROVIDER RESPONSE TEXT:    Acute Respiratory Failure ruled out after study and Chronic Hypoxic 
  Physician Progress Note      PATIENT:               JOSEPH PICHARDO  CSN #:                  [id number]  :                       1955  ADMIT DATE:       12/10/2024 6:14 PM  DISCH DATE:  RESPONDING  PROVIDER #:        Juan Kelsey MD          QUERY TEXT:    Patient admitted with dehydration. Noted to have met ASPEN criteria for severe   malnutrition per  Dietary consult assessment.  Severe malnutrition only   listed under active problem list w/o current date, not under assessment and   plan.  If possible, please document in progress notes and discharge summary if   you are evaluating and /or treating any of the following:    The medical record reflects the following:  Risk Factors: Per Dietary: patient is severely malnourished r/t inadequate   protein-energy intake, inability to care for herself, and poor appetite AEB   poor po intake PTA (< 50% x 5+ days), lab values, and patient reported   barriers; she is at risk for further compromise d/t NPO status, weight loss   PTA, and inability to care for herself/failure to thrive;  Clinical Indicators: Per Dietary: Energy Intake:  50% or less of estimated   energy requirements for 5 or more days  Weight Loss:  Greater than 7.5% over 3 months (-18# or 10.8% weight loss since   24)  Fluid Accumulation:  Mild Extremities (BLE + 2 edema)  Treatment: Dietary consult, I&Os wt    ASPEN Criteria:    https://aspenjournals.onlinelibrary.anna.com/doi/full/10.1177/900401856826784  5  Options provided:  -- Protein calorie malnutrition severe  -- Other - I will add my own diagnosis  -- Disagree - Not applicable / Not valid  -- Disagree - Clinically unable to determine / Unknown  -- Refer to Clinical Documentation Reviewer    PROVIDER RESPONSE TEXT:    This patient has severe protein calorie malnutrition.    Query created by: Danielito Alfred on 2024 8:46 PM      Electronically signed by:  Juan Kelsey MD 2024 1:22 PM          
  Pulmonary & Critical Care Medicine ICU Progress Note    CC: Hyponatremia    Events of Last 24 hours:   Appears comfortable  Post HD today  No hemoptysis  O2 is trending down 15----> 4L           Intake/Output Summary (Last 24 hours) at 12/18/2024 0747  Last data filed at 12/17/2024 2328  Gross per 24 hour   Intake 744.51 ml   Output 0 ml   Net 744.51 ml         IV:   sodium chloride      sodium chloride      sodium chloride       Vitals:  /86   Pulse 72   Temp 97.8 °F (36.6 °C) (Oral)   Resp 18   Ht 1.651 m (5' 5\")   Wt 64.8 kg (142 lb 13.7 oz)   SpO2 96%   BMI 23.77 kg/m²   on 4L  Constitutional:  No acute distress.   Eyes: PERRL. Conjunctivae anicteric.   ENT: Normal nose. Normal tongue.    Neck:  Trachea is midline.   Respiratory: No accessory muscle usage.   No wheezes. No rales.  Few rhonchi.  Cardiovascular: Normal S1S2. No digit clubbing. No digit cyanosis. No LE edema.    Gastrointestinal: No mass palpated. No tenderness palpated.   Skin:  purpuric rash on her legs more than her hands and arms   Psychiatric: No anxiety or Agitation. Alert and Oriented to person, place and time.    Scheduled Meds:   senna  1 tablet Oral Nightly    sulfamethoxazole-trimethoprim  0.5 tablet Oral Once per day on Monday Wednesday Friday    oyster shell calcium w/D  1 tablet Oral Daily    ipratropium 0.5 mg-albuterol 2.5 mg  1 Dose Inhalation 4x Daily RT    methylPREDNISolone  1,000 mg IntraVENous Daily    pantoprazole (PROTONIX) 40 mg in sodium chloride (PF) 0.9 % 10 mL injection  40 mg IntraVENous Daily    piperacillin-tazobactam  3,375 mg IntraVENous Q12H    busPIRone  10 mg Oral BID    folic acid  1 mg Oral Daily    midodrine  10 mg Oral TID WC    sodium chloride flush  5-40 mL IntraVENous 2 times per day    [Held by provider] heparin (porcine)  5,000 Units SubCUTAneous 3 times per day    polyethylene glycol  17 g Oral BID    lidocaine  1 patch TransDERmal Daily     PRN Meds:  sodium chloride, HYDROcodone 5 mg - 
  Pulmonary & Critical Care Medicine ICU Progress Note    CC: Hyponatremia    Events of Last 24 hours:   Hemoptysis x 2 , bright red blood she said   O2 is trending down 15----> 4L           Intake/Output Summary (Last 24 hours) at 12/20/2024 0705  Last data filed at 12/19/2024 0827  Gross per 24 hour   Intake 0 ml   Output --   Net 0 ml         IV:   sodium chloride      sodium chloride      sodium chloride       Vitals:  BP (!) 158/86   Pulse 80   Temp 97.3 °F (36.3 °C) (Oral)   Resp 16   Ht 1.651 m (5' 5\")   Wt 69.8 kg (153 lb 14.1 oz)   SpO2 96%   BMI 25.61 kg/m²   on 4L  Constitutional:  No acute distress.   Eyes: PERRL. Conjunctivae anicteric.   ENT: Normal nose. Normal tongue.    Neck:  Trachea is midline.   Respiratory: No accessory muscle usage.   No wheezes. + rales.  Few rhonchi.  Cardiovascular: Normal S1S2. No digit clubbing. No digit cyanosis. No LE edema.    Gastrointestinal: No mass palpated. No tenderness palpated.   Skin:  purpuric rash on her legs more than her hands and arms -much improving  Psychiatric: No anxiety or Agitation. Alert and Oriented to person, place and time.    Scheduled Meds:   senna  1 tablet Oral Nightly    predniSONE  60 mg Oral Daily    sulfamethoxazole-trimethoprim  0.5 tablet Oral Once per day on Monday Wednesday Friday    oyster shell calcium w/D  1 tablet Oral Daily    ipratropium 0.5 mg-albuterol 2.5 mg  1 Dose Inhalation 4x Daily RT    pantoprazole (PROTONIX) 40 mg in sodium chloride (PF) 0.9 % 10 mL injection  40 mg IntraVENous Daily    piperacillin-tazobactam  3,375 mg IntraVENous Q12H    busPIRone  10 mg Oral BID    folic acid  1 mg Oral Daily    midodrine  10 mg Oral TID WC    sodium chloride flush  5-40 mL IntraVENous 2 times per day    [Held by provider] heparin (porcine)  5,000 Units SubCUTAneous 3 times per day    polyethylene glycol  17 g Oral BID    lidocaine  1 patch TransDERmal Daily     PRN Meds:  sodium chloride, HYDROcodone 5 mg - acetaminophen, 
  Pulmonary & Critical Care Medicine ICU Progress Note    CC: Hyponatremia    Events of Last 24 hours:   Intermittent hemoptysis, dark and bright red 3-4 times   Stable O2 O2 is trending down 15----> 4L   Anemia on a.m. lab      Intake/Output Summary (Last 24 hours) at 12/21/2024 0756  Last data filed at 12/20/2024 1730  Gross per 24 hour   Intake 602.5 ml   Output 130 ml   Net 472.5 ml       IV:   sodium chloride      sodium chloride      sodium chloride      sodium chloride      sodium chloride      sodium chloride       Vitals:  /64   Pulse 62   Temp (!) 96.7 °F (35.9 °C) (Oral)   Resp 16   Ht 1.651 m (5' 5\")   Wt 71.4 kg (157 lb 6.5 oz)   SpO2 98%   BMI 26.19 kg/m²   on 4L  Constitutional:  No acute distress.   Eyes: PERRL. Conjunctivae anicteric.   ENT: Normal nose. Normal tongue.    Neck:  Trachea is midline.   Respiratory: No accessory muscle usage.   No wheezes. + rales.  Few rhonchi.  Cardiovascular: Normal S1S2. No digit clubbing. No digit cyanosis. No LE edema.    Gastrointestinal: No mass palpated. No tenderness palpated.   Skin:  purpuric rash on her legs more than her hands and arms -much improving  Psychiatric: No anxiety or Agitation. Alert and Oriented to person, place and time.    Scheduled Meds:   ipratropium 0.5 mg-albuterol 2.5 mg  1 Dose Inhalation TID RT    sodium chloride flush  5-40 mL IntraVENous 2 times per day    senna  1 tablet Oral Nightly    predniSONE  60 mg Oral Daily    sulfamethoxazole-trimethoprim  0.5 tablet Oral Once per day on Monday Wednesday Friday    oyster shell calcium w/D  1 tablet Oral Daily    pantoprazole (PROTONIX) 40 mg in sodium chloride (PF) 0.9 % 10 mL injection  40 mg IntraVENous Daily    piperacillin-tazobactam  3,375 mg IntraVENous Q12H    busPIRone  10 mg Oral BID    folic acid  1 mg Oral Daily    midodrine  10 mg Oral TID WC    sodium chloride flush  5-40 mL IntraVENous 2 times per day    [Held by provider] heparin (porcine)  5,000 Units 
  Pulmonary & Critical Care Medicine ICU Progress Note    CC: Hyponatremia    Events of Last 24 hours:   Stable O2 O2 is trending down 15----> 4L   Intermittent hemoptysis-improving      Intake/Output Summary (Last 24 hours) at 12/22/2024 0839  Last data filed at 12/22/2024 0445  Gross per 24 hour   Intake 2448.67 ml   Output --   Net 2448.67 ml       IV:   sodium chloride      sodium chloride      sodium chloride      sodium chloride      sodium chloride       Vitals:  /74   Pulse 81   Temp 97.2 °F (36.2 °C) (Axillary)   Resp 18   Ht 1.651 m (5' 5\")   Wt 71.4 kg (157 lb 6.5 oz)   SpO2 91%   BMI 26.19 kg/m²   on 4L  Constitutional:  No acute distress.   Eyes: PERRL. Conjunctivae anicteric.   ENT: Normal nose. Normal tongue.    Neck:  Trachea is midline.   Respiratory: No accessory muscle usage.   No wheezes. + rales.  Few rhonchi.  Cardiovascular: Normal S1S2. No digit clubbing. No digit cyanosis. No LE edema.    Gastrointestinal: No mass palpated. No tenderness palpated.   Skin:  purpuric rash on her legs more than her hands and arms -much improving  Psychiatric: No anxiety or Agitation. Alert and Oriented to person, place and time.    Scheduled Meds:   ipratropium 0.5 mg-albuterol 2.5 mg  1 Dose Inhalation TID RT    sodium chloride flush  5-40 mL IntraVENous 2 times per day    senna  1 tablet Oral Nightly    predniSONE  60 mg Oral Daily    sulfamethoxazole-trimethoprim  0.5 tablet Oral Once per day on Monday Wednesday Friday    oyster shell calcium w/D  1 tablet Oral Daily    pantoprazole (PROTONIX) 40 mg in sodium chloride (PF) 0.9 % 10 mL injection  40 mg IntraVENous Daily    piperacillin-tazobactam  3,375 mg IntraVENous Q12H    busPIRone  10 mg Oral BID    folic acid  1 mg Oral Daily    midodrine  10 mg Oral TID WC    sodium chloride flush  5-40 mL IntraVENous 2 times per day    [Held by provider] heparin (porcine)  5,000 Units SubCUTAneous 3 times per day    polyethylene glycol  17 g Oral BID    
  Pulmonary & Critical Care Medicine ICU Progress Note    CC: Hyponatremia    Events of Last 24 hours: sodium improving      IV:   sodium chloride      sodium chloride         Vitals:  /75   Pulse 68   Temp 97.5 °F (36.4 °C) (Oral)   Resp 16   Ht 1.651 m (5' 5\")   Wt 68.4 kg (150 lb 11.2 oz)   SpO2 96%   BMI 25.08 kg/m²   on 3L  Constitutional:  No acute distress.   Eyes: PERRL. Conjunctivae anicteric.   ENT: Normal nose. Normal tongue.    Neck:  Trachea is midline.   Respiratory: No accessory muscle usage.   No wheezes. No rales. No Rhonchi.  Cardiovascular: Normal S1S2. No digit clubbing. No digit cyanosis. No LE edema.    Gastrointestinal: No mass palpated. No tenderness palpated.   Skin:  purpuric rash on her legs more than her hands and arms   Psychiatric: No anxiety or Agitation. Alert and Oriented to person, place and time.    Scheduled Meds:   albuterol sulfate HFA  2 puff Inhalation 4x Daily RT    busPIRone  10 mg Oral BID    folic acid  1 mg Oral Daily    midodrine  10 mg Oral TID WC    methylPREDNISolone  125 mg IntraVENous Daily    sodium chloride flush  5-40 mL IntraVENous 2 times per day    [Held by provider] heparin (porcine)  5,000 Units SubCUTAneous 3 times per day    mupirocin   Each Nostril BID    polyethylene glycol  17 g Oral BID    lidocaine  1 patch TransDERmal Daily     PRN Meds:  HYDROcodone 5 mg - acetaminophen, sodium chloride, sodium chloride flush, sodium chloride, ondansetron **OR** ondansetron, polyethylene glycol, acetaminophen **OR** acetaminophen, chapstick, albuterol sulfate HFA    Results:  CBC:   Recent Labs     12/11/24  0500 12/11/24  0903 12/11/24  1224 12/12/24  1744 12/13/24  0410   WBC 6.2  --   --  8.3 7.1   HGB 6.8*  --  7.6* 6.9* 7.7*   HCT 19.7*   < > 22.6* 20.7* 23.0*   MCV 86.3  --   --  87.3 86.5     --   --  159 157    < > = values in this interval not displayed.     BMP:   Recent Labs     12/12/24  2352 12/13/24  0410 12/13/24  1150   * 
  Pulmonary & Critical Care Medicine ICU Progress Note    CC: Hyponatremia    Events of Last 24 hours: sodium improving  Patient notes that she has had blood in her tissue after blowing her nose since admission a week ago.  Just yesterday and today she has had streaks of blood in her sputum when she coughs.  No increasing shortness of breath.  Overnight her baseline oxygen requirement increased from her normal  2LPM and is now at 6LPM.  Her chronic cough is not changed over the course of the week.  Has dark brown sputum.  Respiratory culture was sent.    IV:   sodium chloride      sodium chloride         Vitals:  /62   Pulse 82   Temp 97.3 °F (36.3 °C) (Oral)   Resp 18   Ht 1.651 m (5' 5\")   Wt 68.4 kg (150 lb 12.7 oz)   SpO2 91%   BMI 25.09 kg/m²   on 3L  Constitutional:  No acute distress.   Eyes: PERRL. Conjunctivae anicteric.   ENT: Normal nose. Normal tongue.    Neck:  Trachea is midline.   Respiratory: No accessory muscle usage.   No wheezes. No rales. No Rhonchi.  Cardiovascular: Normal S1S2. No digit clubbing. No digit cyanosis. No LE edema.    Gastrointestinal: No mass palpated. No tenderness palpated.   Skin:  purpuric rash on her legs more than her hands and arms   Psychiatric: No anxiety or Agitation. Alert and Oriented to person, place and time.    Scheduled Meds:   pantoprazole  40 mg Oral QAM AC    albuterol sulfate HFA  2 puff Inhalation 4x Daily RT    busPIRone  10 mg Oral BID    folic acid  1 mg Oral Daily    midodrine  10 mg Oral TID WC    methylPREDNISolone  125 mg IntraVENous Daily    sodium chloride flush  5-40 mL IntraVENous 2 times per day    [Held by provider] heparin (porcine)  5,000 Units SubCUTAneous 3 times per day    mupirocin   Each Nostril BID    polyethylene glycol  17 g Oral BID    lidocaine  1 patch TransDERmal Daily     PRN Meds:  benzonatate, HYDROcodone homatropine, heparin (porcine), HYDROcodone 5 mg - acetaminophen, sodium chloride, sodium chloride flush, sodium 
  Speech Language Pathology  Attempt Note     Name: Emily Alegre  : 1955  Medical Diagnosis: Adult failure to thrive [R62.7]  Dehydration [E86.0]  Hypomagnesemia [E83.42]  Hyponatremia [E87.1]  Ileus (HCC) [K56.7]  Right arm weakness [R29.898]  Failure to thrive in adult [R62.7]  Acute renal failure, unspecified acute renal failure type (HCC) [N17.9]  Anemia, unspecified type [D64.9]      SLP attempted to see pt for dysphagia tx. Called [facility] Fajardo to confirm patient would be available at lunch time for treatment. RN reported patient with poor compliance for upright positioning with intake, recent productive cough, concern for choking risk and aspiration. Note team downgraded patient to IDDSI 5 minced/moist last week.  Treatment unable to be completed due to patient gone for dialysis when clinician attempted. SLP to re-attempt as pt's condition and schedule allows. RN aware. No charges.    Thank you,  Emily Wells M.S. [facility]-SLP #[id number]  Speech Language Pathologist      
  Speech Language Pathology  Attempt Note     Name: Emily Alegre  : 1955  Medical Diagnosis: Adult failure to thrive [R62.7]  Dehydration [E86.0]  Hypomagnesemia [E83.42]  Hyponatremia [E87.1]  Ileus (HCC) [K56.7]  Right arm weakness [R29.898]  Failure to thrive in adult [R62.7]  Acute renal failure, unspecified acute renal failure type (HCC) [N17.9]  Anemia, unspecified type [D64.9]      SLP attempted to see pt for dysphagia tx. Order acknowledged and chart reviewed for completion of dysphagia tx. Per RN, pt off floor for procedure with dialysis to follow. SLP to re-attempt as pt's condition and schedule allows. RN aware. No charges.    Thank you,  Adrianne Laguna MA, CCC-SLP  Speech Language Pathologist      
  Speech Language Pathology  Attempt Note     Name: Emily Alegre  : 1955  Medical Diagnosis: Adult failure to thrive [R62.7]  Dehydration [E86.0]  Hypomagnesemia [E83.42]  Hyponatremia [E87.1]  Ileus (HCC) [K56.7]  Right arm weakness [R29.898]  Failure to thrive in adult [R62.7]  Acute renal failure, unspecified acute renal failure type (HCC) [N17.9]  Anemia, unspecified type [D64.9]      SLP attempted to see pt for dysphagia tx. Treatment unable to be completed as pt beginning chemotherapy treatment. SLP to re-attempt as pt's condition and schedule allows. RN aware. No charges.    Thank you,  Aimee Coronel M.A. SLP  Speech-Language Pathologist  [place] Lic #[id number]  [phone]    
  Speech Language Pathology  Attempt Note     Name: Emily Alegre  : 1955  Medical Diagnosis: Adult failure to thrive [R62.7]  Dehydration [E86.0]  Hypomagnesemia [E83.42]  Hyponatremia [E87.1]  Ileus (HCC) [K56.7]  Right arm weakness [R29.898]  Failure to thrive in adult [R62.7]  Acute renal failure, unspecified acute renal failure type (HCC) [N17.9]  Anemia, unspecified type [D64.9]      SLP attempted to see pt for dysphagia tx. Treatment unable to be completed as pt currently in dialysis. SLP to re-attempt as pt's condition and schedule allows. Pt with planned bronchoscopy tomorrow. RN aware. No charges.    Thank you,    Aimee Coronel M.A. SLP  Speech-Language Pathologist  [place] Lic #[id number]  [phone]    
  Speech Language Pathology  Attempt Note     Name: Emily Alegre  : 1955  Medical Diagnosis: Adult failure to thrive [R62.7]  Dehydration [E86.0]  Hypomagnesemia [E83.42]  Hyponatremia [E87.1]  Ileus (HCC) [K56.7]  Right arm weakness [R29.898]  Failure to thrive in adult [R62.7]  Acute renal failure, unspecified acute renal failure type (HCC) [N17.9]  Anemia, unspecified type [D64.9]      SLP attempted to see pt for dysphagia tx. Treatment unable to be completed due to Pt NPO for biopsy procedure. SLP to re-attempt as pt's condition and schedule allows. RN aware. No charges.    Thank you,    Sandra Kang MA CF-SLP   Speech Language Pathologist       
  Speech Language Pathology  Attempt Note     Name: Emily Alegre  : 1955  Medical Diagnosis: Adult failure to thrive [R62.7]  Dehydration [E86.0]  Hypomagnesemia [E83.42]  Hyponatremia [E87.1]  Ileus (HCC) [K56.7]  Right arm weakness [R29.898]  Failure to thrive in adult [R62.7]  Acute renal failure, unspecified acute renal failure type (HCC) [N17.9]  Anemia, unspecified type [D64.9]      SLP attempted to see pt for dysphagia tx. Treatment unable to be completed due to pt currently NPO for bronch this date per notes. SLP to re-attempt as pt's condition and schedule allows. RN aware. No charges.      Lexie Santana M.A. CCC-SLP  Speech Language Pathologist      
  Speech Language Pathology  Swallowing Disorders and Dysphagia    Dysphagia Treatment/Follow-Up Note  [place] [facility] [place]    Instrumentation: Not clinically indicated at this time. Will continue to monitor;  pt just had MBS completed 2024.  Diet recommendation: IDDSI 7 Regular Solids; IDDSI 0 Thin Liquids; Meds crushed in puree as able; pt to self-select ETC solids from broader menu  Risk management: upright for all intake, stay upright for at least 30 mins after intake, small bites/sips, oral care 2-3x/day to reduce adverse affects in the event of aspiration, increase physical mobility as able, alternate bites/sips, slow rate of intake, general GERD precautions, general aspiration precautions, and hold PO and contact SLP if s/s of aspiration or worsening respiratory status develop. Three second bolus hold may decrease risk of bolus misdirection per MBS.     paste diet rec's and risk management here    NAME:Emily Alegre  : 1955 (69 y.o.)   MRN: [id number]  ROOM: [address] [place] [address]  ADMISSION DATE: 12/10/2024  PATIENT DIAGNOSIS(ES): Adult failure to thrive [R62.7]  Dehydration [E86.0]  Hypomagnesemia [E83.42]  Hyponatremia [E87.1]  Ileus (HCC) [K56.7]  Right arm weakness [R29.898]  Failure to thrive in adult [R62.7]  Acute renal failure, unspecified acute renal failure type (HCC) [N17.9]  Anemia, unspecified type [D64.9]  No Known Allergies    DATE ONSET: 12/10/2024    Pain: The patient complains of pain in jaw 10/10, new since BSE and limiting pt's ability to chew without pain       Current Diet: ADULT DIET; Regular      Dysphagia Treatment and Impressions:  Subjective: Pt seen in room at bedside with RN (Luis/Claudia) permission  Noteworthy events reported/Chart Review: No new concerns reported  Patient tolerance to current diet and treatment:Pt states grilled cheese was too chewy to eat. Family present and states pt could eat home-cooked grilled cheese. Family brought meatloaf and mashed 
  Speech Language Pathology  Swallowing Disorders and Dysphagia  Clinical Bedside Swallow Assessment  [place] [facility] [place]    Instrumentation: Not clinically indicated at this time. Will continue to monitor;  pt just had MBS completed 2024.  Diet recommendation: IDDSI 7 Regular Solids; IDDSI 0 Thin Liquids; Meds crushed in puree as able; pt to self-select ETC solids from broader menu  Risk management: upright for all intake, stay upright for at least 30 mins after intake, small bites/sips, oral care 2-3x/day to reduce adverse affects in the event of aspiration, increase physical mobility as able, alternate bites/sips, slow rate of intake, general GERD precautions, general aspiration precautions, and hold PO and contact SLP if s/s of aspiration or worsening respiratory status develop. Three second bolus hold may decrease risk of bolus misdirection per MBS.    NAME:Emily Alegre  : 1955 (69 y.o.)   MRN: [id number]  ROOM: [address] [place] [address]  ADMISSION DATE: 12/10/2024  Chief Complaint   Patient presents with    Dehydration     Pt. Reports dehydration, unable to eat or drink very much, pt. Has history of larynx cancer, pt. Also reports BLE edema, petechiae to entire body. Niece reports that pt. Is unable to take care of herself and needs nursing home placement.      Past Medical History:   Diagnosis Date    Asthma     Chronic bronchiolitis (HCC)     Throat cancer (HCC)      Past Surgical History:   Procedure Laterality Date     SECTION      TRACHEOSTOMY  2024    UTERINE SUSPENSION           DATE ONSET: 12/10/2024    Date of Evaluation: 2024   Evaluating Therapist: ERICKA Rangel    Chart Reviewed: : [x] Yes [] No     Pain: The patient does not complain of pain    RN Ok'd SLP Entry: Yes [x]  Requested Hold []  RN Name: Maria Teresa      Data Review:        Current Diet: Diet NPO    Lab Values:    CBC:  Lab Results   Component Value Date    WBC 6.2 2024    HGB 7.6 (L) 2024    
 Progress Note    Admit Date:  12/10/2024  10    Interval history:  69 y.o. female with a history of laryngeal cancer status post tracheostomy who presented to ED with complaint of dehydration.     reports hx of laryngeal cancer needing surgery with tracheostomy , PEG placement   Completed chemo radiation in 8/24, had trach decannulated and PEG removed last month  F/w [facility] oncology . Went to rehab last month and was dcd to home few weeks ago with progressive weakness and poor appetite    Steroids started for rash  Rash is starting to look better    Subjective:  Ms. Alegre seen up in bed.  Feels about the same today.   Cough is the same today.   Drank ensure this morning.   Rash looks better just since yesterday      12/16- complains of pain all over and asking for Dilaudid. ECHO is being done this am. Will likely need bronchoscopy and kidney biopsy.    12/17- Serine protease 3 antibody is positive.     12/18- seen at HD. Had bronchoscopy on 12/17.  Plans for renal biopsy.      12/19- scheduled for kidney biopsy in am.     12/20- plans for kidney biopsy today.    Objective:   BP (!) 152/76   Pulse 81   Temp 97.9 °F (36.6 °C) (Oral)   Resp 16   Ht 1.651 m (5' 5\")   Wt 69.8 kg (153 lb 14.1 oz)   SpO2 94%   BMI 25.61 kg/m²   No intake or output data in the 24 hours ending 12/20/24 0921      Physical Exam:    General:  elderly female, pale, fatigued and ill appearing  Awake, alert and oriented. Appears to be not in any distress  Mucous Membranes:  Pale , anicteric  Healing small trach opening with no drainage  Neck: No JVD, no carotid bruit, no thyromegaly  Chest:  Clear to auscultation bilaterally,  diminished In bases  Right sided chest port  Cardiovascular:  RRR S1S2 heard, no murmurs or gallops  Abdomen:  Soft, undistended, large ventral hernia reducible  non tender, no organomegaly, BS present  Extremities: trace edema to all ext  Diffuse raised petechiae on elbows, palms and feet noted   Distal pulses well 
 Progress Note    Admit Date:  12/10/2024  13    Interval history:  69 y.o. female with a history of laryngeal cancer status post tracheostomy who presented to ED with complaint of dehydration.     reports hx of laryngeal cancer needing surgery with tracheostomy , PEG placement   Completed chemo radiation in 8/24, had trach decannulated and PEG removed last month  F/w [facility] oncology . Went to rehab last month and was dcd to home few weeks ago with progressive weakness and poor appetite    Steroids started for rash  Rash is starting to look better    Subjective:  Ms. Alegre seen up in bed.  Feels about the same today.   Cough is the same today.   Drank ensure this morning.   Rash looks better just since yesterday      12/16- complains of pain all over and asking for Dilaudid. ECHO is being done this am. Will likely need bronchoscopy and kidney biopsy.    12/17- Serine protease 3 antibody is positive.     12/18- seen at HD. Had bronchoscopy on 12/17.  Plans for renal biopsy.      12/19- scheduled for kidney biopsy in am.     12/20- plans for kidney biopsy today.    12/21- had a small hematoma after kidney biopsy per IR. Her H and H has dropped. She got another unit this am. BP stable.     12/22-CTA of the abdomen did not show any acute bleeding.  Received a total of 4 units of blood.    12/23- needed another unit last night. NPO for TDC today.    Objective:   /75   Pulse 70   Temp 98.6 °F (37 °C) (Axillary)   Resp 16   Ht 1.651 m (5' 5\")   Wt 72.4 kg (159 lb 9.8 oz)   SpO2 96%   BMI 26.56 kg/m²     Intake/Output Summary (Last 24 hours) at 12/23/2024 1200  Last data filed at 12/23/2024 0745  Gross per 24 hour   Intake 584 ml   Output 0 ml   Net 584 ml         Physical Exam:    General:  elderly female, pale, fatigued and ill appearing  Awake, alert and oriented. Appears to be not in any distress  Mucous Membranes:  Pale , anicteric  Healing small trach opening with no drainage  Neck: No JVD, no carotid 
 Progress Note    Admit Date:  12/10/2024  14    Interval history:  69 y.o. female with a history of laryngeal cancer status post tracheostomy who presented to ED with complaint of dehydration.     reports hx of laryngeal cancer needing surgery with tracheostomy , PEG placement   Completed chemo radiation in 8/24, had trach decannulated and PEG removed last month  F/w [facility] oncology . Went to rehab last month and was dcd to home few weeks ago with progressive weakness and poor appetite    Steroids started for rash  Rash is starting to look better    Subjective:  Ms. Alegre seen up in bed.  Feels about the same today.   Cough is the same today.   Drank ensure this morning.   Rash looks better just since yesterday      12/16- complains of pain all over and asking for Dilaudid. ECHO is being done this am. Will likely need bronchoscopy and kidney biopsy.    12/17- Serine protease 3 antibody is positive.     12/18- seen at HD. Had bronchoscopy on 12/17.  Plans for renal biopsy.      12/19- scheduled for kidney biopsy in am.     12/20- plans for kidney biopsy today.    12/21- had a small hematoma after kidney biopsy per IR. Her H and H has dropped. She got another unit this am. BP stable.     12/22-CTA of the abdomen did not show any acute bleeding.  Received a total of 4 units of blood.    12/23- needed another unit last night. NPO for TDC today.    12/24- TDC done. HD today    Objective:   BP (!) 143/73   Pulse 69   Temp 97.7 °F (36.5 °C) (Oral)   Resp 16   Ht 1.651 m (5' 5\")   Wt 72.4 kg (159 lb 9.8 oz)   SpO2 97%   BMI 26.56 kg/m²     Intake/Output Summary (Last 24 hours) at 12/24/2024 1153  Last data filed at 12/24/2024 0928  Gross per 24 hour   Intake 2188.62 ml   Output 0 ml   Net 2188.62 ml         Physical Exam:    General:  elderly female, pale, fatigued and ill appearing  Awake, alert and oriented. Appears to be not in any distress  Mucous Membranes:  Pale , anicteric  Healing small trach opening with no 
 Progress Note    Admit Date:  12/10/2024  8    Interval history:  69 y.o. female with a history of laryngeal cancer status post tracheostomy who presented to ED with complaint of dehydration.     reports hx of laryngeal cancer needing surgery with tracheostomy , PEG placement   Completed chemo radiation in 8/24, had trach decannulated and PEG removed last month  [facility] . Went to rehab last month and was dcd to home few weeks ago with progressive weakness and poor appetite    Steroids started for rash  Rash is starting to look better    Subjective:  Ms. Alegre seen up in bed.  Feels about the same today.   Cough is the same today.   Drank ensure this morning.   Rash looks better just since yesterday      12/16- complains of pain all over and asking for Dilaudid. ECHO is being done this am. Will likely need bronchoscopy and kidney biopsy.    12/17- Serine protease 3 antibody is positive.     12/18- seen at . Had bronchoscopy on 12/17.  Plans for renal biopsy.    Objective:   BP (!) 157/87   Pulse 73   Temp 97.8 °F (36.6 °C)   Resp 18   Ht 1.651 m (5' 5\")   Wt 71.3 kg (157 lb 3 oz)   SpO2 96%   BMI 26.16 kg/m²     Intake/Output Summary (Last 24 hours) at 12/18/2024 1025  Last data filed at 12/17/2024 2328  Gross per 24 hour   Intake 744.51 ml   Output 0 ml   Net 744.51 ml       Physical Exam:    General:  elderly female, pale, fatigued and ill appearing  Awake, alert and oriented. Appears to be not in any distress  Mucous Membranes:  Pale , anicteric  Healing small trach opening with no drainage  Neck: No JVD, no carotid bruit, no thyromegaly  Chest:  Clear to auscultation bilaterally,  diminished In bases  Right sided chest port  Cardiovascular:  RRR S1S2 heard, no murmurs or gallops  Abdomen:  Soft, undistended, large ventral hernia reducible  non tender, no organomegaly, BS present  Extremities: trace edema to all ext  Diffuse raised petechiae on elbows, palms and feet noted   Distal pulses 
 Progress Note    Admit Date:  12/10/2024  9    Interval history:  69 y.o. female with a history of laryngeal cancer status post tracheostomy who presented to ED with complaint of dehydration.     reports hx of laryngeal cancer needing surgery with tracheostomy , PEG placement   Completed chemo radiation in 8/24, had trach decannulated and PEG removed last month  F/w [facility] oncology . Went to rehab last month and was dcd to home few weeks ago with progressive weakness and poor appetite    Steroids started for rash  Rash is starting to look better    Subjective:  Ms. Alegre seen up in bed.  Feels about the same today.   Cough is the same today.   Drank ensure this morning.   Rash looks better just since yesterday      12/16- complains of pain all over and asking for Dilaudid. ECHO is being done this am. Will likely need bronchoscopy and kidney biopsy.    12/17- Serine protease 3 antibody is positive.     12/18- seen at . Had bronchoscopy on 12/17.  Plans for renal biopsy.      12/19- scheduled for kidney biopsy in am.     Objective:   BP (!) 154/81   Pulse 90   Temp 97.8 °F (36.6 °C) (Oral)   Resp 18   Ht 1.651 m (5' 5\")   Wt 69.8 kg (153 lb 14.1 oz)   SpO2 96%   BMI 25.61 kg/m²     Intake/Output Summary (Last 24 hours) at 12/19/2024 1052  Last data filed at 12/19/2024 0827  Gross per 24 hour   Intake 240 ml   Output --   Net 240 ml       Physical Exam:    General:  elderly female, pale, fatigued and ill appearing  Awake, alert and oriented. Appears to be not in any distress  Mucous Membranes:  Pale , anicteric  Healing small trach opening with no drainage  Neck: No JVD, no carotid bruit, no thyromegaly  Chest:  Clear to auscultation bilaterally,  diminished In bases  Right sided chest port  Cardiovascular:  RRR S1S2 heard, no murmurs or gallops  Abdomen:  Soft, undistended, large ventral hernia reducible  non tender, no organomegaly, BS present  Extremities: trace edema to all ext  Diffuse raised petechiae 
1338: Blood was paused for CTA with contrast.     1358: Blood was resumed. No transfusion reactions noted.     RN remained with patient throughout exam.     MD made aware of right flank ecchymosis development.    Tana Lopes RN    
4 Eyes Skin Assessment     NAME:  Emily Alegre  YOB: 1955  MEDICAL RECORD NUMBER:  [id number]    The patient is being assessed for  Admission    I agree that at least one RN has performed a thorough Head to Toe Skin Assessment on the patient. ALL assessment sites listed below have been assessed.      Areas assessed by both nurses:    Head, Face, Ears, Shoulders, Back, Chest, Arms, Elbows, Hands, Sacrum. Buttock, Coccyx, Ischium, Legs. Feet and Heels, and Under Medical Devices         Does the Patient have a Wound? Yes wound(s) were present on assessment. LDA wound assessment was Initiated and completed by RN       Romario Prevention initiated by RN: Yes  Wound Care Orders initiated by RN: No    Pressure Injury (Stage 3,4, Unstageable, DTI, NWPT, and Complex wounds) if present, place Wound referral order by RN under : No    New Ostomies, if present place, Ostomy referral order under : No     Nurse 1 eSignature:     **SHARE this note so that the co-signing nurse can place an eSignature**    Nurse 2 eSignature: {Esignature:[id number]}   
4 Eyes Skin Assessment     NAME:  Emily Alegre  YOB: 1955  MEDICAL RECORD NUMBER:  [id number]    The patient is being assessed for  Admission    I agree that at least one RN has performed a thorough Head to Toe Skin Assessment on the patient. ALL assessment sites listed below have been assessed.      Areas assessed by both nurses:    Head, Face, Ears, Shoulders, Back, Chest, Arms, Elbows, Hands, Sacrum. Buttock, Coccyx, Ischium, Legs. Feet and Heels, and Under Medical Devices         Does the Patient have a Wound? Yes wound(s) were present on assessment. LDA wound assessment was Initiated and completed by RN       Pt has petechial rash to arms, hands, legs see pictures.  Pt has pressure injury to coccyx, see picture.  Pt has reddened area, scabbed to R posterior heel, see picture.  Pt has redness to L posterior heel, see picture.     Romario Prevention initiated by RN: Yes  Wound Care Orders initiated by RN: No    Pressure Injury (Stage 3,4, Unstageable, DTI, NWPT, and Complex wounds) if present, place Wound referral order by RN under : No    New Ostomies, if present place, Ostomy referral order under : No     Nurse 1 eSignature: Electronically signed by Steffanie Miramontes RN on 12/11/24 at 2:24 AM EST    **SHARE this note so that the co-signing nurse can place an eSignature**    Nurse 2 eSignature:   
4 Eyes Skin Assessment     NAME:  Emily Alegre  YOB: 1955  MEDICAL RECORD NUMBER:  [id number]    The patient is being assessed for  Admission    I agree that at least one RN has performed a thorough Head to Toe Skin Assessment on the patient. ALL assessment sites listed below have been assessed.      Areas assessed by both nurses:    Head, Face, Ears, Shoulders, Back, Chest, Arms, Elbows, Hands, Sacrum. Buttock, Coccyx, Ischium, Legs. Feet and Heels, and Under Medical Devices         Does the Patient have a Wound? Yes wound(s) were present on assessment. LDA wound assessment was Initiated and completed by RN       Pt has petechial rash to arms, hands, legs see pictures.  Pt has pressure injury to coccyx, see picture.  Pt has reddened area, scabbed to R posterior heel, see picture.  Pt has redness to L posterior heel, see picture.     Romario Prevention initiated by RN: Yes  Wound Care Orders initiated by RN: No    Pressure Injury (Stage 3,4, Unstageable, DTI, NWPT, and Complex wounds) if present, place Wound referral order by RN under : No    New Ostomies, if present place, Ostomy referral order under : No     Nurse 1 eSignature: Electronically signed by Steffanie Miramontes RN on 12/11/24 at 2:24 AM EST    **SHARE this note so that the co-signing nurse can place an eSignature**    Nurse 2 eSignature:   
4 Eyes Skin Assessment     NAME:  Emily Alegre  YOB: 1955  MEDICAL RECORD NUMBER:  [id number]    The patient is being assessed for  Transfer to New Unit    I agree that at least one RN has performed a thorough Head to Toe Skin Assessment on the patient. ALL assessment sites listed below have been assessed.      Areas assessed by both nurses:    Head, Face, Ears, Shoulders, Back, Chest, Arms, Elbows, Hands, Sacrum. Buttock, Coccyx, Ischium, Legs. Feet and Heels, and Under Medical Devices     Petechia to legs, feet and hands, old trach site noted, wound noted to sacrum, mepilex applied, new vas cath to right chest     Does the Patient have a Wound? Yes wound(s) were present on assessment. LDA wound assessment was Initiated and completed by RN       Romario Prevention initiated by RN: Yes  Wound Care Orders initiated by RN: Yes    Pressure Injury (Stage 3,4, Unstageable, DTI, NWPT, and Complex wounds) if present, place Wound referral order by RN under : Yes    New Ostomies, if present place, Ostomy referral order under : No     Nurse 1 eSignature: Electronically signed by Francia Grimaldo RN on 12/13/24 at 6:17 PM EST    **SHARE this note so that the co-signing nurse can place an eSignature**    Nurse 2 eSignature: Electronically signed by Lopez Serrano RN on 12/13/24 at 6:23 PM EST   
AM assessment done. Pt is alert & oriented.   O2 weaned to 3 liters per NC and pt is tolerating well. Will continue to wean as tolerated.    
AM assessment done. Pt is alert & oriented. Pt is on 3 liters NC. Pt c/o generalized discomfort. Pain meds given.  Pt is stable & w/out evidence of distress @ this time.  
Admitted pt to ICU 12 from ED. Pt alert and oriented.  IV NS @ 75 ml/hr infusing R arm.  Pt sinus rhythm on monitor 70's.    
BP (!) 147/79   Pulse 98   Temp 97.8 °F (36.6 °C) (Axillary)   Resp 16   Ht 1.651 m (5' 5\")   Wt 69.8 kg (153 lb 14.1 oz)   SpO2 90%   BMI 25.61 kg/m²       Last vitals shown. Rate increased from 50ml/hr to 100ml/hr per order. Per protocol. VS now [id number] x1 hour and then with Medication increase/rate dose change  
Back from dialysis at this time.  VSS.  Call in easy reach.  Bed alarm on for safety.  Continue to monitor closely.  Eulalia James RN  
Back on the unit from CT.  Patient repositioned for comfort.  Zapien intact; very little output. Tea  Encouraged cough/deep breathing, ankle pumps  SCD's knee high bilaterally, on.   Personal effects in easy reach.  
Bedside report and transfer of care given to CHUCK Palacios. Pt currently resting in bed with the call light within reach. Pt denies any other care needs at this time. Pt stable at this time.    
Bedside report and transfer of care given to CHUCK Rodriguez. Pt currently resting in bed with the call light within reach. Pt denies any other care needs at this time. Pt stable at this time.    Tana Lopes RN      
Bedside report and transfer of care given to CHUCK Rodriguez. Pt currently resting in bed with the call light within reach. Pt denies any other care needs at this time. Pt stable at this time.    Tana Lopes RN      
Bedside report and transfer of care given to CHUCK Simpson. Pt currently resting in bed with the call light within reach. Pt denies any other care needs at this time. Pt stable at this time.   
Bedside report and transfer of care given to CHUCK Will. Pt currently resting in bed with the call light within reach.     
Bedside report and transfer of care given to Cassandra Esteban. Pt currently resting in bed with the call light within reach.     
Bedside report and transfer of care given to Tana Trujillo. Pt currently resting in bed with the call light within reach. Pt denies any other care needs at this time. Pt stable at this time.   
Bedside report and transfer of care given to Viviana Trujillo. Pt currently resting in bed with the call light within reach. Pt denies any other care needs at this time. Pt stable at this time.   
Blood reach vein at 10:42. Vitals are stable. See flow Shett.    15 minutes Vitals at 10:57    98.2 - Temp  82 - HR  91 % O2  BP  140/72    No reported issues at this time. Patient being transferred to [facility] for Bronch.    
Blood started at this time  
Blood transfusion completed during CT procedure. Line flushed with NS 0.9%  
Blood transfusion sopped at this time. No S/S of acute distress noted.   
Blood transfusion started at 0120am at 60 ml/h.  
CBC results noted. Dr. Kelsey updated order received to infuse 1 unit PRBC's     Latest Reference Range & Units 12/12/24 17:44   WBC 4.0 - 11.0 K/uL 8.3   RBC 4.00 - 5.20 M/uL 2.37 (L)   Hemoglobin Quant 12.0 - 16.0 g/dL 6.9 (LL)   Hematocrit 36.0 - 48.0 % 20.7 (LL)   MCV 80.0 - 100.0 fL 87.3   MCH 26.0 - 34.0 pg 29.0   (L  
CM-SR, VSS, pt remains afebrile, No UO noted this shift. Pt continues to c/o generalized discomfort and anxiety. Hydrocodone and Buspar given.  Pt taken to IR for dialysis line placement and is now in dialysis for a treatment.  Pt is on 3 liters NC and tolerating well.    
CM-SR, VSS, pt remains afebrile, UO min. Urine color is tea with noted sediment.  Pt is taking PO w/out difficulty, but pt states that she is just not hungry.  O2 weaned to 3 liter per NC and pt is tolerating well @ this time.    
Call from lab with critical result.    Creatinine 5.2    Other labs:   H/H  7.7/23  K.5.3    No urine output this shift. Small amount of drainage noted in the urine catheter tubing to appear bloody.    MATILDE Kilgore was updated per [employer]. Note read and deferring to day shift team for follow up.    Electronically signed by Daniella Humphries RN on 12/13/2024 at 4:58 AM    
Called Jocelyn RN to let her know that the pt needs to stay on her back with the rolled towel until 3:52.   
Care rounds completed with Dr. Stern and multidisciplinary team. Reviewed labs, meds, VS, assessment, & plan of care for today.  -informed of Hospitalist concern for Vasculitis .   -informed of 1 unit PRBC, and 1 unit prior to ICU admit.   -Will contact Hematology/Onco. In regards for potential need of biopsy.  -D/C IV pain medication. Order for PO.     See dictated note and new orders for details.        
Ciprofloxacin    Recent Labs     24  0428 24  0600   CREATININE 2.7* 3.3*     Estimated Creatinine Clearance: 16 mL/min (A) (based on SCr of 3.3 mg/dL (H)).  Recent Labs     24  1024 24  1750 24  0235   WBC 9.4 6.6 6.8       Drug Usual Dose CrCl ml/min Renal Adjustment Dialysis (IHD/PD)  IHD assumes 3x weekly   Ciprofloxacin (PO) 250-500 mg q12h  (intra-abdominal/UTI)     >= 30  <30 250 - 500mg q12h  250 - 500mg q24h HD/PD:  250 - 500 mg q24h post HD          750mg q12h  (PSA / severe infections)   >= 30  <30 750mg q12h  500 - 750mg q24h HD/PD: 500 - 750 mg q24h post HD     Ciprofloxacin (IV) 400 mg q12h >= 30  <30 400mg q12h  400mg q24h HD/PD: 200 - 400 mg q24h  On HD days, schedule dose  post-HD    400mg q8h   (PNA, neutropenia, PSA, osteomyelitis, meningitis) >= 30  <30 400mg q8h  400mg q12h HD/PD: 400 mg q24h  On HD days, schedule dose   post-HD     CRRT: Refer to Table 2 [facility] CRRT/SLEDD Automatic Dosing Document  Usual dosin mg q12h (q8h if pseudomonas aeruginosa highly suspected or isolated)    Alyssia Simpson [facility] 2024 8:12 AM    
Cleaned up stoma and replaced gauze and tape.   
Comprehensive Nutrition Assessment    Type and Reason for Visit:  Initial, Positive nutrition screen (+ screen for stage 2 pressure wound and MST = 5)    Nutrition Recommendations/Plan:   Continue NPO status until patient is medically cleared to receive nutrition therapy.   Monitor for diet advancement + appetite and po intake when diet is advanced.   Will consider ordering ONS once diet is advanced.   Monitor nutrition-related labs, bowel function, and weight trends.      Malnutrition Assessment:  Malnutrition Status:  Severe malnutrition (12/11/24 1104)    Context:  Acute Illness     Findings of the 6 clinical characteristics of malnutrition:  Energy Intake:  50% or less of estimated energy requirements for 5 or more days  Weight Loss:  Greater than 7.5% over 3 months (-18# or 10.8% weight loss since 9/8/24)     Body Fat Loss:  Unable to assess     Muscle Mass Loss:  Unable to assess    Fluid Accumulation:  Mild Extremities (BLE + 2 edema)   Strength:  Not Performed    Nutrition Assessment:    patient is severely malnourished r/t inadequate protein-energy intake, inability to care for herself, and poor appetite AEB poor po intake PTA (< 50% x 5+ days), lab values, and patient reported barriers; she is at risk for further compromise d/t NPO status, weight loss PTA, and inability to care for herself/failure to thrive; will continue NPO status and monitor nutrition plan of care    Nutrition Related Findings:    patient is A & O; she presented with dehydration; patient was d/c'd from [facility] x 1 week PTA and she is currently living with her niece; patient has had a poor appetite and po intake PTA; NS at 75 ml/hr; hx of larynx cancer with chemo completed in Aug 2024; patient had trach decannulated and PEG removed in Nov 2024; no documented BM for this admission; SLP consulted for SLP eval; + petechiae rash on palms and other areas of her body; nephro is following; hgb was 6.8 this am Wound Type: Multiple, 
Comprehensive Nutrition Assessment    Type and Reason for Visit:  Reassess    Nutrition Recommendations/Plan:   Continue ADULT DIET; Regular diet order.   Started Ensure Plus with meals.   Monitor appetite, meal intake, and acceptance/intake of ONS.   Please obtain an updated weight for this patient - last weight was obtained on 12/11/24.   Monitor nutrition-related labs, bowel function, and weight trends.      Malnutrition Assessment:  Malnutrition Status:  Severe malnutrition (12/13/24 1221)    Context:  Acute Illness     Findings of the 6 clinical characteristics of malnutrition:  Energy Intake:  50% or less of estimated energy requirements for 5 or more days  Weight Loss:  Greater than 7.5% over 3 months (-18# or 10.8% weight loss since 9/8/24)     Body Fat Loss:  Moderate body fat loss Triceps   Muscle Mass Loss:  Mild muscle mass loss Temples (temporalis), Clavicles (pectoralis & deltoids), Thigh (quadriceps)  Fluid Accumulation:  Moderate to Severe (BLE + 2 pitting edema) Extremities   Strength:  Not Performed    Nutrition Assessment:    patient remains unchanged from a nutritional standpoint since last RD assessment; patient remains at risk for further compromise d/t poor po intake, severe malnutrition, and altered nutrition-related labs; will continue ADULT DIET; Regular diet order and will add Ensure Plus with meals    Nutrition Related Findings:    patient is A & O x 4; she received 1 unit PRBC last night; patient may need to start dialysis; NaBicarb 100 mEq in NS at 60 ml/hr infusing at this time; no documented BM for this admission; patient consumed 1-25% x 1 meal and 26-50% x 1 meal on 12/12/24; on miralax; petechiae remains on patient's bilateral hands and bilateral lower extremities Wound Type: Multiple, Pressure Injury, Stage I, Stage II (stage 1 pressure wound on R ankle; stage 2 pressure wound on sacrum and L ankle)       Current Nutrition Intake & Therapies:    Average Meal Intake: 1-25%, 
Comprehensive Nutrition Assessment    Type and Reason for Visit:  Reassess    Nutrition Recommendations/Plan:   Continue NPO      Malnutrition Assessment:  Malnutrition Status:  Severe malnutrition (12/17/24 1415)    Context:  Acute Illness     Findings of the 6 clinical characteristics of malnutrition:  Energy Intake:  50% or less of estimated energy requirements for 5 or more days  Weight Loss:  Greater than 7.5% over 3 months (-18# or 10.8% weight loss since 9/8/24)     Body Fat Loss:  Moderate body fat loss Triceps   Muscle Mass Loss:  Mild muscle mass loss Temples (temporalis), Clavicles (pectoralis & deltoids), Thigh (quadriceps)  Fluid Accumulation:  Mild Extremities (+ 1 pitting edema)   Strength:  Not Performed    Nutrition Assessment:    patient remains unchanged from a nutritional standpoint since last RD assessment; patient remains at risk for further compromise d/t most meals are < 50% consumed during this admission, renal dysfunction, altered nutrition-related labs, and impaired respiratory function; will continue NPO and continue to monitor   Nutrition Related Findings:    Pt was off of unit for kidney biopsy and trnsfuion; NPO ; intkes prior have been < 50%; low Na; Low h/h; +BM 12/19; Wound Type: Multiple, Pressure Injury, Stage I, Stage II (stage 1 pressure wound on R ankle; stage 2 pressure wound on sacrum and L ankle)       Current Nutrition Intake & Therapies:    Average Meal Intake: 26-50%, 1-25%, 0%  Average Supplements Intake: 26-50%, %  Diet NPO    Anthropometric Measures:  Height: 165.1 cm (5' 5\")  Ideal Body Weight (IBW): 125 lbs (57 kg)    Admission Body Weight: 67.1 kg (148 lb) (obtained on 12/10/24; actual weight)  Current Body Weight: 69.8 kg (153 lb 14.1 oz), 114.3 % IBW. Weight Source: Bed scale  Current BMI (kg/m2): 25.6  Usual Body Weight: 76.6 kg (168 lb 14 oz) (obtained on 9/8/24; actual weight)     % Weight Change (Calculated): -15.4  Weight Adjustment For: No 
Consent obtained for renal biopsy and placed sift chart.   
Consult has been added to Eli Lea RN case manager  on 12/16/24. 1:37 PM    Jenanette Sexton  12/16/2024  
Consulted with IR Dr. Bailey regarding possible renal Bx. Dr. Bailey states pt is not at optimum health for a renal biopsy. HgB and platelet count low, pt is on O2 and is trached, pt has multiple effusions, PNA and lung fibrosis and would have to lay prone for procedure. Lisa WOODS made aware. States she will inform ordering Dr. Deutsch. Informed Lisa to have Dr. Deutsch reach out to Dr. Bailey for any questions. Cancelling biopsy order until further notice. No further actions taken from IR team at this time.  
Department of Internal Medicine  Nephrology Progress Note        SUBJECTIVE:    We are following this patient for A/CKD.  The patient was seen and examined; she feels well today with no CP, SOB, nausea or vomiting.    ROS: No fever or chills.  Social: Family at bedside.    Physical Exam:    VITALS:  BP (!) 98/54   Pulse 72   Temp 98.7 °F (37.1 °C) (Oral)   Resp 21   Ht 1.651 m (5' 5\")   Wt 68.4 kg (150 lb 11.2 oz)   SpO2 100%   BMI 25.08 kg/m²     General appearance: Seems comfortable, no acute distress.  Neck: Trachea midline, thyroid normal.   Lungs:  Non labored breathing, CTA to anterior auscultation.  Heart:  S1S2 normal, rub or gallop. No peripheral edema.  Abdomen: Soft, non-tender, no organomegaly.   Skin: No lesions or rashes, warm to touch.     DATA:    CBC with Differential:    Lab Results   Component Value Date/Time    WBC 6.2 12/11/2024 05:00 AM    RBC 2.28 12/11/2024 05:00 AM    HGB 7.6 12/11/2024 12:24 PM    HCT 22.6 12/11/2024 12:24 PM     12/11/2024 05:00 AM    MCV 86.3 12/11/2024 05:00 AM    MCH 29.7 12/11/2024 05:00 AM    MCHC 34.4 12/11/2024 05:00 AM    RDW 16.4 12/11/2024 05:00 AM    LYMPHOPCT 5.7 12/11/2024 05:00 AM    MONOPCT 3.9 12/11/2024 05:00 AM    EOSPCT 0.3 12/11/2024 05:00 AM    BASOPCT 0.3 12/11/2024 05:00 AM    MONOSABS 0.2 12/11/2024 05:00 AM    LYMPHSABS 0.4 12/11/2024 05:00 AM    EOSABS 0.0 12/11/2024 05:00 AM    BASOSABS 0.0 12/11/2024 05:00 AM     BMP:    Lab Results   Component Value Date/Time     12/12/2024 05:01 AM    K 4.6 12/12/2024 05:01 AM    K 4.6 12/11/2024 05:01 AM    CL 95 12/12/2024 05:01 AM    CO2 19 12/12/2024 05:01 AM    BUN 73 12/12/2024 05:01 AM    CREATININE 4.8 12/12/2024 05:01 AM    CALCIUM 7.0 12/12/2024 05:01 AM    LABGLOM 9 12/12/2024 05:01 AM    GLUCOSE 79 12/12/2024 05:01 AM       IMPRESSION/RECOMMENDATIONS:      1- A/CKD: The patient has chronic kidney disease with a baseline creatinine of 1.4 to 1.6 mg/dl. Her FELISA is likely secondary 
Department of Internal Medicine  Nephrology Progress Note        SUBJECTIVE:    We are following this patient for A/CKD.  The patient was seen and examined; she feels well today with no CP, SOB, nausea or vomiting.    ROS: No fever or chills.  Social: No family at bedside.    Physical Exam:    VITALS:  /64   Pulse 64   Temp 97.5 °F (36.4 °C) (Oral)   Resp 13   Ht 1.651 m (5' 5\")   Wt 68.4 kg (150 lb 11.2 oz)   SpO2 97%   BMI 25.08 kg/m²     General appearance: Seems comfortable, no acute distress.  Neck: Trachea midline, thyroid normal.   Lungs:  Non labored breathing, CTA to anterior auscultation.  Heart:  S1S2 normal, rub or gallop. No peripheral edema.  Abdomen: Soft, non-tender, no organomegaly.   Skin: No lesions or rashes, warm to touch.     DATA:    CBC with Differential:    Lab Results   Component Value Date/Time    WBC 7.1 12/13/2024 04:10 AM    RBC 2.66 12/13/2024 04:10 AM    HGB 7.7 12/13/2024 04:10 AM    HCT 23.0 12/13/2024 04:10 AM     12/13/2024 04:10 AM    MCV 86.5 12/13/2024 04:10 AM    MCH 29.1 12/13/2024 04:10 AM    MCHC 33.6 12/13/2024 04:10 AM    RDW 16.7 12/13/2024 04:10 AM    LYMPHOPCT 5.7 12/11/2024 05:00 AM    MONOPCT 3.9 12/11/2024 05:00 AM    EOSPCT 0.3 12/11/2024 05:00 AM    BASOPCT 0.3 12/11/2024 05:00 AM    MONOSABS 0.2 12/11/2024 05:00 AM    LYMPHSABS 0.4 12/11/2024 05:00 AM    EOSABS 0.0 12/11/2024 05:00 AM    BASOSABS 0.0 12/11/2024 05:00 AM     BMP:    Lab Results   Component Value Date/Time     12/13/2024 11:50 AM    K 4.9 12/13/2024 11:50 AM    K 4.6 12/11/2024 05:01 AM    CL 95 12/13/2024 11:50 AM    CO2 20 12/13/2024 11:50 AM    BUN 75 12/13/2024 11:50 AM    CREATININE 5.2 12/13/2024 11:50 AM    CALCIUM 7.1 12/13/2024 11:50 AM    LABGLOM 8 12/13/2024 11:50 AM    GLUCOSE 119 12/13/2024 11:50 AM       IMPRESSION/RECOMMENDATIONS:      1- A/CKD: The patient has chronic kidney disease with a baseline creatinine of 1.4 to 1.6 mg/dl. Her FELISA is likely secondary 
Department of Internal Medicine  Nephrology Progress Note        SUBJECTIVE:    We are following this patient for A/CKD.  The patient was seen and examined; she was resting comfortably with no acute events noted overnight.    ROS: No fever or chills.  Social: No family at bedside.    Physical Exam:    VITALS:  /70   Pulse 83   Temp 97.5 °F (36.4 °C) (Oral)   Resp 20   Ht 1.651 m (5' 5\")   Wt 68.4 kg (150 lb 12.7 oz)   SpO2 97%   BMI 25.09 kg/m²     General appearance: Seems comfortable, no acute distress.  Neck: Trachea midline, thyroid normal.   Lungs:  Non labored breathing, CTA to anterior auscultation.  Heart:  S1S2 normal, rub or gallop. No peripheral edema.  Abdomen: Soft, non-tender, no organomegaly.   Skin: No lesions or rashes, warm to touch.     DATA:    CBC with Differential:    Lab Results   Component Value Date/Time    WBC 8.3 12/14/2024 04:58 AM    RBC 2.53 12/14/2024 04:58 AM    HGB 7.4 12/14/2024 04:58 AM    HCT 21.7 12/14/2024 04:58 AM     12/14/2024 04:58 AM    MCV 86.0 12/14/2024 04:58 AM    MCH 29.2 12/14/2024 04:58 AM    MCHC 34.0 12/14/2024 04:58 AM    RDW 16.7 12/14/2024 04:58 AM    LYMPHOPCT 5.7 12/11/2024 05:00 AM    MONOPCT 3.9 12/11/2024 05:00 AM    EOSPCT 0.3 12/11/2024 05:00 AM    BASOPCT 0.3 12/11/2024 05:00 AM    MONOSABS 0.2 12/11/2024 05:00 AM    LYMPHSABS 0.4 12/11/2024 05:00 AM    EOSABS 0.0 12/11/2024 05:00 AM    BASOSABS 0.0 12/11/2024 05:00 AM     BMP:    Lab Results   Component Value Date/Time     12/14/2024 04:58 AM    K 4.8 12/14/2024 04:58 AM    K 4.6 12/11/2024 05:01 AM    CL 96 12/14/2024 04:58 AM    CO2 23 12/14/2024 04:58 AM    BUN 53 12/14/2024 04:58 AM    CREATININE 4.0 12/14/2024 04:58 AM    CALCIUM 7.2 12/14/2024 04:58 AM    LABGLOM 12 12/14/2024 04:58 AM    GLUCOSE 106 12/14/2024 04:58 AM       IMPRESSION/RECOMMENDATIONS:      1- A/CKD: The patient has chronic kidney disease with a baseline creatinine of 1.4 to 1.6 mg/dl. Her FELISA is likely 
Department of Internal Medicine  Nephrology Progress Note        SUBJECTIVE:    We are following this patient for A/CKD.  The patient was seen and examined; she was resting comfortably with no acute events noted overnight.    ROS: No fever or chills.  Social: No family at bedside.    Physical Exam:    VITALS:  BP (!) 141/74   Pulse 95   Temp 97.3 °F (36.3 °C) (Axillary)   Resp 20   Ht 1.651 m (5' 5\")   Wt 66.7 kg (147 lb 0.8 oz)   SpO2 90%   BMI 24.47 kg/m²     General appearance: Seems comfortable, no acute distress.  Neck: Trachea midline, thyroid normal.   Lungs:  Non labored breathing, CTA to anterior auscultation.  Heart:  S1S2 normal, rub or gallop. No peripheral edema.  Abdomen: Soft, non-tender, no organomegaly.   Skin: No lesions or rashes, warm to touch.     DATA:    CBC with Differential:    Lab Results   Component Value Date/Time    WBC 4.5 12/15/2024 04:37 AM    RBC 2.45 12/15/2024 04:37 AM    HGB 7.0 12/15/2024 04:37 AM    HCT 21.1 12/15/2024 04:37 AM     12/15/2024 04:37 AM    MCV 86.1 12/15/2024 04:37 AM    MCH 28.7 12/15/2024 04:37 AM    MCHC 33.4 12/15/2024 04:37 AM    RDW 16.7 12/15/2024 04:37 AM    LYMPHOPCT 5.7 12/11/2024 05:00 AM    MONOPCT 3.9 12/11/2024 05:00 AM    EOSPCT 0.3 12/11/2024 05:00 AM    BASOPCT 0.3 12/11/2024 05:00 AM    MONOSABS 0.2 12/11/2024 05:00 AM    LYMPHSABS 0.4 12/11/2024 05:00 AM    EOSABS 0.0 12/11/2024 05:00 AM    BASOSABS 0.0 12/11/2024 05:00 AM     BMP:    Lab Results   Component Value Date/Time     12/15/2024 04:37 AM    K 4.0 12/15/2024 04:37 AM    K 4.6 12/11/2024 05:01 AM    CL 98 12/15/2024 04:37 AM    CO2 24 12/15/2024 04:37 AM    BUN 38 12/15/2024 04:37 AM    CREATININE 3.2 12/15/2024 04:37 AM    CALCIUM 7.3 12/15/2024 04:37 AM    LABGLOM 15 12/15/2024 04:37 AM    GLUCOSE 105 12/15/2024 04:37 AM       IMPRESSION/RECOMMENDATIONS:      1- A/CKD: The patient has chronic kidney disease with a baseline creatinine of 1.4 to 1.6 mg/dl. Her FELISA is 
Dr Deutsch called plan on kidney biopsy today-orders changed for patient to NPO.Patient in dialysis since 0700  
Dr. Kelsey at the bedside to examine patient.   -SLP consult- ok to eat given \"ileus\" on CT scan.   -1 Unit PRBC.  -Concern for Vasculitis- consult Oncology/Hematology.    See progress note for details.    
Hem/Onc consult sent via perfect serve, Meghan Cohen 12/11/24 @ 8661  
IM Progress Note    Admit Date:  12/10/2024  2    Interval history:  69 y.o. female with a history of laryngeal cancer status post tracheostomy who presented to ED with complaint of dehydration.     reports hx of laryngeal cancer needing surgery with tracheostomy , PEG placement   Completed chemo radiation in 8/24, had trach decannulated and PEG removed last month  F/w [facility] oncology . Went to rehab last month and was dcd to home few weeks ago with progressive weakness and poor appetite      Subjective:  Ms. Alegre seen up in bed, no acute issues overnight  Remains on 2 L , received 2 units PRBC since admission   No significant UOP with ivf and albumin    Feels about the same, fatigued  No fevers  Rash on ext did not change much         Objective:   BP (!) 105/52   Pulse 69   Temp 98.4 °F (36.9 °C) (Oral)   Resp 20   Ht 1.651 m (5' 5\")   Wt 68.4 kg (150 lb 11.2 oz)   SpO2 100%   BMI 25.08 kg/m²     Intake/Output Summary (Last 24 hours) at 12/12/2024 0733  Last data filed at 12/12/2024 0445  Gross per 24 hour   Intake 1538.89 ml   Output 250 ml   Net 1288.89 ml       Physical Exam:        General:  elderly female, pale, fatigued and ill appearing  Awake, alert and oriented. Appears to be not in any distress  Mucous Membranes:  Pale , anicteric  Healing small trach opening with no drainage  Neck: No JVD, no carotid bruit, no thyromegaly  Chest:  Clear to auscultation bilaterally,  diminished In bases  Right sided chest port  Cardiovascular:  RRR S1S2 heard, no murmurs or gallops  Abdomen:  Soft, undistended, large ventral hernia reducible  non tender, no organomegaly, BS present  Extremities: trace edema to all ext  Diffuse raised petechiae on elbows, palms and feet noted   Distal pulses well felt  Neurological : grossly normal with gen weakness       Medications:   Scheduled Medications:    sodium chloride flush  5-40 mL IntraVENous 2 times per day    heparin (porcine)  5,000 Units SubCUTAneous 3 times per day 
IM Progress Note    Admit Date:  12/10/2024  3    Interval history:  69 y.o. female with a history of laryngeal cancer status post tracheostomy who presented to ED with complaint of dehydration.     reports hx of laryngeal cancer needing surgery with tracheostomy , PEG placement   Completed chemo radiation in 8/24, had trach decannulated and PEG removed last month  F/w [facility] oncology . Went to rehab last month and was dcd to home few weeks ago with progressive weakness and poor appetite    Steroids started for rash    Subjective:  Ms. Alegre seen up in bed, no acute issues overnight  Remains on 2 L , received 3 units PRBC since admission   No significant UOP with ivf and albumin    Feels about the same, fatigued- getting anxious   No fevers  Rash on ext improved some per pt with steroids        Objective:   /69   Pulse 69   Temp 98.2 °F (36.8 °C)   Resp 17   Ht 1.651 m (5' 5\")   Wt 68.4 kg (150 lb 11.2 oz)   SpO2 98%   BMI 25.08 kg/m²     Intake/Output Summary (Last 24 hours) at 12/13/2024 0735  Last data filed at 12/13/2024 0254  Gross per 24 hour   Intake 1096.31 ml   Output 350 ml   Net 746.31 ml       Physical Exam:        General:  elderly female, pale, fatigued and ill appearing  Awake, alert and oriented. Appears to be not in any distress  Mucous Membranes:  Pale , anicteric  Healing small trach opening with no drainage  Neck: No JVD, no carotid bruit, no thyromegaly  Chest:  Clear to auscultation bilaterally,  diminished In bases  Right sided chest port  Cardiovascular:  RRR S1S2 heard, no murmurs or gallops  Abdomen:  Soft, undistended, large ventral hernia reducible  non tender, no organomegaly, BS present  Extremities: trace edema to all ext  Diffuse raised petechiae on elbows, palms and feet noted   Distal pulses well felt  Neurological : grossly normal with gen weakness       Medications:   Scheduled Medications:    folic acid  1 mg Oral Daily    midodrine  10 mg Oral TID WC    
IM Progress Note    Admit Date:  12/10/2024  4    Interval history:  69 y.o. female with a history of laryngeal cancer status post tracheostomy who presented to ED with complaint of dehydration.     reports hx of laryngeal cancer needing surgery with tracheostomy , PEG placement   Completed chemo radiation in 8/24, had trach decannulated and PEG removed last month  F/w [facility] oncology . Went to rehab last month and was dcd to home few weeks ago with progressive weakness and poor appetite    Steroids started for rash    Subjective:  Ms. Alegre seen up in bed, no acute issues overnight    She says it seems like she coughs all the time but that is not new for her.   There was some blood when she coughed this morning.   No vomiting  Diffuse aches  She is on 8 L of oxygen.   Had a BM earlier this week.   Appetite has not been good  Says she thinks she has been urinating.     Objective:   /70   Pulse 83   Temp 97.5 °F (36.4 °C) (Oral)   Resp 20   Ht 1.651 m (5' 5\")   Wt 68.4 kg (150 lb 12.7 oz)   SpO2 97%   BMI 25.09 kg/m²     Intake/Output Summary (Last 24 hours) at 12/14/2024 1017  Last data filed at 12/14/2024 0715  Gross per 24 hour   Intake 923.44 ml   Output 600 ml   Net 323.44 ml       Physical Exam:    General:  elderly female, pale, fatigued and ill appearing  Awake, alert and oriented. Appears to be not in any distress  Mucous Membranes:  Pale , anicteric  Healing small trach opening with no drainage  Neck: No JVD, no carotid bruit, no thyromegaly  Chest:  Clear to auscultation bilaterally,  diminished In bases  Right sided chest port  Cardiovascular:  RRR S1S2 heard, no murmurs or gallops  Abdomen:  Soft, undistended, large ventral hernia reducible  non tender, no organomegaly, BS present  Extremities: trace edema to all ext  Diffuse raised petechiae on elbows, palms and feet noted   Distal pulses well felt  Neurological : grossly normal with gen weakness       Medications:   Scheduled Medications:    
IM Progress Note    Admit Date:  12/10/2024  5    Interval history:  69 y.o. female with a history of laryngeal cancer status post tracheostomy who presented to ED with complaint of dehydration.     reports hx of laryngeal cancer needing surgery with tracheostomy , PEG placement   Completed chemo radiation in 8/24, had trach decannulated and PEG removed last month  F/w [facility] oncology . Went to rehab last month and was dcd to home few weeks ago with progressive weakness and poor appetite    Steroids started for rash  Rash is starting to look better    Subjective:  Ms. Alegre seen up in bed.  Feels about the same today.   Cough is the same today.   Drank ensure this morning.   Rash looks better just since yesterday    Objective:   BP (!) 111/53   Pulse 88   Temp 98.1 °F (36.7 °C) (Oral)   Resp 20   Ht 1.651 m (5' 5\")   Wt 66.7 kg (147 lb 0.8 oz)   SpO2 90%   BMI 24.47 kg/m²     Intake/Output Summary (Last 24 hours) at 12/15/2024 6029  Last data filed at 12/15/2024 0355  Gross per 24 hour   Intake 460 ml   Output 100 ml   Net 360 ml       Physical Exam:    General:  elderly female, pale, fatigued and ill appearing  Awake, alert and oriented. Appears to be not in any distress  Mucous Membranes:  Pale , anicteric  Healing small trach opening with no drainage  Neck: No JVD, no carotid bruit, no thyromegaly  Chest:  Clear to auscultation bilaterally,  diminished In bases  Right sided chest port  Cardiovascular:  RRR S1S2 heard, no murmurs or gallops  Abdomen:  Soft, undistended, large ventral hernia reducible  non tender, no organomegaly, BS present  Extremities: trace edema to all ext  Diffuse raised petechiae on elbows, palms and feet noted   Distal pulses well felt  Neurological : grossly normal with gen weakness       Medications:   Scheduled Medications:    pantoprazole  40 mg Oral QAM AC    albuterol sulfate HFA  2 puff Inhalation 4x Daily RT    busPIRone  10 mg Oral BID    folic acid  1 mg Oral Daily    
IM Progress Note    Admit Date:  12/10/2024  6    Interval history:  69 y.o. female with a history of laryngeal cancer status post tracheostomy who presented to ED with complaint of dehydration.     reports hx of laryngeal cancer needing surgery with tracheostomy , PEG placement   Completed chemo radiation in 8/24, had trach decannulated and PEG removed last month  F/w [facility] oncology . Went to rehab last month and was dcd to home few weeks ago with progressive weakness and poor appetite    Steroids started for rash  Rash is starting to look better    Subjective:  Ms. Alegre seen up in bed.  Feels about the same today.   Cough is the same today.   Drank ensure this morning.   Rash looks better just since yesterday      12/16- complains of pain all over and asking for Dilaudid. ECHO is being done this am. Will likely need bronchoscopy and kidney biopsy.    Objective:   BP (!) 130/91   Pulse 60   Temp 97.5 °F (36.4 °C) (Axillary)   Resp 18   Ht 1.651 m (5' 5\")   Wt 66.7 kg (147 lb 0.8 oz)   SpO2 98%   BMI 24.47 kg/m²     Intake/Output Summary (Last 24 hours) at 12/16/2024 1050  Last data filed at 12/16/2024 0906  Gross per 24 hour   Intake 300 ml   Output 25 ml   Net 275 ml       Physical Exam:    General:  elderly female, pale, fatigued and ill appearing  Awake, alert and oriented. Appears to be not in any distress  Mucous Membranes:  Pale , anicteric  Healing small trach opening with no drainage  Neck: No JVD, no carotid bruit, no thyromegaly  Chest:  Clear to auscultation bilaterally,  diminished In bases  Right sided chest port  Cardiovascular:  RRR S1S2 heard, no murmurs or gallops  Abdomen:  Soft, undistended, large ventral hernia reducible  non tender, no organomegaly, BS present  Extremities: trace edema to all ext  Diffuse raised petechiae on elbows, palms and feet noted   Distal pulses well felt  Neurological : grossly normal with gen weakness       Medications:   Scheduled Medications:    pantoprazole 
IR Consulted for Right Renal Bleed after Biopsy:    - CTA reviewed. No definite active arterial bleeding. Small punctate focus of contrast without delayed contrast pooling, may represent small pseudoaneurysm or interposing parenchyma.    Recommendations:  - No embolization indicated at this time  - Trend H&H Q8H  - Monitor Vitals closely  - Transfuse to maintain Hb> 7-8g%  - IV DDAVP and Reverse Anticoagulants/INR      - Contact IR again if the patient's vitals are becoming unstable or there is a >2g% drop in Hb      Thiago France MD  Vascular and Interventional Radiology    
IV Rituximab finished.     BP (!) 159/88   Pulse 76   Temp 97.3 °F (36.3 °C) (Oral)   Resp 18   Ht 1.651 m (5' 5\")   Wt 69.8 kg (153 lb 14.1 oz)   SpO2 98%   BMI 25.61 kg/m²       Last set of vitals at 1730 as shown. Report given to to Tanesha RN regarding precautions for the next 48 hours concerning body fluids and next set of VS due.   
IV chemo started at 1223 at 50cc/hr. Plan to increase to  100cc/hr at 1323 per order.  
Inpatient Occupational Therapy Treatment    Unit: PCU  Date:  12/15/2024  Patient Name:    Emily Alegre  Admitting diagnosis:  Adult failure to thrive [R62.7]  Dehydration [E86.0]  Hypomagnesemia [E83.42]  Hyponatremia [E87.1]  Ileus (HCC) [K56.7]  Right arm weakness [R29.898]  Failure to thrive in adult [R62.7]  Acute renal failure, unspecified acute renal failure type (HCC) [N17.9]  Anemia, unspecified type [D64.9]  Admit Date:  12/10/2024  Precautions/Restrictions/WB Status/ Lines/ Wounds/ Oxygen: Fall risk, Bed/chair alarm, Lines (IV, Supplemental O2 (6L), and internal catheter), Telemetry, and Continuous pulse oximetry     Pt seen for cotreatment this date due to patient safety, patient endurance, complexity of condition, and acute illness/injury     Treatment Time:  15:25-15:52  Treatment Number:  2  Timed Code Treatment Minutes: 27 minutes  Total Treatment Minutes: 27 minutes    Patient Goals for Therapy: none stated        Discharge Recommendations: SNF  DME needs for discharge: Defer to facility       Therapy recommendations for staff:   Assist of 2 for sitting EOB    History of Present Illness: Per H&P on 12/10/24 from Dr. Mayes:     \"Emily Alegre is a 69 y.o. female with a history of laryngeal cancer status post tracheostomy who presented to ED with complaint of dehydration.     Of note patient currently resides at home previously at a nursing facility.  Patient endorses poor oral intake with associated generalized weakness and body petechia.  States that she is not eating and drinking much complains of associated abdominal pain with some loose stools and bilateral lower extremity edema..  According to daughter, she is concerned that she can no longer take care of herself at home.  Might benefit from long term care.  Blood pressure 96/57 pulse 74 temperature 98.0 respiration 18 saturating 96% on NC oxygen  Labs show sodium of 122 chloride of 87 BUN of 68 creatinine of 4.7 magnesium of 1.46 troponin of 
Inpatient Occupational Therapy Treatment    Unit: PCU  Date:  12/18/2024  Patient Name:    Emily Alegre  Admitting diagnosis:  Adult failure to thrive [R62.7]  Dehydration [E86.0]  Hypomagnesemia [E83.42]  Hyponatremia [E87.1]  Ileus (HCC) [K56.7]  Right arm weakness [R29.898]  Failure to thrive in adult [R62.7]  Acute renal failure, unspecified acute renal failure type (HCC) [N17.9]  Anemia, unspecified type [D64.9]  Admit Date:  12/10/2024  Precautions/Restrictions/WB Status/ Lines/ Wounds/ Oxygen: Fall risk, Bed/chair alarm, Lines (IV, Supplemental O2 (4L), and external catheter), and Telemetry    Pt seen for cotreatment this date due to patient safety, patient endurance, and need for the assistance of 2 skilled therapists    Treatment Time:  2693-1969  Treatment Number:  3  Timed Code Treatment Minutes: 26 minutes  Total Treatment Minutes:  26  minutes    Patient Goals for Therapy: \"none stated\"          Discharge Recommendations: SNF  DME needs for discharge: Defer to facility       Therapy recommendations for staff:   Assist of 2 for sitting EOB    History of Present Illness: Per H&P on 12/10/24 from Dr. Mayes:     \"Emily Alegre is a 69 y.o. female with a history of laryngeal cancer status post tracheostomy who presented to ED with complaint of dehydration.     Of note patient currently resides at home previously at a nursing facility.  Patient endorses poor oral intake with associated generalized weakness and body petechia.  States that she is not eating and drinking much complains of associated abdominal pain with some loose stools and bilateral lower extremity edema..  According to daughter, she is concerned that she can no longer take care of herself at home.  Might benefit from long term care.  Blood pressure 96/57 pulse 74 temperature 98.0 respiration 18 saturating 96% on NC oxygen  Labs show sodium of 122 chloride of 87 BUN of 68 creatinine of 4.7 magnesium of 1.46 troponin of 27 low albumin.  WBC of 
Inpatient Occupational Therapy Treatment    Unit: PCU  Date:  12/23/2024  Patient Name:    Emily Alegre  Admitting diagnosis:  Adult failure to thrive [R62.7]  Dehydration [E86.0]  Hypomagnesemia [E83.42]  Hyponatremia [E87.1]  Ileus (HCC) [K56.7]  Right arm weakness [R29.898]  Failure to thrive in adult [R62.7]  Acute renal failure, unspecified acute renal failure type (HCC) [N17.9]  Anemia, unspecified type [D64.9]  Admit Date:  12/10/2024  Precautions/Restrictions/WB Status/ Lines/ Wounds/ Oxygen: Fall risk, Bed/chair alarm, Lines (IV, Supplemental O2 (4L), external catheter, and port R subclavian, hemodialysis central access R neck), Telemetry, and Continuous pulse oximetry    Pt seen for cotreatment this date due to patient safety, patient endurance, and need for the assistance of 2 skilled therapists    Treatment Time:  844-933  Treatment Number:  5  Timed Code Treatment Minutes: 13 minutes  Total Treatment Minutes:  13  minutes    Patient Goals for Therapy: \"I want to get out of this bed\"          Discharge Recommendations: SNF  DME needs for discharge: Defer to facility       Therapy recommendations for staff:   Assist of 2 for sitting EOB    History of Present Illness: Per H&P on 12/10/24 from Dr. Mayes:     \"Emily Alegre is a 69 y.o. female with a history of laryngeal cancer status post tracheostomy who presented to ED with complaint of dehydration.     Of note patient currently resides at home previously at a nursing facility.  Patient endorses poor oral intake with associated generalized weakness and body petechia.  States that she is not eating and drinking much complains of associated abdominal pain with some loose stools and bilateral lower extremity edema..  According to daughter, she is concerned that she can no longer take care of herself at home.  Might benefit from long term care.  Blood pressure 96/57 pulse 74 temperature 98.0 respiration 18 saturating 96% on NC oxygen  Labs show sodium of 122 
Inpatient Physical Therapy Evaluation & Treatment    Unit: ICU  Date:  12/11/2024  Patient Name:    Emily Alegre  Admitting diagnosis:  Adult failure to thrive [R62.7]  Dehydration [E86.0]  Hypomagnesemia [E83.42]  Hyponatremia [E87.1]  Ileus (HCC) [K56.7]  Right arm weakness [R29.898]  Failure to thrive in adult [R62.7]  Acute renal failure, unspecified acute renal failure type (HCC) [N17.9]  Anemia, unspecified type [D64.9]  Admit Date:  12/10/2024  Precautions/Restrictions/WB Status/ Lines/ Wounds/ Oxygen: Fall risk, Bed/chair alarm, Lines (IV, Supplemental O2 (2L), and internal catheter), Telemetry, and Continuous pulse oximetry; healing trach site; sacral and B heel pressure wounds (stage 2 sacral and Left posterior ankle, stage 1 Right posterior ankle)      Pt seen for cotreatment this date due to patient safety, acute illness/injury, and limited functional status information    Treatment Time:  5426-7230  Treatment Number:  1   Timed Code Treatment Minutes: 39 minutes  Total Treatment Minutes:  49  minutes    Patient Stated Goals for Therapy: Multiple requests by pt for repositioning for comfort          Discharge Recommendations: SNF  DME needs for discharge: Defer to facility       Therapy recommendation for EMS Transport: requires transport by cot due to pt needs lift equipment for safe transfers, pt needs A x 2 for safe transfers, and pt with poor sitting balance/tolerance    Therapy recommendations for staff:   Assist of 2 for repositioning in bed; skylift with Ax2 for transfers    History of Present Illness:   Per H&P on 12/10/24 from Dr. Mayes:    \"Emily Alegre is a 69 y.o. female with a history of laryngeal cancer status post tracheostomy who presented to ED with complaint of dehydration.     Of note patient currently resides at home previously at a nursing facility.  Patient endorses poor oral intake with associated generalized weakness and body petechia.  States that she is not eating and drinking 
Inpatient Physical Therapy Treatment    Unit: PCU  Date:  12/18/2024  Patient Name:    Emily Alegre  Admitting diagnosis:  Adult failure to thrive [R62.7]  Dehydration [E86.0]  Hypomagnesemia [E83.42]  Hyponatremia [E87.1]  Ileus (HCC) [K56.7]  Right arm weakness [R29.898]  Failure to thrive in adult [R62.7]  Acute renal failure, unspecified acute renal failure type (HCC) [N17.9]  Anemia, unspecified type [D64.9]  Admit Date:  12/10/2024  Precautions/Restrictions/WB Status/ Lines/ Wounds/ Oxygen: Fall risk, Bed/chair alarm, Lines (IV, Supplemental O2 (4L), and external catheter), Telemetry, and Continuous pulse oximetry      Pt seen for cotreatment this date due to staff recommendation and need for the assistance of 2 skilled therapists    Treatment Time:  4322-1476  Treatment Number:  3   Timed Code Treatment Minutes: 26 minutes  Total Treatment Minutes:  26  minutes    Patient Stated Goals for Therapy: \" To have less pain. \"          Discharge Recommendations: SNF  DME needs for discharge: Defer to facility       Therapy recommendation for EMS Transport: requires transport by cot due to pt needs lift equipment for safe transfers, pt needs A x 2 for safe transfers, and pt with poor sitting balance/tolerance    Therapy recommendations for staff:   Assist of 2 for repositioning in bed    History of Present Illness:   Per H&P on 12/10/24 from Dr. Mayes:     \"Emily Alegre is a 69 y.o. female with a history of laryngeal cancer status post tracheostomy who presented to ED with complaint of dehydration.     Of note patient currently resides at home previously at a nursing facility.  Patient endorses poor oral intake with associated generalized weakness and body petechia.  States that she is not eating and drinking much complains of associated abdominal pain with some loose stools and bilateral lower extremity edema..  According to daughter, she is concerned that she can no longer take care of herself at home.  Might 
Inpatient Physical Therapy Treatment    Unit: PCU  Date:  12/19/2024  Patient Name:    Emily Alegre  Admitting diagnosis:  Adult failure to thrive [R62.7]  Dehydration [E86.0]  Hypomagnesemia [E83.42]  Hyponatremia [E87.1]  Ileus (HCC) [K56.7]  Right arm weakness [R29.898]  Failure to thrive in adult [R62.7]  Acute renal failure, unspecified acute renal failure type (HCC) [N17.9]  Anemia, unspecified type [D64.9]  Admit Date:  12/10/2024  Precautions/Restrictions/WB Status/ Lines/ Wounds/ Oxygen: Fall risk, Bed/chair alarm, Lines (IV, Supplemental O2 (4L), and external catheter), Telemetry, and Continuous pulse oximetry      Pt seen for cotreatment this date due to patient safety, patient endurance, complexity of condition, acute illness/injury, staff recommendation, and need for the assistance of 2 skilled therapists    Treatment Time:  7512-0119  Treatment Number:  4   Timed Code Treatment Minutes: 32 minutes  Total Treatment Minutes:  32  minutes    Patient Stated Goals for Therapy: \" To eat something! \"          Discharge Recommendations: SNF  DME needs for discharge: Defer to facility       Therapy recommendation for EMS Transport: requires transport by cot due to pt needs lift equipment for safe transfers, pt needs A x 2 for safe transfers, and pt with poor sitting balance/tolerance    Therapy recommendations for staff:   Assist of 2 for sitting EOB    History of Present Illness:   Per H&P on 12/10/24 from Dr. Mayes:     \"Emily Alegre is a 69 y.o. female with a history of laryngeal cancer status post tracheostomy who presented to ED with complaint of dehydration.     Of note patient currently resides at home previously at a nursing facility.  Patient endorses poor oral intake with associated generalized weakness and body petechia.  States that she is not eating and drinking much complains of associated abdominal pain with some loose stools and bilateral lower extremity edema..  According to daughter, she is 
Lab Called- Pt is JUDY positive.     Sri serve to Dr Low Fierro serve to Dr El  To inform both of above.     Pt states she uses albuterol at home- no medications at home.   Pt denies Tylenol.    Lab to draw blood for recheck  
MD wants pt to try other pain medication before rx something stronger. Pt did take the [place] results pending.  
Message to Dr Duffy to inform of Na 119.  
Notified by RN that pt c/o severe left shoulder pain from a prior rotator cuff injury.  Requesting additional pain medications.  Current pain management measures reviewed.      Given underlying co-morbidities, will increase time interval of Norco from q6h to q4h.    Recommend close monitoring of airway as pt currently on HFNC.  Pt should be reassessed in AM.      Yonny Govea MD   Hospitalist      
Nursing handoff to CHUCK Carranza.  Eulalia James RN  
Occupational Therapy/Physical Therapy    Pt off floor for dialysis.  Will recheck as pt tolerates and schedule allows.    Tosha Piedra, OTR/L #[id number]  Aurelio Alford PT, DPT [id number]    
Order for tunneled dialysis catheter. PT / INR ordered. Keep pt NPO. Will plan to do possibly this ROCKY. Viviana WOODS aware.   
Original chemotherapy orders reviewed and acknowledged. Appropriateness of chemotherapy treatment regimen Rituxan for diagnosis of ANCA vasculitis was verified.  Patient educated on chemotherapy regimen.  Acknowledgement of informed consent for chemotherapy obtained.  Pt height, weight, and BSA verified.  Appropriate dosing calculations of chemotherapy based on height, weight, and BSA verified.      Administration: Chemotherapy drug Rituxan independently verified with CHUCK Greenwood prior to administration.  Original order, appropriateness of regimen, drug supplied, height, weight, BSA, dose calculations, expiration dates/times, drug appearance, and two patient identifiers were verified by both RNs.  Drug checked for vesicant/irritant status and for risk of hypersensitivity.  Most recent laboratory values and allergies, were reviewed.  Appropriate IV access available: Positive, brisk blood return via CVC was confirmed prior to administration. Chest x-ray for correct line placement reviewed  Vijay Palacios RN and Brooke WOODS verified correct rate of chemotherapy and maintenance IV fluids.  Patient was educated on chemotherapy regimen prior to administration including indication for treatment related to disease & side effects of chemotherapy drug.  Patient verbalizes understanding of all instructions.    Completion of Chemotherapy: Monitoring during infusion done per policy, see Flowsheets.  Blood return verified before, during, and after infusion per policy; no signs of extravasation.  Pt tolerating chemotherapy well and without incident.  Chemotherapy infusion end time on the MAR.  Will continue to monitor.    
PRBC transfusion complete. No S/S of reaction noted.     VSS: /61, HR 61, MAP 77, RR 14, Spo2 97    Repeat H/H at 0400    Electronically signed by Daniella Humphries RN on 12/13/2024 at 3:08 AM    
Patient continues to have short term memory loss this shift. Repeatedly calls out and forgets she has just called out. Answers orientation questions appropriately, but very forgetful. Patient not motivated to participate in care. Patient also very demanding and rude with staff during care.     Patient family updated x3 during this shift. Patient received pain medication x2 this shift.   
Patient educated on discharge instructions as well as new medications use, dosage, administration and possible side effects.  Patient verified knowledge. IV removed without difficulty and dry dressing in place. Telemetry monitor removed and returned to CMU. Pt left facility in stable condition to Rehab with all of their personal belongings.    
Patient has met criteria to be transferred back to [facility]. Blood infusion stopped, IV flushed with normal saline. CMU is able to see patient on her monitor.   
Patient is not able to demonstrate the ability to move from a reclining position to an upright position within the recliner due to increased weakness.  4 Eyes Skin Assessment     NAME:  Emily Alegre  YOB: 1955  MEDICAL RECORD NUMBER:  [id number]    The patient is being assessed for  Shift Handoff    I agree that at least one RN has performed a thorough Head to Toe Skin Assessment on the patient. ALL assessment sites listed below have been assessed.      Areas assessed by both nurses:    Head, Face, Ears, Shoulders, Back, Chest, Arms, Elbows, Hands, Sacrum. Buttock, Coccyx, Ischium, Legs. Feet and Heels, and Under Medical Devices         Does the Patient have a Wound? Yes wound(s) were present on assessment. LDA wound assessment was Initiated and completed by RN     Scattered petechia and stage II on sacrum                                        Romario Prevention initiated by RN: Yes  Wound Care Orders initiated by RN: No    Pressure Injury (Stage 3,4, Unstageable, DTI, NWPT, and Complex wounds) if present, place Wound referral order by RN under : No    New Ostomies, if present place, Ostomy referral order under : No     Nurse 1 eSignature: Electronically signed by Daniella Humphries RN on 12/13/24 at 5:08 AM EST    **SHARE this note so that the co-signing nurse can place an eSignature**    Nurse 2 eSignature: Electronically signed by Rox Davila RN on 12/13/24 at 5:11 AM EST      
Patient refused Bactroban nasal ointment. Educated patient on use for prevention for bacterial  growth. She still continued to refuse.    Electronically signed by Daniella Humphries RN on 12/12/2024 at 6:26 AM    
Patient refused vitals at this time, Pt states we always wake her up when she falls asleep, will try to take vitals again in about an hour  
Patient resting in bed, patient is pink, warm, and dry. Respirations E/E on 4l/m/nc.  Iv and port site unremarkable. No S/S of acute distress noted. Head to toe completed at this time. Patient is A/O x4. Medications given patient tolerated well. Call light in easy reach, patient reminded to use call light for needs and assistance. Bed locked and in lowest position side rails up x2.   
Patient resting in bed, patient is pink, warm, and dry. Respirations E/E on 4l/m/nc. Iv site unremarkable. No S/S of acute distress noted. Head to toe completed at this time. Patient is A/O x4. Medication given at this time patient tolerated well. Call light in easy reach, patient reminded to use call light for needs and assistance. Bed locked and in lowest position side rails up x2.   
Patient returned from [facility]. Writer asked for blood documentation to be completed. CHUCK Graff states blood transfusion was completed in IR.   Writer called IR and spoke with CHUCK Wliloughby. Fartun states she marked complete and placed a note. Chart has the transfusion not completed. Fartun states blood was completed at 1432.   Writer completed out the transfusion in the flow sheet and made a note completed in IR.     No recollect to check H&H was completed.     Patient back to the unit at 1546. Orders for Post transfusion and obtained post H&H.   
Patient still off floor for HD.   
Patient taken off the unit for dialysis.  
Phase I (PACU)  1.  Patient is identified using name and the date of birth.  2.  The patient is free from signs and symptoms of chemical, electrical, laser, radiation, positioning, or transfer/transport injury.  3.  The patient receives appropriate medication(s), safely administered during the Perioperative period.  4.  The patient has wound/tissue perfusion consistent with or improved from baseline levels established preoperatively.  5.  The patient is at or returning to normothermia at the conclusion of the immediate postoperative period.  6.  The patient's fluid, electrolyte, and acid base balances are consistent with or improved from baseline levels established preoperatively.  7.  The patient's pulmonary function is consistent with or improved from baseline levels established preoperatively.  8.  The patient's cardiovascular status is consistent with or improved from baseline levels established preoperatively.  9.  The patient/caregiver participates in decisions affecting his or her Perioperative plan of care.  10.  The patient's care is consistent with the individualized Perioperative plan of care.  11.  The patient's right to privacy is maintained.  12.  The patient is the recipient of competent and ethical care within legal standards of practice.  13.  The patient's value system, lifestyle, ethnicity, and culture are considered, respected, and incorporated in the Perioperative plan of care.  14.  The patient demonstrates and/or reports adequate pain control throughout the the Perioperative period.  15.  The patient's neurological status is consistent with or improved from baseline levels established preoperatively.  16.  The patient/caregiver demonstrates knowledge of the expected responses to the operative or invasive procedure.  17.  Patient/Caregiver has reduced anxiety.  Interventions- Familiarize with environment and equipment.    
Physical Therapy    Pt currently on caseload. Hold tx today per RN. Pt is refusing services. Will continue to follow. Re-attempt as appropriate and schedule permits.     Remy Dolan, PT, DPT   Carlos Kendrick, OTR/L #[id number]   
Physical Therapy/Occupational Therapy  Attempted PT/OT this afternoon. Pt off floor for cath and then dialysis afterwards. Hold for today per RN with plans to attempt this weekend.     Aurelio Alford PT, DPT [id number]   Carlos Kendrick, OTR/L    
Physical Therapy/Occupational Therapy  Attempted PT/OT with pt this afternoon. Pt politely declined as she had a Bronch completed earlier today and wanted to rest. Will re-attempt tomorrow as pt's status and schedule permits.    Aurelio Alford PT, DPT [id number]  Tosha Piedra, OTR/L #[id number]     
Prior to receiving transfusion of blood products, patient educated on the following:    Blood product transfusion process  Benefits of receiving blood product transfusion  Risks associated with receiving the transfusion  Signs and symptoms of complications associated with blood product transfusion  Vague, uneasy feeling  Onset of pain (especially at the IV site, back, or chest)  Chills  Flushing  Fever  Nausea  Dizziness  Rash  Itching  Dark or red urine  Instructed patient to notify RN immediately if any sign or symptom occurs     
Prior to receiving transfusion of blood products, patient educated on the following:    Blood product transfusion process  Benefits of receiving blood product transfusion  Risks associated with receiving the transfusion  Signs and symptoms of complications associated with blood product transfusion  Vague, uneasy feeling  Onset of pain (especially at the IV site, back, or chest)  Chills  Flushing  Fever  Nausea  Dizziness  Rash  Itching  Dark or red urine  Instructed patient to notify RN immediately if any sign or symptom occurs      
Prior to receiving transfusion of blood products, patient educated on the following:    Blood product transfusion process  Benefits of receiving blood product transfusion  Risks associated with receiving the transfusion  Signs and symptoms of complications associated with blood product transfusion  Vague, uneasy feeling  Onset of pain (especially at the IV site, back, or chest)  Chills  Flushing  Fever  Nausea  Dizziness  Rash  Itching  Dark or red urine  Instructed patient to notify RN immediately if any sign or symptom occurs    Blood reached patient at 2357  Patient observed by this nurse at bedside for 15 minutes after blood reached patient.    Electronically signed by Daniella Humphries RN on 12/12/2024 at 11:57 PM    
Prior to receiving transfusion of blood products, patient educated on the following:    Blood product transfusion process  Benefits of receiving blood product transfusion  Risks associated with receiving the transfusion  Signs and symptoms of complications associated with blood product transfusion  Vague, uneasy feeling  Onset of pain (especially at the IV site, back, or chest)  Chills  Flushing  Fever  Nausea  Dizziness  Rash  Itching  Dark or red urine  Instructed patient to notify RN immediately if any sign or symptom occurs    Consent previous obtained and writer verified in soft chart.   
Pt a/o in stable condition, going back to floor with transport. CMU able to see the pt on the monitor.   
Pt awake, asking for pain med.  States she is hurting all over, especially her feet.  Dilaudid given as ordered.  Pt voiding via external catheter, urine tea colored.   Call light in reach.    Vitals stable.   
Pt c/o pain all over, moaning, states her butt hurts, her feet and legs hurt, rates pain a \"10\", asking for pain medicine.  No pain med ordered.  Hospitalist messaged. Asking for pain med.  
Pt c/o right jaw pain. Pt has 1 tooth that is broken off at the gum line and 1 tooth that looks like it might be rotten. Pt states that the Percocet makes her itch. Dr. Kelsey updated and new order for Hydrocodone received. Percocet put on allergie list.  
Pt completed 3 hours of hemodialysis and removed 1 liter of fluid net. Catheter functioned well at 350ml/min blood flow rate and dwelled with heparin post.        12/22/24 1140 12/22/24 1510   Vital Signs   Temp 98 °F (36.7 °C) 98.4 °F (36.9 °C)   Respirations 18 20   /70 138/78   Height and Weight   Weight - Scale 73.4 kg (161 lb 13.1 oz) 72.4 kg (159 lb 9.8 oz)   Weight Method Bed scale Bed scale     
Pt completed 3 hours of hemodialysis and removed 2 liters of fluid net. Vascular catheter functioned well  at 350ml/min and dwelled with heparin post treatment.       12/16/24 1055 12/16/24 1415   Vital Signs   Temp 97.8 °F (36.6 °C) 98 °F (36.7 °C)   Pulse 66 82   Respirations 16 16   /70 (!) 146/84   Height and Weight   Weight - Scale 66.7 kg (147 lb 0.8 oz) 64.8 kg (142 lb 13.7 oz)     
Pt coughed up large amount of bloody yellow/white expectorant. Pt felt very nauseous afterwards and coughed multiple times with expectorant each time. Zofran given per PRN order with results pending had to bump up oxygen sating down in the 80's Hi flow 8 LPM  
Pt does not meet the criteria for PRN electrolyte replacement as CrCl <30. Orders for PRN electrolyte replacement were discontinued and may be ordered individually as needed. Pharmacy will continue to monitor the patient.     Alyssia Simpson PharmD, [facility], 12/11/2024 12:28 AM    
Pt in Endo for bronchoscopy. VSS. CMU aware.   
Pt in SDS. Pt placed on bedside cardiac monitor. CMU notified via walkie by primary RN Elva. Elva RN and writer at bedside. Blood transfusion initiated at this time.   
Pt instructed to sydni.  
Pt okay to be given contrast per MD Gould due to severe risk for bleeding. Benefit outweighs risk. Approved by radiologist and renal MD.  
Pt resting quietly in SDS, snoring occasionally.  Pt accessed first 15 min of transfusion, writer with pt at bedside for entire period. Pt not showing any S/S of transfusion reaction. Pt denies feelings of SOB or CP.  IR CHUCK Patel notified that blood transfusion was started when pt arrived to [facility] for pre procedure check-in.  
Pt resting with eyes closed.  Appears asleep.  Vitals stable.     
Pt was transported from IR to Dialysis. Pt will be transported to [facility]  on PCU after dialysis treatment. Report given to CHUCK Feldman on PCU.  
Pt. Refused in cleaning to [place].  
Pulmonary & Critical Care Medicine ICU Progress Note    CC: Hyponatremia    Events of Last 24 hours:   States that her shortness of breath has improved and she is down to 4 L which she states she is at  No more hemoptysis.  Grow Pseudomonas    Intake/Output Summary (Last 24 hours) at 12/23/2024 0901  Last data filed at 12/22/2024 2145  Gross per 24 hour   Intake 674 ml   Output --   Net 674 ml       IV:   sodium chloride       Vitals:  /76   Pulse 76   Temp 97.4 °F (36.3 °C) (Axillary)   Resp 16   Ht 1.651 m (5' 5\")   Wt 72.4 kg (159 lb 9.8 oz)   SpO2 98%   BMI 26.56 kg/m²   on 4L  Constitutional:  No acute distress.   Eyes: PERRL. Conjunctivae anicteric.   ENT: Normal nose. Normal tongue.    Neck:  Trachea is midline.   Respiratory: No accessory muscle usage.   No wheezes. + rales.  Few rhonchi.  Cardiovascular: Normal S1S2. No digit clubbing. No digit cyanosis. No LE edema.    Gastrointestinal: No mass palpated. No tenderness palpated.   Skin:  purpuric rash on her legs more than her hands and arms -much improving  Psychiatric: No anxiety or Agitation. Alert and Oriented to person, place and time.    Scheduled Meds:   ciprofloxacin  500 mg Oral Daily    ipratropium 0.5 mg-albuterol 2.5 mg  1 Dose Inhalation TID RT    senna  1 tablet Oral Nightly    predniSONE  60 mg Oral Daily    sulfamethoxazole-trimethoprim  0.5 tablet Oral Once per day on Monday Wednesday Friday    oyster shell calcium w/D  1 tablet Oral Daily    pantoprazole (PROTONIX) 40 mg in sodium chloride (PF) 0.9 % 10 mL injection  40 mg IntraVENous Daily    busPIRone  10 mg Oral BID    folic acid  1 mg Oral Daily    midodrine  10 mg Oral TID WC    sodium chloride flush  5-40 mL IntraVENous 2 times per day    [Held by provider] heparin (porcine)  5,000 Units SubCUTAneous 3 times per day    polyethylene glycol  17 g Oral BID    lidocaine  1 patch TransDERmal Daily     PRN Meds:  albumin human 25%, HYDROcodone 5 mg - acetaminophen, perflutren 
RN called up and spoke to unit clerk on PCU to let patient's RN know that she needs to have an h and h drawn at 1420.  
RN in chart looking up information for upcoming procedure with IR.   
RT Inhaler-Nebulizer Bronchodilator Protocol Note    There is a bronchodilator order in the chart from a provider indicating to follow the RT Bronchodilator Protocol and there is an “Initiate RT Inhaler-Nebulizer Bronchodilator Protocol” order as well (see protocol at bottom of note).    CXR Findings:  No results found.    The findings from the last RT Protocol Assessment were as follows:   History Pulmonary Disease: (P) Chronic pulmonary disease  Respiratory Pattern: (P) Dyspnea on exertion or RR 21-25 bpm  Breath Sounds: (P) Slightly diminished and/or crackles  Cough: (P) Strong, productive  Indication for Bronchodilator Therapy: (P) Decreased or absent breath sounds  Bronchodilator Assessment Score: (P) 7    Aerosolized bronchodilator medication orders have been revised according to the RT Inhaler-Nebulizer Bronchodilator Protocol below.    Respiratory Therapist to perform RT Therapy Protocol Assessment initially then follow the protocol.  Repeat RT Therapy Protocol Assessment PRN for score 0-3 or on second treatment, BID, and PRN for scores above 3.    No Indications - adjust the frequency to every 6 hours PRN wheezing or bronchospasm, if no treatments needed after 48 hours then discontinue using Per Protocol order mode.     If indication present, adjust the RT bronchodilator orders based on the Bronchodilator Assessment Score as indicated below.  Use Inhaler orders unless patient has one or more of the following: on home nebulizer, not able to hold breath for 10 seconds, is not alert and oriented, cannot activate and use MDI correctly, or respiratory rate 25 breaths per minute or more, then use the equivalent nebulizer order(s) with same Frequency and PRN reasons based on the score.  If a patient is on this medication at home then do not decrease Frequency below that used at home.    0-3 - enter or revise RT bronchodilator order(s) to equivalent RT Bronchodilator order with Frequency of every 4 hours PRN for 
RT Inhaler-Nebulizer Bronchodilator Protocol Note    There is a bronchodilator order in the chart from a provider indicating to follow the RT Bronchodilator Protocol and there is an “Initiate RT Inhaler-Nebulizer Bronchodilator Protocol” order as well (see protocol at bottom of note).    CXR Findings:  No results found.    The findings from the last RT Protocol Assessment were as follows:   History Pulmonary Disease: (P) Chronic pulmonary disease  Respiratory Pattern: (P) Dyspnea on exertion or RR 21-25 bpm  Breath Sounds: (P) Slightly diminished and/or crackles  Cough: (P) Strong, spontaneous, non-productive  Indication for Bronchodilator Therapy: (P) Decreased or absent breath sounds  Bronchodilator Assessment Score: (P) 6    Aerosolized bronchodilator medication orders have been revised according to the RT Inhaler-Nebulizer Bronchodilator Protocol below.    Respiratory Therapist to perform RT Therapy Protocol Assessment initially then follow the protocol.  Repeat RT Therapy Protocol Assessment PRN for score 0-3 or on second treatment, BID, and PRN for scores above 3.    No Indications - adjust the frequency to every 6 hours PRN wheezing or bronchospasm, if no treatments needed after 48 hours then discontinue using Per Protocol order mode.     If indication present, adjust the RT bronchodilator orders based on the Bronchodilator Assessment Score as indicated below.  Use Inhaler orders unless patient has one or more of the following: on home nebulizer, not able to hold breath for 10 seconds, is not alert and oriented, cannot activate and use MDI correctly, or respiratory rate 25 breaths per minute or more, then use the equivalent nebulizer order(s) with same Frequency and PRN reasons based on the score.  If a patient is on this medication at home then do not decrease Frequency below that used at home.    0-3 - enter or revise RT bronchodilator order(s) to equivalent RT Bronchodilator order with Frequency of every 4 
RT Inhaler-Nebulizer Bronchodilator Protocol Note    There is a bronchodilator order in the chart from a provider indicating to follow the RT Bronchodilator Protocol and there is an “Initiate RT Inhaler-Nebulizer Bronchodilator Protocol” order as well (see protocol at bottom of note).    CXR Findings:  No results found.    The findings from the last RT Protocol Assessment were as follows:   History Pulmonary Disease: Chronic pulmonary disease  Respiratory Pattern: Regular pattern and RR 12-20 bpm  Breath Sounds: Slightly diminished and/or crackles  Cough: Strong, spontaneous, non-productive  Indication for Bronchodilator Therapy: Decreased or absent breath sounds  Bronchodilator Assessment Score: 4    Aerosolized bronchodilator medication orders have been revised according to the RT Inhaler-Nebulizer Bronchodilator Protocol below.    Respiratory Therapist to perform RT Therapy Protocol Assessment initially then follow the protocol.  Repeat RT Therapy Protocol Assessment PRN for score 0-3 or on second treatment, BID, and PRN for scores above 3.    No Indications - adjust the frequency to every 6 hours PRN wheezing or bronchospasm, if no treatments needed after 48 hours then discontinue using Per Protocol order mode.     If indication present, adjust the RT bronchodilator orders based on the Bronchodilator Assessment Score as indicated below.  Use Inhaler orders unless patient has one or more of the following: on home nebulizer, not able to hold breath for 10 seconds, is not alert and oriented, cannot activate and use MDI correctly, or respiratory rate 25 breaths per minute or more, then use the equivalent nebulizer order(s) with same Frequency and PRN reasons based on the score.  If a patient is on this medication at home then do not decrease Frequency below that used at home.    0-3 - enter or revise RT bronchodilator order(s) to equivalent RT Bronchodilator order with Frequency of every 4 hours PRN for wheezing or 
RT Inhaler-Nebulizer Bronchodilator Protocol Note    There is a bronchodilator order in the chart from a provider indicating to follow the RT Bronchodilator Protocol and there is an “Initiate RT Inhaler-Nebulizer Bronchodilator Protocol” order as well (see protocol at bottom of note).    CXR Findings:  No results found.    The findings from the last RT Protocol Assessment were as follows:   History Pulmonary Disease: Chronic pulmonary disease  Respiratory Pattern: Regular pattern and RR 12-20 bpm  Breath Sounds: Slightly diminished and/or crackles  Cough: Strong, spontaneous, non-productive  Indication for Bronchodilator Therapy: Decreased or absent breath sounds  Bronchodilator Assessment Score: 4    Aerosolized bronchodilator medication orders have been revised according to the RT Inhaler-Nebulizer Bronchodilator Protocol below.    Respiratory Therapist to perform RT Therapy Protocol Assessment initially then follow the protocol.  Repeat RT Therapy Protocol Assessment PRN for score 0-3 or on second treatment, BID, and PRN for scores above 3.    No Indications - adjust the frequency to every 6 hours PRN wheezing or bronchospasm, if no treatments needed after 48 hours then discontinue using Per Protocol order mode.     If indication present, adjust the RT bronchodilator orders based on the Bronchodilator Assessment Score as indicated below.  Use Inhaler orders unless patient has one or more of the following: on home nebulizer, not able to hold breath for 10 seconds, is not alert and oriented, cannot activate and use MDI correctly, or respiratory rate 25 breaths per minute or more, then use the equivalent nebulizer order(s) with same Frequency and PRN reasons based on the score.  If a patient is on this medication at home then do not decrease Frequency below that used at home.    0-3 - enter or revise RT bronchodilator order(s) to equivalent RT Bronchodilator order with Frequency of every 4 hours PRN for wheezing or 
RT Inhaler-Nebulizer Bronchodilator Protocol Note    There is a bronchodilator order in the chart from a provider indicating to follow the RT Bronchodilator Protocol and there is an “Initiate RT Inhaler-Nebulizer Bronchodilator Protocol” order as well (see protocol at bottom of note).    CXR Findings:  No results found.    The findings from the last RT Protocol Assessment were as follows:   History Pulmonary Disease: Chronic pulmonary disease  Respiratory Pattern: Regular pattern and RR 12-20 bpm  Breath Sounds: Slightly diminished and/or crackles  Cough: Weak, non-productive  Indication for Bronchodilator Therapy: Decreased or absent breath sounds  Bronchodilator Assessment Score: 7    Aerosolized bronchodilator medication orders have been revised according to the RT Inhaler-Nebulizer Bronchodilator Protocol below.    Respiratory Therapist to perform RT Therapy Protocol Assessment initially then follow the protocol.  Repeat RT Therapy Protocol Assessment PRN for score 0-3 or on second treatment, BID, and PRN for scores above 3.    No Indications - adjust the frequency to every 6 hours PRN wheezing or bronchospasm, if no treatments needed after 48 hours then discontinue using Per Protocol order mode.     If indication present, adjust the RT bronchodilator orders based on the Bronchodilator Assessment Score as indicated below.  Use Inhaler orders unless patient has one or more of the following: on home nebulizer, not able to hold breath for 10 seconds, is not alert and oriented, cannot activate and use MDI correctly, or respiratory rate 25 breaths per minute or more, then use the equivalent nebulizer order(s) with same Frequency and PRN reasons based on the score.  If a patient is on this medication at home then do not decrease Frequency below that used at home.    0-3 - enter or revise RT bronchodilator order(s) to equivalent RT Bronchodilator order with Frequency of every 4 hours PRN for wheezing or increased work 
RT Inhaler-Nebulizer Bronchodilator Protocol Note    There is a bronchodilator order in the chart from a provider indicating to follow the RT Bronchodilator Protocol and there is an “Initiate RT Inhaler-Nebulizer Bronchodilator Protocol” order as well (see protocol at bottom of note).    CXR Findings:  XR CHEST PORTABLE    Result Date: 12/14/2024  Extensive bilateral airspace disease, new from the prior examination. Differential diagnosis includes pulmonary edema versus multifocal pneumonia.       The findings from the last RT Protocol Assessment were as follows:   History Pulmonary Disease: (P) Chronic pulmonary disease  Respiratory Pattern: (P) Dyspnea on exertion or RR 21-25 bpm  Breath Sounds: (P) Slightly diminished and/or crackles  Cough: (P) Strong, spontaneous, non-productive  Indication for Bronchodilator Therapy: (P) Decreased or absent breath sounds  Bronchodilator Assessment Score: (P) 6    Aerosolized bronchodilator medication orders have been revised according to the RT Inhaler-Nebulizer Bronchodilator Protocol below.    Respiratory Therapist to perform RT Therapy Protocol Assessment initially then follow the protocol.  Repeat RT Therapy Protocol Assessment PRN for score 0-3 or on second treatment, BID, and PRN for scores above 3.    No Indications - adjust the frequency to every 6 hours PRN wheezing or bronchospasm, if no treatments needed after 48 hours then discontinue using Per Protocol order mode.     If indication present, adjust the RT bronchodilator orders based on the Bronchodilator Assessment Score as indicated below.  Use Inhaler orders unless patient has one or more of the following: on home nebulizer, not able to hold breath for 10 seconds, is not alert and oriented, cannot activate and use MDI correctly, or respiratory rate 25 breaths per minute or more, then use the equivalent nebulizer order(s) with same Frequency and PRN reasons based on the score.  If a patient is on this medication 
RT Inhaler-Nebulizer Bronchodilator Protocol Note    There is a bronchodilator order in the chart from a provider indicating to follow the RT Bronchodilator Protocol and there is an “Initiate RT Inhaler-Nebulizer Bronchodilator Protocol” order as well (see protocol at bottom of note).    CXR Findings:  XR CHEST PORTABLE    Result Date: 12/14/2024  Extensive bilateral airspace disease, new from the prior examination. Differential diagnosis includes pulmonary edema versus multifocal pneumonia.       The findings from the last RT Protocol Assessment were as follows:   History Pulmonary Disease: Chronic pulmonary disease  Respiratory Pattern: Dyspnea on exertion or RR 21-25 bpm  Breath Sounds: Slightly diminished and/or crackles  Cough: Strong, spontaneous, non-productive  Indication for Bronchodilator Therapy: Decreased or absent breath sounds  Bronchodilator Assessment Score: 6    Aerosolized bronchodilator medication orders have been revised according to the RT Inhaler-Nebulizer Bronchodilator Protocol below.    Respiratory Therapist to perform RT Therapy Protocol Assessment initially then follow the protocol.  Repeat RT Therapy Protocol Assessment PRN for score 0-3 or on second treatment, BID, and PRN for scores above 3.    No Indications - adjust the frequency to every 6 hours PRN wheezing or bronchospasm, if no treatments needed after 48 hours then discontinue using Per Protocol order mode.     If indication present, adjust the RT bronchodilator orders based on the Bronchodilator Assessment Score as indicated below.  Use Inhaler orders unless patient has one or more of the following: on home nebulizer, not able to hold breath for 10 seconds, is not alert and oriented, cannot activate and use MDI correctly, or respiratory rate 25 breaths per minute or more, then use the equivalent nebulizer order(s) with same Frequency and PRN reasons based on the score.  If a patient is on this medication at home then do not 
Reassessment completed (see Flowsheet). All ICU lines remain intact, ICU monitoring continued-     Infusing:  NS (See MAR).    Pt is C/o feeling hungry- Call made to SLP to verify time when seeing patient.     Pt remains on 2L NC- baseline.   Lung sounds Clear/ Diminished  Pt's blood pressures WDL.            
Reassessment completed (see Flowsheet). PT/OT completed at bedside. Pt intermittently anxious.   Family has been notified of POC.   
Report called to [email or web address].  
Report for procedure from Luanne WOODS.  
Report given to Amairani WOODS. Pt. Stable. Care transferred at this time.   
Report given to Fartun WOODS at bedside.   
Shift assessment complete. See flow sheet. Scheduled meds given. See MAR.    Patients head-toe complete, VS are logged, and active bowel sound noted in all four quadrants.    No further needs noted at this time. Call light and bedside table are within reach. The bed is locked and is in the lowest position.  
Shift assessment was completed (see flow sheet). Pt is A/O, complains of generalized pain- all over. PRN medication to follow(see MAR),     Respirations are even, unlabored, with Clear/diminished sounds. On 2L O2 via NC - baseline.  Scheduled medications to follow- crushed with Pudding (patients preference).     Infusing:  NS (See MAR),    Call light within reach. Bed in lowest position. Bed alarm on.     Patient is not able to demonstrated the ability to move from a reclining position to an upright position within the recliner. however patient is alert, oriented and able to provide informed consent    Call received from Rema Briseno. Updated on POC for the day.     
Shift assessments completed and documented. Scheduled medications administered as ordered. VSS. Care plan and education reviewed with patient. Patient acknowledges understanding. Patient reports all care needs are met at this time and is resting comfortably in bed. Call light and bedside table within reach. Bed locked and in lowest position.       
Shift handoff report given to Daniella WOODS at bedside.   Pt is  Awake and alert  IV handoff completed.   Call light within reach, bed in lowest position, bed alarm on. End of shift checks completed.    Pt has been free of falls for duration of shift. .     
Spoke with Dr. Stern by telephone re:   Latest Reference Range & Units 12/14/24 04:58   Procalcitonin 0.00 - 0.15 ng/mL 3.63 (H)   (H): Data is abnormally high    He is also aware of CXR results from 12/14/24.  
Spoke with lab the Hep B core antibody total and anti GBM antibody were drawn and sent out late 12/16 and received 12/17 at [facility]. From 12/17 saying results Hep B core antibody total turnaround is up to 48 hours (which would be 12/19) and anti-GBM is  up to 72 hours (which would be12/20)  
Third unit blood transfusing at this time. Blood entered vein at 1711. No s/s of transfusion reactions. No further needs at this time. RN to remain at bedside for first initial 15 minutes.     Tana Lopes RN    
This RN spoke with Anesthesia to assist with Ct Renal Biopsy. Anesthesia agreed and patient scheduled for 12/20/24 at 1300.  Call placed to Lida WOODS in PACU to arrange for recovery of patient. Lida WOODS stated recovery will not be a problem.      Call placed to Primary RN Tanesha on PCU. Gave all the information for procedure and asked for CBC and PT/INR to be drawn tomorrow in AM.  Tanesha verbalized understanding.   
This patient has critical labs hemoglobin 4.8, hematocrit 14.3, messaged Dr. Mayes at this time  
Treatment time: 2 hrs    Net UF: 0 ml     Pre weight: 68.4 kg  Post weight: 68.4 kg     Access used: Rntdc  Access function:  tolerated well,  BFR 350ml/min     Medications or blood products given: heparin dwells     Regular outpatient schedule: MWF     Summary of response to treatment: Pt tolerated well. Pt remained stable throughout entire treatment and upon exiting the hemodialysis suite.      Copy of dialysis treatment record placed in chart, to be scanned into EMR.      
Update called to Suzanna [phone] at the patient's request.  
Verbal report and transfer of care given to CHUCK Bales.     Patient in HD.  
Writer called lab to check if type screen from 12/17 was valid. Per lab teach valid until MN today. Awaiting blood from blood bank.     Called central supply to request the  bag. No answer VM left.     Update:   250 ml NS bag received.   Call from transport - ready to transport to procedural area.   Called IR - updated have not received blood yet. Per IR take to SDS when received.     
Writer met with patient brother Rylan outside of room whom states patient needed the bed acuna. Rylan also requested to be called with updates and the point of contact for patient.     Writer came into the room and offered bed pan. Patient states she does not need the bed pan.     Writer informed patient of Rylan's request for point of contact for updates. Writer discussed with patient regarding point of contact for updates if that is whom she chooses.     Per patient Suzanna is to be the one contacted for updates. If Suzanna is unavailable then call Rylan. Writer informed patient all incoming family calls will be directed to Suzanna for the updates.      called Suzanna and provided update and plan of care for today.     Suzanna updated on the plan of point of contact for today and Suzanna is agreeable to let the family know.   
Writer spoke with IR nurse. IR ask about blood transfusion prior to procedure this afternoon. Per IM, will place transfusion orders.     Awaiting transfusion orders.   
125* 126* 127*   K 4.4 4.5 4.6   CL 93* 94* 95*   CO2 20* 20* 19*   BUN 68* 73* 73*   CREATININE 4.8* 4.7* 4.8*     LIVER PROFILE:   Recent Labs     12/10/24  1904 12/11/24  0501   AST 13* 13*   ALT <5* <5*   BILITOT 0.5 0.5   ALKPHOS 82 89       ASSESSMENT:  Hyponatremia  Decannulated trach in the last month; H/o  laryngeal cancer s/p chemo and radiation  Pulmonary emphysema  Chronic hypoxia on home O2 3L  FELISA  UTI  Anemia  Purpuric rash and FELISA: Possible HSP  RML nodule 7mm; needs Ct  6-12 months     PLAN:  Consider steroids after biopsy or if no biopsy is recommended  Serial sodium  Slowly increase sodium by half a milliequivalent per hour  IVF per renal  Transfused 2nd PRBC  Nephro and oncology following  PT/OT  subcutaneous heparin for DVT prophylaxis   Ok to transfer and I will not plan to follow  
Meal Intake: 0%, 1-25%  Average Supplements Intake: None Ordered  ADULT DIET; Dysphagia - Minced and Moist  ADULT ORAL NUTRITION SUPPLEMENT; Breakfast, Lunch, Dinner; Renal Oral Supplement    Anthropometric Measures:  Height: 165.1 cm (5' 5\")  Ideal Body Weight (IBW): 125 lbs (57 kg)    Admission Body Weight: 67.1 kg (148 lb) (obtained on 12/10/24; actual weight)  Current Body Weight: 72.4 kg (159 lb 9.8 oz) (obtained on 12/22/24; actual weight), 127.7 % IBW. Weight Source: Bed scale  Current BMI (kg/m2): 26.6  Usual Body Weight: 76.6 kg (168 lb 14 oz) (obtained on 9/8/24; actual weight)     % Weight Change (Calculated): -5.5  Weight Adjustment For: No Adjustment                 BMI Categories: Overweight (BMI 25.0-29.9)    Estimated Daily Nutrient Needs:  Energy Requirements Based On: Kcal/kg  Weight Used for Energy Requirements: Current  Energy (kcal/day): 1367 - 1572 kcals based on 20-23 kcals/kg/CBW  Weight Used for Protein Requirements: Ideal  Protein (g/day): 80 - 91 g protein based on 1.4-1.6 g/kg/IBW  Method Used for Fluid Requirements: 1 ml/kcal  Fluid (ml/day): 1367 - 1572 ml    Nutrition Diagnosis:   Severe malnutrition, in context of acute illness or injury related to inadequate protein-energy intake, decreased appetite as evidenced by poor intake prior to admission, lab values, weight loss, patient reported barriers    Nutrition Interventions:   Food and/or Nutrient Delivery: Continue Current Diet, Start Oral Nutrition Supplement  Nutrition Education/Counseling: No recommendation at this time  Coordination of Nutrition Care: Continue to monitor while inpatient, Coordination of Care       Goals:  Goals: PO intake 50% or greater, by next RD assessment  Type of Goal: Continue current goal  Previous Goal Met: No Progress toward Goal(s)    Nutrition Monitoring and Evaluation:   Behavioral-Environmental Outcomes: None Identified  Food/Nutrient Intake Outcomes: Food and Nutrient Intake, Supplement 
acetaminophen, chapstick, albuterol sulfate HFA    Results:  CBC:   Recent Labs     12/15/24  0437 12/16/24 0448 12/17/24 0433   WBC 4.5 6.4 8.1   HGB 7.0* 7.3* 7.1*   HCT 21.1* 22.3* 21.4*   MCV 86.1 88.1 86.5   * 85* 98*     BMP:   Recent Labs     12/15/24  0437 12/16/24  0448 12/17/24  0433   * 132* 132*  132*   K 4.0 4.6 4.4  4.4   CL 98* 96* 95*  95*   CO2 24 23 26  26   BUN 38* 55* 43*  43*   CREATININE 3.2* 3.9* 2.7*  2.7*     LIVER PROFILE:   No results for input(s): \"AST\", \"ALT\", \"LIPASE\", \"AMYLASE\", \"BILIDIR\", \"BILITOT\", \"ALKPHOS\" in the last 72 hours.    Invalid input(s): \"ALB\"      Microbiology:  12/14 respiratory Staphylococcus aureus    Imaging:  CT chest 12/15 imaging was reviewed by me and showed   1. Bilateral pleural effusions with consolidation in the lower lobes.   2. Fibrotic lung changes.   3. Multiple nodules-some are cavitary      ASSESSMENT:  Acute hypoxemic respiratory failure  Cavitary lung nodules. DDx infection, vasculitis and malignancy  ANCA vasculitis   Pulmonary emphysema  Chronic hypoxemic respite failure on 3 L O2  Elevated procalcitonin  FELISA; now on dialysis  Purpuric rash and FELISA: Possible HSP  Anemia acute on chronic post 2 units of PRBCs  Stage IV laryngeal cancer post surgery and chemoradiation completed August 2024.  Required tracheostomy decannulated last month.     PLAN:  Supplemental oxygen to maintain SaO2 >92%; wean as tolerated  Inhaled bronchodilators  Day #6 IV Solu-Medrol - now 1 gram daily   Kidney biopsy planned  Oncology following for anemia  Zosyn IV day #3  Follow-up cultures  Bronch with BAL today. The risks and benefits of Bronchoscopy , alternatives to the procedure and doing nothing have been discussed with patient/family including complications (collapse lung, pneumomediastinum, bleed, infection, mechanical ventilation, hospitalization, cardiopulmonary arrest and death). We also discussed the risks of sedation/anesthesia. It is my 
and Phase II process.  23.  Patient pain level is established preoperatively using age appropriate pain scale.  24.  The patient will move to fall risk upon sedation- during and through the recovery phase.  Interventions- orient the patient to the environment, especially the location of the bathroom; provide treaded socks/non-skid footwear; demonstrate and teach back use of the nurse's call system; instruct the patient to call for help before getting out of bed; lock all movable equipment before transferring patient; keep bed in lowest position possible. 25.  Other:   
chloride, ondansetron **OR** ondansetron, polyethylene glycol, acetaminophen **OR** acetaminophen, chapstick, albuterol sulfate HFA    Results:  CBC:   Recent Labs     12/14/24 0458 12/15/24  0437   WBC 8.3 4.5   HGB 7.4* 7.0*   HCT 21.7* 21.1*   MCV 86.0 86.1    100*     BMP:   Recent Labs     12/14/24 0458 12/15/24  0437 12/16/24  0448   * 134* 132*   K 4.8 4.0 4.6   CL 96* 98* 96*   CO2 23 24 23   BUN 53* 38* 55*   CREATININE 4.0* 3.2* 3.9*     LIVER PROFILE:   No results for input(s): \"AST\", \"ALT\", \"LIPASE\", \"AMYLASE\", \"BILIDIR\", \"BILITOT\", \"ALKPHOS\" in the last 72 hours.    Invalid input(s): \"ALB\"      Microbiology:  12/14 respiratory    Imaging:  CT chest 12/15 imaging was reviewed by me and showed   1. Bilateral pleural effusions with consolidation in the lower lobes.   2. Fibrotic lung changes.   3. Multiple nodules-some are cavitary      ASSESSMENT:  Acute hypoxemic respiratory failure  Cavitary lung nodules. DDx infection, vasculitis and malignancy  Pulmonary emphysema  Chronic hypoxemic respite failure on 3 L O2  Elevated procalcitonin  FELISA; now on dialysis  Purpuric rash and FELISA: Possible HSP  Anemia acute on chronic post 2 units of PRBCs  Stage IV laryngeal cancer post surgery and chemoradiation completed August 2024.  Required tracheostomy decannulated last month.     PLAN:  HFNC for life-threatening acute hypoxemic respiratory failure and titrate to maintain SaO2 >92%  Inhaled bronchodilators  Day #5 IV Solu-Medrol 125 mg daily per nephrology  Kidney biopsy planned  Oncology following for anemia  Zosyn IV day #2  Follow-up cultures  Follow-up serology-Myeloperoxidase Ab neg (C-ANCA/P-ANCA)  N.p.o. for bronchoscopy in a.m. The risks and benefits of Bronchoscopy , alternatives to the procedure and doing nothing have been discussed with patient/family including complications (collapse lung, pneumomediastinum, bleed, infection, mechanical ventilation, hospitalization, cardiopulmonary arrest 
events reported/Chart Review: No new concerns reported  Patient tolerance to current diet and treatment:Pt states grilled cheese was too chewy to eat. Family present and states pt could eat home-cooked grilled cheese. Family brought meatloaf and mashed potatoes from home for supper      Respiratory Status: Pt with SPO2% of 98 on 3 LPM NC with RR of 11-15  Oral Care Status:    Missing/broken teeth  Evidence of dental cavities/carries  Oral Care Completed?   [] Yes   [x] No  Not warranted-oral care adequate upon assessment     Liquid PO Trials:   IDDSI 0 Thin:  Assessed via straw: no anterior bolus loss , suspect functional A-P bolus transit, swallow timing subjectively appears timely, oral clearance grossly WFL, and no clinical s/s of aspiration in 3/3 sips  Solid PO Trials:  IDDSI 7 Regular:   no anterior bolus loss , suspect functional A-P bolus transit, swallow timing subjectively appears timely, oral clearance grossly WFL, and no clinical s/s of aspiration in 1/1 presentations.   Pt required liquid wash to clear regular solid.  Pt declines additional solids at this time.    Education: SLP edu pt re: Role of SLP, Rationale for dysphagia tx, Recommended compensatory strategies, Aspiration precautions, and POC. Pt verbalized understanding and RN aware of recommendations. Education completed re: importance of po intake for increased strength and healing    Assessment:   Pt grossly tolerating recommended diet without clinical s/s of aspiration. Some carryover of recommended compensatory strategies. Pt assessed with PO intake listed above. Pt is sitting approx. 40 degrees upright. SLP encouraged pt to sit up higher. Pt declined d/t pt reported \"stomach spasms\". Pt would lift head higher for PO intake. SLP strongly encourages close monitoring of PO intake to to ensure pt is sitting appropriately upright for PO intake.   Son encouraged pt to eat more and discussed worries regarding PO intake and malnutrition. SLP 
homatropine, heparin (porcine), HYDROcodone 5 mg - acetaminophen, sodium chloride, sodium chloride flush, sodium chloride, ondansetron **OR** ondansetron, polyethylene glycol, acetaminophen **OR** acetaminophen, chapstick, albuterol sulfate HFA    Results:  CBC:   Recent Labs     12/13/24  0410 12/14/24  0458 12/15/24  0437   WBC 7.1 8.3 4.5   HGB 7.7* 7.4* 7.0*   HCT 23.0* 21.7* 21.1*   MCV 86.5 86.0 86.1    144 100*     BMP:   Recent Labs     12/13/24  2341 12/14/24  0458 12/15/24  0437   * 132* 134*   K 4.3 4.8 4.0   CL 96* 96* 98*   CO2 22 23 24   BUN 51* 53* 38*   CREATININE 3.8* 4.0* 3.2*     LIVER PROFILE:   No results for input(s): \"AST\", \"ALT\", \"LIPASE\", \"AMYLASE\", \"BILIDIR\", \"BILITOT\", \"ALKPHOS\" in the last 72 hours.    Invalid input(s): \"ALB\"    12/14/24 Procal 3.63; BNP > 70,000    12/14/24 CXR: There is extensive bilateral airspace disease, new from the prior examination.  There is suggestion of underlying right pleural effusion.  There is a port in place in good position.  Bony structures unremarkable  IMPRESSION:  Extensive bilateral airspace disease, new from the prior examination.  Differential diagnosis includes pulmonary edema versus multifocal pneumonia.    ASSESSMENT:  Hypoxia - now on 6lpm   Abnormal CXR  Hyponatremia  Decannulated trach in the last month; H/o  laryngeal cancer s/p chemo and radiation  Pulmonary emphysema  Chronic hypoxia on home O2 3L  FELISA; now on dialysis  Purpuric rash and FELISA: Possible HSP  RML nodule 7mm; needs Ct  6-12 months     PLAN:  Chest Ct pending today  If the hypoxia does not improve with further volume removal then consider bronchoscopy and chest Ct for further evaluation. Vasculitis and infection would be on the differential given her comorbidities  Titrate supplemental oxygen to maintain SaO2 >92%; wean as tolerated    S/p 2u PRBC  Both her Pro-Singh and BNP are quite elevated which can be seen in FELISA. Procal can to be high with vasculitis as 
increased work of breathing using Per Protocol order mode.        4-6 - enter or revise RT Bronchodilator order(s) to two equivalent RT bronchodilator orders with one order with BID Frequency and one order with Frequency of every 4 hours PRN wheezing or increased work of breathing using Per Protocol order mode.        7-10 - enter or revise RT Bronchodilator order(s) to two equivalent RT bronchodilator orders with one order with TID Frequency and one order with Frequency of every 4 hours PRN wheezing or increased work of breathing using Per Protocol order mode.       11-13 - enter or revise RT Bronchodilator order(s) to one equivalent RT bronchodilator order with QID Frequency and an Albuterol order with Frequency of every 4 hours PRN wheezing or increased work of breathing using Per Protocol order mode.      Greater than 13 - enter or revise RT Bronchodilator order(s) to one equivalent RT bronchodilator order with every 4 hours Frequency and an Albuterol order with Frequency of every 2 hours PRN wheezing or increased work of breathing using Per Protocol order mode.     PATIENT WILL BENEFIT FROM TREATMENTS.     Electronically signed by Lay Hurley RCP on 12/18/2024 at 7:47 PM  
work of breathing using Per Protocol order mode.        4-6 - enter or revise RT Bronchodilator order(s) to two equivalent RT bronchodilator orders with one order with BID Frequency and one order with Frequency of every 4 hours PRN wheezing or increased work of breathing using Per Protocol order mode.        7-10 - enter or revise RT Bronchodilator order(s) to two equivalent RT bronchodilator orders with one order with TID Frequency and one order with Frequency of every 4 hours PRN wheezing or increased work of breathing using Per Protocol order mode.       11-13 - enter or revise RT Bronchodilator order(s) to one equivalent RT bronchodilator order with QID Frequency and an Albuterol order with Frequency of every 4 hours PRN wheezing or increased work of breathing using Per Protocol order mode.      Greater than 13 - enter or revise RT Bronchodilator order(s) to one equivalent RT bronchodilator order with every 4 hours Frequency and an Albuterol order with Frequency of every 2 hours PRN wheezing or increased work of breathing using Per Protocol order mode.         Electronically signed by Randy Ba RCP on 12/13/2024 at 9:19 AM  
°F (37.2 °C) Axillary 75 16 96 % --   12/22/24 1900 133/78 99 °F (37.2 °C) -- 75 16 96 % --   12/22/24 1843 138/79 98.1 °F (36.7 °C) -- 78 17 96 % --   12/22/24 1520 (!) 143/74 97.9 °F (36.6 °C) Axillary 70 18 94 % --   12/22/24 1510 138/78 98.4 °F (36.9 °C) -- -- 20 -- 72.4 kg (159 lb 9.8 oz)   12/22/24 1140 138/70 98 °F (36.7 °C) -- -- 18 -- 73.4 kg (161 lb 13.1 oz)   12/22/24 1130 138/71 97.7 °F (36.5 °C) Axillary 70 19 94 % --   12/22/24 0904 (!) 130/54 98.5 °F (36.9 °C) Axillary 87 19 93 % --   12/22/24 0744 -- -- -- 81 18 91 % --       24HR INTAKE/OUTPUT:    Intake/Output Summary (Last 24 hours) at 12/23/2024 0726  Last data filed at 12/22/2024 2145  Gross per 24 hour   Intake 674 ml   Output --   Net 674 ml         CONSTITUTIONAL: awake, alert, cooperative, no apparent distress   EYES: pupils equal, round and reactive to light, sclera clear and conjunctiva normal  ENT: Normocephalic, without obvious abnormality, atraumatic  NECK: supple, symmetrical, no jugular venous distension and no carotid bruits   HEMATOLOGIC/LYMPHATIC: no cervical, supraclavicular or axillary lymphadenopathy   LUNGS: no increased work of breathing and clear to auscultation   CARDIOVASCULAR: regular rate and rhythm, normal S1 and S2, no murmur noted  ABDOMEN: normal bowel sounds x 4, soft, non-distended, non-tender, no masses palpated, no hepatosplenomgaly   MUSCULOSKELETAL: full range of motion noted, tone is normal  NEUROLOGIC: awake, alert, oriented to name, place and time. Motor skills grossly intact.   SKIN: Normal skin color, texture, turgor and no jaundice. appears intact   EXTREMITIES: no LE edema     DATA:  CBC:    Recent Labs     12/23/24  0600 12/23/24  0235 12/22/24  2304 12/22/24  1750 12/22/24  1130 12/22/24  1024 12/22/24  0600 12/22/24  0033   WBC  --  6.8  --  6.6  --  9.4  --  6.6   NEUTROABS  --  6.1  --  6.3  --  8.8*  --  6.1   LYMPHOPCT  --  3.2  --  1.2  --  1.6  --  3.0   RBC  --  2.55*  --  2.23*  --  2.38*  --  
  * 133* 132*   K 4.0 4.0 4.3   CL 95* 95* 95*   CO2 24 23 25   PHOS 4.4 4.8 5.2*   BUN 42* 64* 51*   CREATININE 2.6* 3.3* 2.7*     No results for input(s): \"CKTOTAL\", \"CKMB\", \"CKMBINDEX\", \"TROPONINI\" in the last 72 hours.    Coagulation:   Lab Results   Component Value Date/Time    INR 1.15 12/20/2024 04:44 AM     Cardiac markers: No results found for: \"CKMB\", \"CKTOTAL\", \"TROPONINI\", \"MYOGLOBIN\"      Lab Results   Component Value Date    ALT <5 (L) 12/11/2024    AST 13 (L) 12/11/2024    ALKPHOS 89 12/11/2024    BILITOT 0.5 12/11/2024       Lab Results   Component Value Date    INR 1.15 12/20/2024    INR 1.13 12/19/2024    INR 1.11 12/18/2024    PROTIME 14.9 12/20/2024    PROTIME 14.7 12/19/2024    PROTIME 14.5 12/18/2024      Latest Reference Range & Units 12/11/24 17:56   Serine Protease 3, IGG 0 - 19 AU/mL 229 (H)   (H): Data is abnormally high      Radiology      CT BIOPSY RENAL   Final Result   Successful CT guided core biopsy of the right kidney.         CT CHEST WO CONTRAST   Final Result   1. Bilateral pleural effusions with consolidation in the lower lobes.   2. Fibrotic lung changes.   3. Multiple nodules. Some are cavitary.  Differential includes metastases as   well as infection.  Follow-up is recommended         XR CHEST PORTABLE   Final Result   Extensive bilateral airspace disease, new from the prior examination.   Differential diagnosis includes pulmonary edema versus multifocal pneumonia.         IR NONTUNNELED VASCULAR CATHETER > 5 YEARS   Final Result   Successful placement of a temporary non tunneled HD catheter via the right   internal jugular vein.      Catheter is ready for immediate use.         CT ABDOMEN PELVIS WO CONTRAST Additional Contrast? None   Final Result   1. Mild ileus.   2. Solid nodule in the right middle lobe measuring 7 mm.  Recommend follow-up   chest CT in 6-12 months per Fleischner criteria.   3. Cholelithiasis.         CT HEAD WO CONTRAST   Final Result   No CT 
12/18/24  0450 12/17/24  1417 12/17/24  0433 12/16/24  0448 12/11/24  0903 12/11/24  0500   WBC 6.7 3.8* 5.2  --  8.1 6.4   < > 6.2   NEUTROABS  --   --  4.9  --  7.5 6.1  --  5.6   LYMPHOPCT  --   --  3.4  --  2.6 1.9  --  5.7   RBC 2.41* 2.51* 2.81*  --  2.47* 2.54*   < > 2.28*   HGB 7.0* 7.4* 8.2* 7.9* 7.1* 7.3*   < > 6.8*   HCT 21.1* 22.1* 24.7* 23.8* 21.4* 22.3*   < > 19.7*   MCV 87.8 87.8 88.1  --  86.5 88.1   < > 86.3   MCH 29.1 29.6 29.3  --  28.6 28.8   < > 29.7   MCHC 33.1 33.7 33.2  --  33.1 32.8   < > 34.4   RDW 16.9* 16.7* 16.4*  --  16.8* 16.9*   < > 16.4*   * 98* 101*  --  98* 85*   < > 179    < > = values in this interval not displayed.       PT/INR:    Recent Labs     12/20/24  0444 12/19/24  1044 12/18/24  0450 12/13/24  0410   INR 1.15 1.13 1.11 1.06     PTT:  No results for input(s): \"APTT\" in the last 720 hours.    CMP:    Recent Labs     12/20/24  0444 12/19/24  0439 12/18/24  0450 12/17/24  0433 12/15/24  0437 12/14/24  0458 12/11/24  1756 12/11/24  0501 12/11/24  0500 12/10/24  1904   * 132* 131* 132*  132*   < > 132*   < > 119*  --  122*   K 4.0 4.0 5.1 4.4  4.4   < > 4.8   < > 4.6  --  4.9   CL 95* 95* 94* 95*  95*   < > 96*   < > 88*  --  87*   CO2 23 24 23 26  26   < > 23   < > 22  --  23   GLUCOSE 84 105* 114* 91  91   < > 106*   < > 76  --  85   BUN 64* 42* 63* 43*  43*   < > 53*   < > 72*  --  68*   CREATININE 3.3* 2.6* 3.5* 2.7*  2.7*   < > 4.0*   < > 4.7*  --  4.7*   LABGLOM 15* 19* 14* 19*  19*   < > 12*   < > 10*  --  10*   CALCIUM 7.8* 7.7* 8.1* 7.8*  7.7*   < > 7.2*   < > 7.0*  --  8.1*   AGRATIO  --   --   --   --   --   --   --  0.6*  --  0.5*   BILITOT  --   --   --   --   --   --   --  0.5  --  0.5   ALKPHOS  --   --   --   --   --   --   --  89  --  82   ALT  --   --   --   --   --   --   --  <5*  --  <5*   AST  --   --   --   --   --   --   --  13*  --  13*   MG  --   --   --   --   --  1.77*  --   --  1.69* 1.46*    < > = values in this interval not 
17:56   Serine Protease 3, IGG 0 - 19 AU/mL 229 (H)   (H): Data is abnormally high      Radiology      CT CHEST WO CONTRAST   Final Result   1. Bilateral pleural effusions with consolidation in the lower lobes.   2. Fibrotic lung changes.   3. Multiple nodules. Some are cavitary.  Differential includes metastases as   well as infection.  Follow-up is recommended         XR CHEST PORTABLE   Final Result   Extensive bilateral airspace disease, new from the prior examination.   Differential diagnosis includes pulmonary edema versus multifocal pneumonia.         IR NONTUNNELED VASCULAR CATHETER > 5 YEARS   Final Result   Successful placement of a temporary non tunneled HD catheter via the right   internal jugular vein.      Catheter is ready for immediate use.         CT ABDOMEN PELVIS WO CONTRAST Additional Contrast? None   Final Result   1. Mild ileus.   2. Solid nodule in the right middle lobe measuring 7 mm.  Recommend follow-up   chest CT in 6-12 months per Fleischner criteria.   3. Cholelithiasis.         CT HEAD WO CONTRAST   Final Result   No CT findings to correlate with patient's symptoms.               Assessment & Plan:    69 y.o. female with a history of laryngeal cancer status post tracheostomy who presented to ED with complaint of dehydration.    Acute on chronic hypoxia  Uses 3 L at baseline. On 5 L. Weaning oxygen down. Pulmonary to do a bronchoscopy.  Heparin subq is on hold   She says she coughs all the time but denies other new symptoms  CXR ordered, reviewed. Discussed with pulmonology, thought most likely fluid  Wean O2 to assess O2 requirement  Hycodan  -underwent additional HD 12/14 afternoon  -CT scan chest ordered and results noted. Pulmonary seeing patient.     #Anemia- acute on chronic  Low hemoglobin and hematocrit, with hemoglobin less than 7  Stool occult negative  Received 3 unit PRBC on adm,. Will order another unit in preparation for renal biopsy.   Consider hemolysis vs renal failure 
21.7*   < > 19.7*   < > 19.5*   MCV 86.5 88.1 86.1 86.0   < > 86.3  --  84.5   MCH 28.6 28.8 28.7 29.2   < > 29.7  --  28.5   MCHC 33.1 32.8 33.4 34.0   < > 34.4  --  33.8   RDW 16.8* 16.9* 16.7* 16.7*   < > 16.4*  --  16.9*   PLT 98* 85* 100* 144   < > 179  --  226    < > = values in this interval not displayed.       PT/INR:    Recent Labs     12/13/24  0410   INR 1.06     PTT:  No results for input(s): \"APTT\" in the last 720 hours.    CMP:    Recent Labs     12/17/24  0433 12/16/24  0448 12/15/24  0437 12/14/24  0458 12/11/24  1756 12/11/24  0501 12/11/24  0500 12/10/24  1904   *  132* 132* 134* 132*   < > 119*  --  122*   K 4.4  4.4 4.6 4.0 4.8   < > 4.6  --  4.9   CL 95*  95* 96* 98* 96*   < > 88*  --  87*   CO2 26  26 23 24 23   < > 22  --  23   GLUCOSE 91  91 103* 105* 106*   < > 76  --  85   BUN 43*  43* 55* 38* 53*   < > 72*  --  68*   CREATININE 2.7*  2.7* 3.9* 3.2* 4.0*   < > 4.7*  --  4.7*   LABGLOM 19*  19* 12* 15* 12*   < > 10*  --  10*   CALCIUM 7.8*  7.7* 7.7* 7.3* 7.2*   < > 7.0*  --  8.1*   AGRATIO  --   --   --   --   --  0.6*  --  0.5*   BILITOT  --   --   --   --   --  0.5  --  0.5   ALKPHOS  --   --   --   --   --  89  --  82   ALT  --   --   --   --   --  <5*  --  <5*   AST  --   --   --   --   --  13*  --  13*   MG  --   --   --  1.77*  --   --  1.69* 1.46*    < > = values in this interval not displayed.       Lab Results   Component Value Date    CALCIUM 7.8 (L) 12/17/2024    CALCIUM 7.7 (L) 12/17/2024       LDH:  Recent Labs     12/11/24  0903          Radiology Review:  Echo (TTE) complete (PRN contrast/bubble/strain/3D)    Left Ventricle: Mildly reduced left ventricular systolic function with   a visually estimated EF of 45 - 50%. Left ventricle is mildly dilated.   Mildly increased wall thickness. Findings consistent with mild concentric   hypertrophy. Mild global hypokinesis present. Normal diastolic function.    Aortic Valve: Trileaflet valve. No regurgitation. No 
60 mg daily  Kidney biopsy planned for tomorrow.  Oncology following for anemia  Zosyn IV day #5  Follow-up cultures  Follow-up bronchoscopy results          
Mobility Raw Score : 8       Subjective  Patient lying reclined in bed with family present (children/grandchildren).  Pt agreeable to this PT session.     Cognition    A&O Person, Place, and Time   Able to follow 2 step commands    Pain   Yes  Location: all over  Rating: severe  Pain Medicine Status: Received pain med prior to tx, but not adequate    Activity Tolerance   During therapy session noted pt with no adverse symptoms    Pt Position BP (mmHg) HR (bpm) SpO2 (%) on 6L  Comments   Supine at rest   94    Seated at EOB   82    Standing       End of session   93      Objective  Does this pt have an acute or acute on chronic diagnosis of CHF? No    Balance  Static Sitting:  CGA-ModA intermittently  Dynamic Sitting:  Not Tested   Comments:     Static Standing: Not Tested  Dynamic Standing:  Not Tested  Comments:     Posture  Seated: Forward head and neck and Thoracic kyphosis  Standing: NA    Bed Mobility   Supine to Sit:    Max A  and 2 person  Sit to Supine:   Max A  and 2 person  Rolling:   Mod A   Scooting in sitting: Max A  and 2 person  Scooting in supine:  Max A  and 2 person   Bridging:  Not Tested  Comments:    Transfer Training     Sit to stand:   Not Tested  Stand to sit:   Not Tested  Bed to/from Chair:  Not Tested   Comments:     Gait pt is non-ambulatory at baseline; pt ambulated 0 ft.     Stair Training deferred, pt unsafe/ not appropriate to complete stairs at this time    Therapeutic Exercises Initiated  all completed bilaterally unless indicated  Supine:  Ankle Pumps x 10 reps    Positioning Needs   Pt in bed and no alarm needed  Call light provided and all needs within reach  RN aware of pt position/status    Other Activities  NA    Patient/Family Education   Pt educated on role of inpatient PT, POC, importance of continued activity, DC recommendations, functional transfer/mobility safety, transfer techniques, pursed lip breathing, and pacing activity.    Assessment  Pt seen today for physical 
Time Behavior, Nutrition Focused Physical Findings, Skin, Weight    Discharge Planning:    Too soon to determine     Cleo Alejo RD, LD  Contact: [phone]    
agreeable to this OT session. Reporting severe pain in R hip and requesting assist to reposition LLE.    Cognition:    A&O Person, Place, Time, and Situation   Able to follow 1 step commands    Pain:   Yes  Location: L hip  Rating: severe /10  Pain Medicine Status: RN notified, OT and PT assisted patient with multiple repositioning efforts and patient reported pain much improved at end of session. Still rating LLE pain as 8/10 at end of session.    Activity Tolerance:   Pt completed therapy session with fatigue  pain    Pt Position BP (mmHg) HR (bpm) SpO2 (%) on 2L  Comments   Supine at rest 122/88 69 100     Seated at EOB           Standing           End of session              Objective:  Does this pt have an acute or acute on chronic diagnosis of CHF? No    Upper Extremity ROM:    AROM WFL: No  UE ROM limitations: Assessment limited by patient position in sidelying during session. Reports decreased function in RUE. Able to use BUEs with encouragement during position changes in bed but unable to formally assess.    Upper Extremity Strength:    BUE strength impaired but not formally assessed w/ MMT    Bed Mobility:   Rolling:   Max A  and 2 person  Scooting in supine:  Total A with [place] sheet    Transfers:    Declined EOB and OOB. States she has finally gotten off of her bottom (has stage 2 sacral wound) in sidelying and does not want to put weight back through her bottom. Multiple attempts to reposition pillows to decrease LLE pain level in R sidelying were unsuccessful. Patient eventually agreeable to roll to L for a new position effort but declined EOB and OOB, states she will get up tomorrow after her brother brings her shoes.    ADLs:  Eating:   Independent beverage management    Toileting:  Not Tested-hansen in place    Ther Ex / Activities Initiated:   Ankle pumps x5    Positioning Needs:   Pt in bed and ICU monitoring  Call light provided and all needs within reach    Patient/Family Education:   Pt 
as tolerated  Bronch with no signs for diffuse alveolar hemorrhage  Inhaled bronchodilators  She had rituximab first dose 12/19 and prednisone 60 mg daily per nephrology, to continue as per nephro  Bactrim for prophylaxis  Kidney biopsy pathology pending, still pending final.vasculitis   Zosyn IV day # 10--> Cipro for 6 days  Follow-up cultures  Keep weaning O2 as still  Bronchodilator  DVT prophylaxis as per prior          
monitor I and O      # Petechial rash - prominent on palms and feet  - reports hx of similar rash in the past needing steroids  - obtain reports  - consider skin bx to rule out vasculitis      #Hyponatremia  Likely due to poor oral intake of fluid  Received  0.9% NS in ER   On continuous IVF 0.9% NS  Monitor Na levels closely, mentation stable    Nephrology managing    #Hypomagnesemia  Repleted and monitor      #Elevated troponin  Likely demand ischemia  Denies any chest pain   EKG with no acute changes  Monitor and trend    hx of laryngeal cancer stage 4   needing surgery with tracheostomy , PEG placement   Completed chemo radiation in 8/24, had trach decannulated and PEG removed last month  F/w [facility] oncology .  Oncology consulted     #Failure to thrive  -Patient has a known history of laryngeal cancer status post tracheostomy  Endorses poor oral intake-liquid and food.  Plan-swallow eval.  Nutritional consult.        DVT prophylaxis: SCD  Diet: REGULAR  Code Status: FULL         Juan Kelsey MD, 12/11/2024 6:35 AM            
La Nena CRUZ  Office: [phone]  Fax:    [phone]  [facility]        
Min A   4).  Gait: Ambulate  15 ft.   with Mod A  and use of LRAD (least restrictive assistive device)  5).  Tolerate B LE exercises 3 sets of 10-15 reps    Rehabilitation Potential: Fair  Strengths for achieving goals include:   Pt cooperative   Barriers to achieving goals include:    Complexity of condition, Chronicity of condition, Pain, and Weakness    Plan    To be seen 3-5 x/ week while in acute care setting for therapeutic exercises/activities, bed mobility training, functional transfer training, family/patient education, balance training, and gait training.    Signature: Aurelio Alford PT     If patient discharges from this facility prior to next visit, this note will serve as the Discharge Summary.    CHF Education  N/A   
control, iterative reconstruction, and/or weight based  adjustment of the mA/kV was utilized to reduce the radiation dose to as low  as reasonably achievable.    COMPARISON:  02/19/2008    HISTORY:  ORDERING SYSTEM PROVIDED HISTORY: large hernia abd pain nausea  TECHNOLOGIST PROVIDED HISTORY:  Reason for exam:->large hernia abd pain nausea  Additional Contrast?->None  Decision Support Exception - unselect if not a suspected or confirmed  emergency medical condition->Emergency Medical Condition (MA)  Reason for Exam: rge hernia abd pain nausea    FINDINGS:  Lower Chest: Trace pleural effusions.  Solid nodule of the right middle lobe  measuring 7 mm on series 2, image 12.    Organs: The liver is normal in contour and attenuation.  Cholelithiasis.  No  biliary dilatation.  Pancreas, adrenals, kidneys, spleen are unremarkable.  Moderate calcific atherosclerosis.    GI/Bowel: Mild ileus.    Pelvis: Unremarkable.    Peritoneum/Retroperitoneum: No free air, free fluid, organized fluid  collection, lymphadenopathy.    Bones/Soft Tissues: No acute bone or soft tissue abnormality.  Bowel  containing ventral hernia without evidence of obstruction or strangulation.  Impression: 1. Mild ileus.  2. Solid nodule in the right middle lobe measuring 7 mm.  Recommend follow-up  chest CT in 6-12 months per Fleischner criteria.  3. Cholelithiasis.  CT HEAD WO CONTRAST  Narrative: EXAMINATION:  CT OF THE HEAD WITHOUT CONTRAST  12/10/2024 6:49 pm    TECHNIQUE:  CT of the head was performed without the administration of intravenous  contrast. Automated exposure control, iterative reconstruction, and/or weight  based adjustment of the mA/kV was utilized to reduce the radiation dose to as  low as reasonably achievable.    COMPARISON:  Brain MRI 10/20/2020.    HISTORY:  ORDERING SYSTEM PROVIDED HISTORY: right arm weakness x 6 weeks  TECHNOLOGIST PROVIDED HISTORY:  Reason for exam:->right arm weakness x 6 weeks  Has a \"code stroke\" or \"stroke 
in between movement activities.  Pt functioning below baseline and will likely benefit from skilled occupational therapy services to maximize safety and independence.     Recommending SNF upon discharge as patient functioning below baseline, demonstrates good rehab potential and unable to return home due to burden of care beyond caregiver ability and home environment not conducive to patient recovery.    Goal(s) : 12/19/24: all goals ongoing  To be met in 3 Visits:  Bed to toilet/BSC:                                                                   Max A      To be met in 5 Visits:  Supine to/from Sit in preparation for ADL task:                      Mod A   Toileting                                                                                  Mod A   Grooming                                                                                Min A   Upper Body Dressing:                                                            Mod A   Lower Body Dressing:                                                            Mod A   Pt to demonstrate UE therapeutic exs x 15 reps with minimal cues     Rehabilitation Potential: Fair  Strengths for achieving goals include: Family Support   Barriers to achieving goals include:  Complexity of condition and Chronicity of condition, pain     Plan:  To be seen 3-5 x/ week while in acute care setting for therapeutic exercises/activities, bed mobility training, functional transfer training, family/patient education, ADL/IADL retraining, and energy conservation training.    Electronically signed by Tosha Piedra OT on 12/19/2024 at 1:20 PM      If patient discharges from this facility prior to next visit, this note will serve as the Discharge Summary    
rituximab 1 g IV x 1 on 12/19; next dose on January 2, 2025  -Continue dialysis Monday Wednesday Friday; next on 12/22 due to holiday schedule which is Sunday to Tuesday and Friday  -Retacrit 10,000 units with each dialysis  -Tunneled dialysis catheter when able        April Deutsch MD  Office: [phone]  Fax:    [phone]  [facility]        
symptoms  CXR ordered, reviewed. Discussed with pulmonology, thought most likely fluid  Wean O2 to assess O2 requirement  Hycodan  -underwent additional HD 12/14 afternoon  -CT scan chest ordered and results noted. Pulmonary seeing patient. Bronchoscopy done. Cultures noted. On IV zosyn day#7     #Anemia- acute on chronic-bleeding after a kidney biopsy.  Hemoglobin dropped down to 4.8.  Got a unit of blood this morning.  I personally spoke with nephrologist.  I also personally spoke to the interventional radiologist who did the procedure.  Plan is to do a stat kidney CT scan.  Monitor H&H closely.  Monitor blood pressure closely.  Transfuse as needed.  IR who did the biopsy aware of this.  12/22-I spoke to the IR physician yesterday after CTA was done.  There is no evidence of bleeding.  She has received a total of 4 units of blood yesterday.  H&H is stable.  Her vitals are stable.  She is on bedrest.  Got DDAVP yesterday.  Continue to monitor closely.  Low hemoglobin and hematocrit, with hemoglobin less than 7  Stool occult negative  Consider hemolysis vs renal failure as a cause  JUDY positive but normal hapto. LDH and bili  Has hx of stage 4 laryngeal cancer s.p chemo/radiation and chronic iron def   Folate def noted - started folic acid ,   Hematology following  Monitor with steroids   Daily PPI while on steroids     #FELISA  on HD. 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 Renal biopsy done Rituxin was given.  Elevated BUN and creatinine  Rash due to vasculitis. Received IV Solumedrol. Switched to PO Prednisone now.  Renal biopsy.  Given  IV fluid/albumin challenge with no change- UOP poor and worsening electrolytes and renal function  Nephrology on board  Avoid hypotension  Imaging with no obstruction, hansen to monitor I and O    # Petechial rash - prominent on palms and feet  - reports hx of similar rash in the past needing steroids  -started steroids now - high dose  Rash due to

## 2024-12-24 NOTE — DISCHARGE SUMMARY
today. Currently she is full code.      Diffuse body aches  -On norco. One time dose of Dilaudid given.       CBC:   Recent Labs     12/23/24  1817 12/24/24  0103 12/24/24  1031   WBC 6.7 7.3 8.4   HGB 6.9* 7.8* 7.8*   HCT 20.2* 23.1* 22.6*   MCV 88.3 89.6 89.6   * 113* 144     BMP:   Recent Labs     12/22/24  0600 12/24/24  0625   * 129*   K 4.7 4.3   CL 94* 94*   CO2 25 24   PHOS 6.1* 5.6*   BUN 64* 45*   CREATININE 3.3* 3.7*     LIVER PROFILE: No results for input(s): \"AST\", \"ALT\", \"LIPASE\", \"AMYLASE\", \"BILIDIR\", \"BILITOT\", \"ALKPHOS\" in the last 72 hours.    Invalid input(s): \"ALB\"  PT/INR:   Recent Labs     12/23/24  0901   PROTIME 15.1*   INR 1.17*     APTT: No results for input(s): \"APTT\" in the last 72 hours.  UA:No results for input(s): \"NITRITE\", \"COLORU\", \"PHUR\", \"LABCAST\", \"WBCUA\", \"RBCUA\", \"MUCUS\", \"TRICHOMONAS\", \"YEAST\", \"BACTERIA\", \"CLARITYU\", \"SPECGRAV\", \"LEUKOCYTESUR\", \"UROBILINOGEN\", \"BILIRUBINUR\", \"BLOODU\", \"GLUCOSEU\", \"AMORPHOUS\" in the last 72 hours.    Invalid input(s): \"KETONESU\"      IR TUNNELED CVC PLACE WO SQ PORT/PUMP > 5 YEARS   Final Result   Successful conversion of a temporary to a tunneled HD catheter.  Patient   currently has a 19 cm tip to cuff glide path HD catheter via a right internal   jugular vein approach.      RECOMMENDATIONS:   Tunneled catheter is ready for immediate use.         CTA ABDOMEN PELVIS W WO CONTRAST   Final Result   Large right subcapsular hematoma along the right kidney with no definitive   evidence of active arterial extravasation from a culprit vessel.  A few foci   of abnormal contrast noted along the interface of the hematoma and renal   parenchyma with no discrete bleeding arterial vessel.  The few subtle foci of   abnormal contrast enhancement may represent tiny capsular pseudoaneurysms.   Overall findings are most consistent with a resolving or nearly resolved   subcapsular hematoma related to recent kidney biopsy.      RECOMMENDATIONS:

## 2024-12-24 NOTE — PLAN OF CARE
Problem: Discharge Planning  Goal: Discharge to home or other facility with appropriate resources  12/23/2024 2131 by Amairani Melendez RN  Outcome: Progressing  12/23/2024 1046 by Viviana Jiang RN  Outcome: Progressing  Flowsheets (Taken 12/23/2024 1046)  Discharge to home or other facility with appropriate resources: Identify barriers to discharge with patient and caregiver     Problem: Pain  Goal: Verbalizes/displays adequate comfort level or baseline comfort level  12/23/2024 2131 by Amairani Melendez RN  Outcome: Progressing  12/23/2024 1046 by Viviana Jiang RN  Outcome: Progressing  Flowsheets (Taken 12/23/2024 1046)  Verbalizes/displays adequate comfort level or baseline comfort level:   Assess pain using appropriate pain scale   Administer analgesics based on type and severity of pain and evaluate response     Problem: Safety - Adult  Goal: Free from fall injury  Outcome: Progressing     Problem: ABCDS Injury Assessment  Goal: Absence of physical injury  Outcome: Progressing     Problem: Cardiovascular - Adult  Goal: Maintains optimal cardiac output and hemodynamic stability  Outcome: Progressing     Problem: Genitourinary - Adult  Goal: Urinary catheter remains patent  Outcome: Progressing     Problem: Hematologic - Adult  Goal: Maintains hematologic stability  Outcome: Progressing     Problem: Musculoskeletal - Adult  Goal: Return mobility to safest level of function  Outcome: Progressing     Problem: Nutrition Deficit:  Goal: Optimize nutritional status  Outcome: Progressing     Problem: Skin/Tissue Integrity  Goal: Absence of new skin breakdown  Description: 1.  Monitor for areas of redness and/or skin breakdown  2.  Assess vascular access sites hourly  3.  Every 4-6 hours minimum:  Change oxygen saturation probe site  4.  Every 4-6 hours:  If on nasal continuous positive airway pressure, respiratory therapy assess nares and determine need for appliance change or resting

## 2024-12-24 NOTE — CARE COORDINATION
DISCHARGE ORDER  Date/Time 2024 3:57 PM  Completed by: Teresa Boeck, RN, Case Management    Patient Name: Emily Alegre    : 1955      Admit order Date and Status: 12/10/24  Noted discharge order. (verify MD's last order for status of admission/Traditional Medicare 3 [place] Inpatient qualifying stay required for SNF)    Confirmed discharge plan with:              Patient:  Yes              When pt confirms DC plan does any support person need to be contacted by CM Yes if yes who- patient's son                      Discharge to Facility: [facility]   Facility phone number for staff giving report: [phone]   Pre-certification completed: yes   Hospital Exemption Notification (HENS) completed:    Discharge orders and Continuity of Care faxed to facility:  [facility]      Transportation:               Medical Transport explained with choice list offered to pt/family.                Choice:No(no preference)  Agency used: [facility]   time:   18:30      Pt/family/Nursing/Facility aware of  time:  Yes Names: Patient, Lupe RN, Wilma/[facility], [facility]/paperwork  Ambulance form completed:  yes:      Date Last IMM Given: 24    Comments:  Pt is being d/c'd to [facility] today. Pt's O2 sats are 97% on 4L.    Discharge timeout done with Lupe WOOSD. All discharge needs and concerns addressed.    Discharging nurse to complete KRISTIN, reconcile AVS, and place final copy with patient's discharge packet. Discharging RN to ensure that written prescriptions for  Level II medications are sent with patient to the facility as per protocol.

## 2024-12-24 NOTE — PLAN OF CARE
Problem: Discharge Planning  Goal: Discharge to home or other facility with appropriate resources  12/24/2024 1119 by Viviana Jiang, RN  Outcome: Progressing     Problem: Pain  Goal: Verbalizes/displays adequate comfort level or baseline comfort level  12/24/2024 1119 by Viviana Jiang, RN  Outcome: Progressing  Flowsheets (Taken 12/24/2024 1119)  Verbalizes/displays adequate comfort level or baseline comfort level:   Encourage patient to monitor pain and request assistance   Assess pain using appropriate pain scale   Administer analgesics based on type and severity of pain and evaluate response     Problem: Cardiovascular - Adult  Goal: Maintains optimal cardiac output and hemodynamic stability  12/24/2024 1119 by Viviana Jiang, RN  Outcome: Progressing

## 2024-12-24 NOTE — FLOWSHEET NOTE
12/23/24 2321   Vital Signs   Temp 97.7 °F (36.5 °C)   Temp Source Oral   Pulse 74   Respirations 16   /77   MAP (Calculated) 97   Oxygen Therapy   SpO2 94 %     Blood stopped at this time,  No signs or symptoms of complications, Patient resting in bed

## 2024-12-24 NOTE — FLOWSHEET NOTE
12/23/24 1937   Vital Signs   Temp 97.7 °F (36.5 °C)   Temp Source Oral   Pulse 72   Heart Rate Source Monitor   Respirations 16   BP (!) 144/81   MAP (Calculated) 102   BP Location Left upper arm   BP Method Automatic   Patient Position Semi fowlers   Oxygen Therapy   SpO2 98 %   O2 Device High flow nasal cannula   O2 Flow Rate (L/min) 4 L/min     PM Assessment completed. Scheduled medications given per MAR, On 4L of oxygen, A&O X4 but confused at times, Vital Signs completed and Charted, Patient denies any further needs at this time. Call light within reach, Reminded patient to call RN if she needs anything.

## 2024-12-24 NOTE — FLOWSHEET NOTE
12/24/24 1213 12/24/24 1516   Vital Signs   BP (!) 144/80 (!) 148/92   Temp 97.6 °F (36.4 °C) 97.8 °F (36.6 °C)   Pulse 74 78   Respirations 20 20       Treatment time:  3 hours  Net UF: No UF    Pre weight: 74.1 kg (bed scale)  Post weight: 74.1 kg (bed scale)  EDW: TBD    Access used: RIJ HD Tunneled cath  Access function: No problems    Medications or blood products given: None    Regular outpatient schedule: [facility] upon discharge    Summary of response to treatment: Tolerated 3 hour HD tx without difficulty.  No UF. Vital signs stable throughout tx.      Crit line: H1=24.7, H2= 24.6, difference of .1, no refill present.  Ending profile A.     Copy of dialysis treatment record placed in chart, to be scanned into EMR.    Attempted to call report to Viviana Mendoza RN, was busy with new admit.  Will attempt calling at a later time.

## 2024-12-26 LAB
FINAL REPORT: NORMAL
FINAL REPORT: NORMAL
PRELIMINARY: NORMAL
PRELIMINARY: NORMAL

## 2024-12-27 LAB
FINAL REPORT: NORMAL
PRELIMINARY: NORMAL

## 2024-12-30 LAB
FINAL REPORT: NORMAL
PRELIMINARY: NORMAL

## 2024-12-31 ENCOUNTER — TELEPHONE (OUTPATIENT)
Dept: PULMONOLOGY | Age: 69
End: 2024-12-31

## 2024-12-31 LAB
ACID FAST STN SPEC QL: NORMAL
ACID FAST STN SPEC QL: NORMAL
MYCOBACTERIUM SPEC CULT: NORMAL
MYCOBACTERIUM SPEC CULT: NORMAL

## 2024-12-31 NOTE — TELEPHONE ENCOUNTER
Patient was need by our providers while INPT and had bronch with Dr. Durand. [facility] called to schedule patient for a hospital follow up. Please advise.     Call back number is Moses [phone]

## 2025-01-06 ENCOUNTER — APPOINTMENT (OUTPATIENT)
Dept: CT IMAGING | Age: 70
DRG: 314 | End: 2025-01-06
Payer: MEDICARE

## 2025-01-06 ENCOUNTER — HOSPITAL ENCOUNTER (INPATIENT)
Age: 70
LOS: 10 days | Discharge: NURSING, REHAB OR LONG-TERM CARE | DRG: 314 | End: 2025-01-16
Attending: STUDENT IN AN ORGANIZED HEALTH CARE EDUCATION/TRAINING PROGRAM | Admitting: INTERNAL MEDICINE
Payer: MEDICARE

## 2025-01-06 DIAGNOSIS — J18.9 PNEUMONIA OF BOTH LUNGS DUE TO INFECTIOUS ORGANISM, UNSPECIFIED PART OF LUNG: ICD-10-CM

## 2025-01-06 DIAGNOSIS — N18.6 ESRD ON DIALYSIS (HCC): ICD-10-CM

## 2025-01-06 DIAGNOSIS — Z99.2 ESRD ON DIALYSIS (HCC): ICD-10-CM

## 2025-01-06 DIAGNOSIS — K92.0 HEMATEMESIS WITH NAUSEA: Primary | ICD-10-CM

## 2025-01-06 DIAGNOSIS — Z98.890 STATUS POST THORACENTESIS: ICD-10-CM

## 2025-01-06 DIAGNOSIS — Z85.21 H/O LARYNGEAL CANCER: ICD-10-CM

## 2025-01-06 LAB
ABO + RH BLD: NORMAL
ALBUMIN SERPL-MCNC: 2.8 G/DL (ref 3.4–5)
ALBUMIN/GLOB SERPL: 1 {RATIO} (ref 1.1–2.2)
ALP SERPL-CCNC: 84 U/L (ref 40–129)
ALT SERPL-CCNC: <5 U/L (ref 10–40)
ANION GAP SERPL CALCULATED.3IONS-SCNC: 8 MMOL/L (ref 3–16)
APTT BLD: 28.6 SEC (ref 22.1–36.4)
AST SERPL-CCNC: 16 U/L (ref 15–37)
BASOPHILS # BLD: 0 K/UL (ref 0–0.2)
BASOPHILS NFR BLD: 0.4 %
BILIRUB SERPL-MCNC: 0.8 MG/DL (ref 0–1)
BLD GP AB SCN SERPL QL: NORMAL
BLOOD BANK DISPENSE STATUS: NORMAL
BLOOD BANK PRODUCT CODE: NORMAL
BPU ID: NORMAL
BUN SERPL-MCNC: 44 MG/DL (ref 7–20)
CALCIUM SERPL-MCNC: 8.1 MG/DL (ref 8.3–10.6)
CHLORIDE SERPL-SCNC: 94 MMOL/L (ref 99–110)
CO2 SERPL-SCNC: 33 MMOL/L (ref 21–32)
CREAT SERPL-MCNC: 2.8 MG/DL (ref 0.6–1.2)
DEPRECATED RDW RBC AUTO: 17.6 % (ref 12.4–15.4)
DESCRIPTION BLOOD BANK: NORMAL
EOSINOPHIL # BLD: 0 K/UL (ref 0–0.6)
EOSINOPHIL NFR BLD: 0.1 %
FLUAV RNA UPPER RESP QL NAA+PROBE: NEGATIVE
FLUBV AG NPH QL: NEGATIVE
GFR SERPLBLD CREATININE-BSD FMLA CKD-EPI: 18 ML/MIN/{1.73_M2}
GLUCOSE SERPL-MCNC: 81 MG/DL (ref 70–99)
HCT VFR BLD AUTO: 19.8 % (ref 36–48)
HCT VFR BLD AUTO: 20.9 % (ref 36–48)
HCT VFR BLD AUTO: 21.9 % (ref 36–48)
HEMOCCULT STL QL: NORMAL
HGB BLD-MCNC: 6.7 G/DL (ref 12–16)
HGB BLD-MCNC: 7.1 G/DL (ref 12–16)
HGB BLD-MCNC: 7.4 G/DL (ref 12–16)
INR PPP: 1.11 (ref 0.85–1.15)
LACTATE BLDV-SCNC: 1 MMOL/L (ref 0.4–1.9)
LIPASE SERPL-CCNC: 15 U/L (ref 13–60)
LYMPHOCYTES # BLD: 0 K/UL (ref 1–5.1)
LYMPHOCYTES NFR BLD: 1.3 %
MCH RBC QN AUTO: 30.5 PG (ref 26–34)
MCHC RBC AUTO-ENTMCNC: 33.7 G/DL (ref 31–36)
MCV RBC AUTO: 90.5 FL (ref 80–100)
MONOCYTES # BLD: 0.1 K/UL (ref 0–1.3)
MONOCYTES NFR BLD: 2.5 %
NEUTROPHILS # BLD: 3.8 K/UL (ref 1.7–7.7)
NEUTROPHILS NFR BLD: 95.7 %
PLATELET # BLD AUTO: 143 K/UL (ref 135–450)
PMV BLD AUTO: 7.3 FL (ref 5–10.5)
POTASSIUM SERPL-SCNC: 3.6 MMOL/L (ref 3.5–5.1)
PROT SERPL-MCNC: 5.6 G/DL (ref 6.4–8.2)
PROTHROMBIN TIME: 14.5 SEC (ref 11.9–14.9)
RBC # BLD AUTO: 2.42 M/UL (ref 4–5.2)
RSV AG NOSE QL: NEGATIVE
SARS-COV-2 RDRP RESP QL NAA+PROBE: NOT DETECTED
SODIUM SERPL-SCNC: 135 MMOL/L (ref 136–145)
WBC # BLD AUTO: 3.9 K/UL (ref 4–11)

## 2025-01-06 PROCEDURE — 83605 ASSAY OF LACTIC ACID: CPT

## 2025-01-06 PROCEDURE — 96375 TX/PRO/DX INJ NEW DRUG ADDON: CPT

## 2025-01-06 PROCEDURE — 6370000000 HC RX 637 (ALT 250 FOR IP)

## 2025-01-06 PROCEDURE — 87635 SARS-COV-2 COVID-19 AMP PRB: CPT

## 2025-01-06 PROCEDURE — 6370000000 HC RX 637 (ALT 250 FOR IP): Performed by: INTERNAL MEDICINE

## 2025-01-06 PROCEDURE — 2580000003 HC RX 258

## 2025-01-06 PROCEDURE — 6360000002 HC RX W HCPCS

## 2025-01-06 PROCEDURE — 6360000004 HC RX CONTRAST MEDICATION: Performed by: STUDENT IN AN ORGANIZED HEALTH CARE EDUCATION/TRAINING PROGRAM

## 2025-01-06 PROCEDURE — 6360000002 HC RX W HCPCS: Performed by: STUDENT IN AN ORGANIZED HEALTH CARE EDUCATION/TRAINING PROGRAM

## 2025-01-06 PROCEDURE — 36430 TRANSFUSION BLD/BLD COMPNT: CPT

## 2025-01-06 PROCEDURE — 99223 1ST HOSP IP/OBS HIGH 75: CPT

## 2025-01-06 PROCEDURE — 87804 INFLUENZA ASSAY W/OPTIC: CPT

## 2025-01-06 PROCEDURE — P9016 RBC LEUKOCYTES REDUCED: HCPCS

## 2025-01-06 PROCEDURE — 94640 AIRWAY INHALATION TREATMENT: CPT

## 2025-01-06 PROCEDURE — 74174 CTA ABD&PLVS W/CONTRAST: CPT

## 2025-01-06 PROCEDURE — 85610 PROTHROMBIN TIME: CPT

## 2025-01-06 PROCEDURE — 86900 BLOOD TYPING SEROLOGIC ABO: CPT

## 2025-01-06 PROCEDURE — 80053 COMPREHEN METABOLIC PANEL: CPT

## 2025-01-06 PROCEDURE — 87150 DNA/RNA AMPLIFIED PROBE: CPT

## 2025-01-06 PROCEDURE — 87040 BLOOD CULTURE FOR BACTERIA: CPT

## 2025-01-06 PROCEDURE — 85025 COMPLETE CBC W/AUTO DIFF WBC: CPT

## 2025-01-06 PROCEDURE — 1200000000 HC SEMI PRIVATE

## 2025-01-06 PROCEDURE — 2700000000 HC OXYGEN THERAPY PER DAY

## 2025-01-06 PROCEDURE — 85018 HEMOGLOBIN: CPT

## 2025-01-06 PROCEDURE — 99285 EMERGENCY DEPT VISIT HI MDM: CPT

## 2025-01-06 PROCEDURE — 82270 OCCULT BLOOD FECES: CPT

## 2025-01-06 PROCEDURE — 85014 HEMATOCRIT: CPT

## 2025-01-06 PROCEDURE — 87186 SC STD MICRODIL/AGAR DIL: CPT

## 2025-01-06 PROCEDURE — 86901 BLOOD TYPING SEROLOGIC RH(D): CPT

## 2025-01-06 PROCEDURE — 85730 THROMBOPLASTIN TIME PARTIAL: CPT

## 2025-01-06 PROCEDURE — 36415 COLL VENOUS BLD VENIPUNCTURE: CPT

## 2025-01-06 PROCEDURE — 83690 ASSAY OF LIPASE: CPT

## 2025-01-06 PROCEDURE — 96365 THER/PROPH/DIAG IV INF INIT: CPT

## 2025-01-06 PROCEDURE — 87807 RSV ASSAY W/OPTIC: CPT

## 2025-01-06 PROCEDURE — 30233N1 TRANSFUSION OF NONAUTOLOGOUS RED BLOOD CELLS INTO PERIPHERAL VEIN, PERCUTANEOUS APPROACH: ICD-10-PCS | Performed by: INTERNAL MEDICINE

## 2025-01-06 PROCEDURE — 2580000003 HC RX 258: Performed by: STUDENT IN AN ORGANIZED HEALTH CARE EDUCATION/TRAINING PROGRAM

## 2025-01-06 PROCEDURE — 71250 CT THORAX DX C-: CPT

## 2025-01-06 PROCEDURE — 87641 MR-STAPH DNA AMP PROBE: CPT

## 2025-01-06 PROCEDURE — 86923 COMPATIBILITY TEST ELECTRIC: CPT

## 2025-01-06 PROCEDURE — 86850 RBC ANTIBODY SCREEN: CPT

## 2025-01-06 RX ORDER — VANCOMYCIN 1.5 G/300ML
1500 INJECTION, SOLUTION INTRAVENOUS ONCE
Status: COMPLETED | OUTPATIENT
Start: 2025-01-06 | End: 2025-01-06

## 2025-01-06 RX ORDER — HYDROCODONE BITARTRATE AND ACETAMINOPHEN 5; 325 MG/1; MG/1
1 TABLET ORAL EVERY 12 HOURS PRN
Status: DISCONTINUED | OUTPATIENT
Start: 2025-01-06 | End: 2025-01-13

## 2025-01-06 RX ORDER — IOPAMIDOL 755 MG/ML
100 INJECTION, SOLUTION INTRAVASCULAR
Status: COMPLETED | OUTPATIENT
Start: 2025-01-06 | End: 2025-01-06

## 2025-01-06 RX ORDER — SODIUM CHLORIDE 0.9 % (FLUSH) 0.9 %
5-40 SYRINGE (ML) INJECTION PRN
Status: DISCONTINUED | OUTPATIENT
Start: 2025-01-06 | End: 2025-01-16 | Stop reason: HOSPADM

## 2025-01-06 RX ORDER — BENZONATATE 100 MG/1
100 CAPSULE ORAL 3 TIMES DAILY PRN
Status: DISCONTINUED | OUTPATIENT
Start: 2025-01-06 | End: 2025-01-16 | Stop reason: HOSPADM

## 2025-01-06 RX ORDER — MIDODRINE HYDROCHLORIDE 10 MG/1
10 TABLET ORAL
Status: DISCONTINUED | OUTPATIENT
Start: 2025-01-06 | End: 2025-01-06

## 2025-01-06 RX ORDER — SULFAMETHOXAZOLE AND TRIMETHOPRIM 800; 160 MG/1; MG/1
0.5 TABLET ORAL
Status: DISCONTINUED | OUTPATIENT
Start: 2025-01-06 | End: 2025-01-16 | Stop reason: HOSPADM

## 2025-01-06 RX ORDER — SODIUM CHLORIDE 9 MG/ML
INJECTION, SOLUTION INTRAVENOUS PRN
Status: DISCONTINUED | OUTPATIENT
Start: 2025-01-06 | End: 2025-01-16 | Stop reason: HOSPADM

## 2025-01-06 RX ORDER — ONDANSETRON 2 MG/ML
4 INJECTION INTRAMUSCULAR; INTRAVENOUS EVERY 6 HOURS PRN
Status: DISCONTINUED | OUTPATIENT
Start: 2025-01-06 | End: 2025-01-16 | Stop reason: HOSPADM

## 2025-01-06 RX ORDER — IPRATROPIUM BROMIDE AND ALBUTEROL SULFATE 2.5; .5 MG/3ML; MG/3ML
1 SOLUTION RESPIRATORY (INHALATION)
Status: DISCONTINUED | OUTPATIENT
Start: 2025-01-07 | End: 2025-01-06

## 2025-01-06 RX ORDER — BISACODYL 10 MG
10 SUPPOSITORY, RECTAL RECTAL PRN
COMMUNITY

## 2025-01-06 RX ORDER — BUSPIRONE HYDROCHLORIDE 10 MG/1
10 TABLET ORAL 2 TIMES DAILY
Status: DISCONTINUED | OUTPATIENT
Start: 2025-01-06 | End: 2025-01-06

## 2025-01-06 RX ORDER — MIDODRINE HYDROCHLORIDE 10 MG/1
10 TABLET ORAL EVERY 8 HOURS PRN
Status: DISCONTINUED | OUTPATIENT
Start: 2025-01-06 | End: 2025-01-16 | Stop reason: HOSPADM

## 2025-01-06 RX ORDER — TRAZODONE HYDROCHLORIDE 50 MG/1
25 TABLET, FILM COATED ORAL NIGHTLY
Status: ON HOLD | COMMUNITY
End: 2025-01-16

## 2025-01-06 RX ORDER — ONDANSETRON 4 MG/1
4 TABLET, ORALLY DISINTEGRATING ORAL EVERY 8 HOURS PRN
Status: DISCONTINUED | OUTPATIENT
Start: 2025-01-06 | End: 2025-01-16 | Stop reason: HOSPADM

## 2025-01-06 RX ORDER — ALBUTEROL SULFATE 90 UG/1
2 INHALANT RESPIRATORY (INHALATION) EVERY 4 HOURS PRN
Status: DISCONTINUED | OUTPATIENT
Start: 2025-01-06 | End: 2025-01-16 | Stop reason: HOSPADM

## 2025-01-06 RX ORDER — TRAZODONE HYDROCHLORIDE 50 MG/1
25 TABLET, FILM COATED ORAL NIGHTLY
Status: DISCONTINUED | OUTPATIENT
Start: 2025-01-06 | End: 2025-01-13

## 2025-01-06 RX ORDER — IPRATROPIUM BROMIDE AND ALBUTEROL SULFATE 2.5; .5 MG/3ML; MG/3ML
1 SOLUTION RESPIRATORY (INHALATION)
Status: DISCONTINUED | OUTPATIENT
Start: 2025-01-06 | End: 2025-01-16 | Stop reason: HOSPADM

## 2025-01-06 RX ORDER — POLYETHYLENE GLYCOL 3350 17 G/17G
17 POWDER, FOR SOLUTION ORAL DAILY PRN
COMMUNITY

## 2025-01-06 RX ORDER — SODIUM CHLORIDE 0.9 % (FLUSH) 0.9 %
5-40 SYRINGE (ML) INJECTION EVERY 12 HOURS SCHEDULED
Status: DISCONTINUED | OUTPATIENT
Start: 2025-01-06 | End: 2025-01-16 | Stop reason: HOSPADM

## 2025-01-06 RX ORDER — PANTOPRAZOLE SODIUM 40 MG/10ML
40 INJECTION, POWDER, LYOPHILIZED, FOR SOLUTION INTRAVENOUS ONCE
Status: COMPLETED | OUTPATIENT
Start: 2025-01-06 | End: 2025-01-06

## 2025-01-06 RX ORDER — HYDROCODONE BITARTRATE AND ACETAMINOPHEN 5; 325 MG/1; MG/1
1 TABLET ORAL 3 TIMES DAILY
Status: DISCONTINUED | OUTPATIENT
Start: 2025-01-06 | End: 2025-01-13

## 2025-01-06 RX ORDER — HYDROCODONE BITARTRATE AND ACETAMINOPHEN 5; 325 MG/1; MG/1
1 TABLET ORAL EVERY 12 HOURS PRN
Status: ON HOLD | COMMUNITY
End: 2025-01-16 | Stop reason: HOSPADM

## 2025-01-06 RX ORDER — SODIUM CHLORIDE 9 MG/ML
INJECTION, SOLUTION INTRAVENOUS PRN
Status: DISCONTINUED | OUTPATIENT
Start: 2025-01-06 | End: 2025-01-13

## 2025-01-06 RX ORDER — IPRATROPIUM BROMIDE AND ALBUTEROL SULFATE 2.5; .5 MG/3ML; MG/3ML
1 SOLUTION RESPIRATORY (INHALATION)
Status: DISCONTINUED | OUTPATIENT
Start: 2025-01-06 | End: 2025-01-06

## 2025-01-06 RX ORDER — PREDNISONE 20 MG/1
60 TABLET ORAL DAILY
Status: DISCONTINUED | OUTPATIENT
Start: 2025-01-06 | End: 2025-01-07

## 2025-01-06 RX ORDER — FOLIC ACID 1 MG/1
1 TABLET ORAL DAILY
Status: DISCONTINUED | OUTPATIENT
Start: 2025-01-06 | End: 2025-01-16 | Stop reason: HOSPADM

## 2025-01-06 RX ORDER — HYDROCODONE BITARTRATE AND ACETAMINOPHEN 5; 325 MG/1; MG/1
1 TABLET ORAL 3 TIMES DAILY
Status: ON HOLD | COMMUNITY
End: 2025-01-16

## 2025-01-06 RX ORDER — BUSPIRONE HYDROCHLORIDE 10 MG/1
10 TABLET ORAL 3 TIMES DAILY
Status: DISCONTINUED | OUTPATIENT
Start: 2025-01-06 | End: 2025-01-13

## 2025-01-06 RX ADMIN — BUSPIRONE HYDROCHLORIDE 10 MG: 10 TABLET ORAL at 20:13

## 2025-01-06 RX ADMIN — IOPAMIDOL 100 ML: 755 INJECTION, SOLUTION INTRAVENOUS at 11:05

## 2025-01-06 RX ADMIN — PANTOPRAZOLE SODIUM 40 MG: 40 INJECTION, POWDER, FOR SOLUTION INTRAVENOUS at 11:49

## 2025-01-06 RX ADMIN — VANCOMYCIN 1500 MG: 1.5 INJECTION, SOLUTION INTRAVENOUS at 13:46

## 2025-01-06 RX ADMIN — CEFEPIME 2000 MG: 2 INJECTION, POWDER, FOR SOLUTION INTRAVENOUS at 12:25

## 2025-01-06 RX ADMIN — HYDROCODONE BITARTRATE AND ACETAMINOPHEN 1 TABLET: 5; 325 TABLET ORAL at 20:12

## 2025-01-06 RX ADMIN — SODIUM CHLORIDE 40 MG: 9 INJECTION INTRAMUSCULAR; INTRAVENOUS; SUBCUTANEOUS at 23:21

## 2025-01-06 RX ADMIN — IPRATROPIUM BROMIDE AND ALBUTEROL SULFATE 1 DOSE: 2.5; .5 SOLUTION RESPIRATORY (INHALATION) at 17:51

## 2025-01-06 ASSESSMENT — PAIN DESCRIPTION - ONSET
ONSET: PROGRESSIVE
ONSET: ON-GOING

## 2025-01-06 ASSESSMENT — PAIN DESCRIPTION - DESCRIPTORS
DESCRIPTORS: ACHING
DESCRIPTORS: ACHING

## 2025-01-06 ASSESSMENT — PAIN DESCRIPTION - ORIENTATION
ORIENTATION: RIGHT;LEFT
ORIENTATION: MID

## 2025-01-06 ASSESSMENT — PAIN DESCRIPTION - PAIN TYPE
TYPE: CHRONIC PAIN
TYPE: CHRONIC PAIN

## 2025-01-06 ASSESSMENT — PAIN SCALES - GENERAL
PAINLEVEL_OUTOF10: 0
PAINLEVEL_OUTOF10: 10
PAINLEVEL_OUTOF10: 10

## 2025-01-06 ASSESSMENT — PAIN - FUNCTIONAL ASSESSMENT
PAIN_FUNCTIONAL_ASSESSMENT: PREVENTS OR INTERFERES WITH MANY ACTIVE NOT PASSIVE ACTIVITIES
PAIN_FUNCTIONAL_ASSESSMENT: NONE - DENIES PAIN
PAIN_FUNCTIONAL_ASSESSMENT: PREVENTS OR INTERFERES SOME ACTIVE ACTIVITIES AND ADLS

## 2025-01-06 ASSESSMENT — PAIN DESCRIPTION - LOCATION
LOCATION: GENERALIZED
LOCATION: GENERALIZED;NECK

## 2025-01-06 ASSESSMENT — PAIN DESCRIPTION - DIRECTION: RADIATING_TOWARDS: DENIES

## 2025-01-06 ASSESSMENT — PAIN DESCRIPTION - FREQUENCY
FREQUENCY: CONTINUOUS
FREQUENCY: CONTINUOUS

## 2025-01-06 NOTE — CARE COORDINATION
Case Management Assessment  Initial Evaluation    Date/Time of Evaluation: 1/6/2025 4:05 PM  Assessment Completed by: Dasha Davis RN    If patient is discharged prior to next notation, then this note serves as note for discharge by case management.    Patient Name: Emily Alegre                   YOB: 1955  Diagnosis: Hematemesis [K92.0]  ESRD on dialysis (HCC) [N18.6, Z99.2]  Hematemesis with nausea [K92.0]  Pneumonia of both lungs due to infectious organism, unspecified part of lung [J18.9]                   Date / Time: 1/6/2025  8:52 AM    Patient Admission Status: Inpatient   Readmission Risk (Low < 19, Mod (19-27), High > 27): Readmission Risk Score: 23.9    Current PCP: Shoshana Mitchell MD  PCP verified by CM? Yes    Chart Reviewed: Yes      History Provided by: Patient  Patient Orientation: Alert and Oriented    Patient Cognition: Alert    Hospitalization in the last 30 days (Readmission):  Yes    If yes, Readmission Assessment in  Navigator will be completed.    Advance Directives:      Code Status: Full Code   Patient's Primary Decision Maker is: Named in Scanned ACP Document    Primary Decision Maker: Damian Alegre - Child - [phone]    Secondary Decision Maker: Suzanna Alas - Niece/Nephew - [phone]    Discharge Planning:    Patient lives with: Alone Type of Home: Skilled Nursing Facility  Primary Care Giver: Self  Patient Support Systems include: Children   Current Financial resources: Medicare  Current community resources: ECF/Home Care ([facility])  Current services prior to admission: Durable Medical Equipment, Oxygen Therapy            Current DME: Walker, Cane, Oxygen Therapy (Comment) ([place])            Type of Home Care services:  None    ADLS  Prior functional level: Assistance with the following:, Bathing, Dressing, Toileting, Cooking, Housework, Shopping, Mobility  Current functional level: Assistance with the following:, Bathing, Dressing, Toileting, Cooking,

## 2025-01-06 NOTE — ACP (ADVANCE CARE PLANNING)
Advance Care Planning     General Advance Care Planning (ACP) Conversation    Date of Conversation: 1/6/2025  Conducted with: Patient with Decision Making Capacity and Healthcare Decision Maker  Other persons present: None    Healthcare Decision Maker:   Primary Decision Maker: Damian Alegre - Child - [phone]    Secondary Decision Maker: Suzanna Alas - Niece/Nephew - [phone]       Content/Action Overview:  Has ACP document(s) on file - reflects the patient's care preferences  Reviewed DNR/DNI and patient elects Full Code (Attempt Resuscitation)        Length of Voluntary ACP Conversation in minutes:  <16 minutes (Non-Billable)    Dasha Davis RN

## 2025-01-06 NOTE — ED PROVIDER NOTES
Conditions that may contribute to presentation today:  has a past medical history of Asthma, Chronic bronchiolitis (HCC), and Throat cancer (HCC).     Social Determinants affecting Dx or Tx:    Social History     Socioeconomic History   • Marital status:      Spouse name: Not on file   • Number of children: Not on file   • Years of education: Not on file   • Highest education level: Not on file   Occupational History   • Not on file   Tobacco Use   • Smoking status: Former     Current packs/day: 1.00     Types: Cigarettes   • Smokeless tobacco: Never   Vaping Use   • Vaping status: Never Used   Substance and Sexual Activity   • Alcohol use: No   • Drug use: No   • Sexual activity: Not on file   Other Topics Concern   • Not on file   Social History Narrative   • Not on file     Social Determinants of Health     Financial Resource Strain: Not on file   Food Insecurity: No Food Insecurity (12/11/2024)    Hunger Vital Sign    • Worried About Running Out of Food in the Last Year: Never true    • Ran Out of Food in the Last Year: Never true   Transportation Needs: No Transportation Needs (12/11/2024)    PRAPARE - Transportation    • Lack of Transportation (Medical): No    • Lack of Transportation (Non-Medical): No   Physical Activity: Not on file   Stress: Not on file   Social Connections: Not on file   Intimate Partner Violence: Patient Declined (8/8/2024)    [facility] from [facility]    Humiliation, Afraid, Rape, and Kick questionnaire    • Fear of Current or Ex-Partner: Patient declined    • Emotionally Abused: Patient declined    • Physically Abused: Patient declined    • Sexually Abused: Patient declined   Housing Stability: High Risk (12/11/2024)    Housing Stability Vital Sign    • Unable to Pay for Housing in the Last Year: No    • Number of Times Moved in the Last Year: 4    • Homeless in the Last Year: No         Differential diagnosis includes but is not limited to: gastroenteritis, peptic ulcer

## 2025-01-06 NOTE — ED NOTES
1226 Placed call to [facility] for stat consult. Expecting a call from Dr. Naranjo   1231 Dr. Naranjo returned call back.

## 2025-01-06 NOTE — PLAN OF CARE
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 High risk patient. 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 Prior PEG with removal in November.     Recommendations  NPO midnight  EGD tomorrow  Continue PPI BID  Monitor respiratory status

## 2025-01-07 ENCOUNTER — APPOINTMENT (OUTPATIENT)
Dept: GENERAL RADIOLOGY | Age: 70
DRG: 314 | End: 2025-01-07
Payer: MEDICARE

## 2025-01-07 ENCOUNTER — APPOINTMENT (OUTPATIENT)
Dept: ULTRASOUND IMAGING | Age: 70
DRG: 314 | End: 2025-01-07
Payer: MEDICARE

## 2025-01-07 PROBLEM — R04.2 HEMOPTYSIS: Status: ACTIVE | Noted: 2025-01-07

## 2025-01-07 PROBLEM — N18.6 ESRD ON DIALYSIS (HCC): Status: ACTIVE | Noted: 2025-01-07

## 2025-01-07 LAB
ACID FAST STN SPEC QL: NORMAL
ACID FAST STN SPEC QL: NORMAL
ANION GAP SERPL CALCULATED.3IONS-SCNC: 9 MMOL/L (ref 3–16)
APPEARANCE FLUID: NORMAL
BASOPHILS # BLD: 0 K/UL (ref 0–0.2)
BASOPHILS NFR BLD: 0.4 %
BDY FLUID QUALITY: NORMAL
BUN SERPL-MCNC: 51 MG/DL (ref 7–20)
CALCIUM SERPL-MCNC: 7.7 MG/DL (ref 8.3–10.6)
CELL COUNT FLUID TYPE: NORMAL
CHLORIDE SERPL-SCNC: 95 MMOL/L (ref 99–110)
CO2 SERPL-SCNC: 31 MMOL/L (ref 21–32)
COLOR FLUID: NORMAL
CREAT SERPL-MCNC: 3.2 MG/DL (ref 0.6–1.2)
DEPRECATED RDW RBC AUTO: 17 % (ref 12.4–15.4)
EOSINOPHIL # BLD: 0 K/UL (ref 0–0.6)
EOSINOPHIL NFR BLD: 0.7 %
GFR SERPLBLD CREATININE-BSD FMLA CKD-EPI: 15 ML/MIN/{1.73_M2}
GLUCOSE BLD-MCNC: 82 MG/DL (ref 70–99)
GLUCOSE BLD-MCNC: 82 MG/DL (ref 70–99)
GLUCOSE SERPL-MCNC: 70 MG/DL (ref 70–99)
HCT VFR BLD AUTO: 21.2 % (ref 36–48)
HCT VFR BLD AUTO: 23.2 % (ref 36–48)
HGB BLD-MCNC: 7.2 G/DL (ref 12–16)
HGB BLD-MCNC: 7.8 G/DL (ref 12–16)
IRON SATN MFR SERPL: 17 % (ref 15–50)
IRON SERPL-MCNC: 23 UG/DL (ref 37–145)
LDH SERPL L TO P-CCNC: 399 U/L (ref 100–190)
LYMPHOCYTES # BLD: 0.2 K/UL (ref 1–5.1)
LYMPHOCYTES NFR BLD: 4.9 %
LYMPHOCYTES NFR FLD: 20 %
MCH RBC QN AUTO: 30.8 PG (ref 26–34)
MCHC RBC AUTO-ENTMCNC: 34.2 G/DL (ref 31–36)
MCV RBC AUTO: 89.9 FL (ref 80–100)
MONOCYTES # BLD: 0.2 K/UL (ref 0–1.3)
MONOCYTES NFR BLD: 6.2 %
MONOCYTES NFR FLD: 31 %
MRSA DNA SPEC QL NAA+PROBE: NORMAL
MYCOBACTERIUM SPEC CULT: NORMAL
MYCOBACTERIUM SPEC CULT: NORMAL
NEUTROPHIL, FLUID: 49 %
NEUTROPHILS # BLD: 2.7 K/UL (ref 1.7–7.7)
NEUTROPHILS NFR BLD: 87.8 %
NUC CELL # FLD: 165 /CUMM
PERFORMED ON: NORMAL
PERFORMED ON: NORMAL
PLATELET # BLD AUTO: 117 K/UL (ref 135–450)
PMV BLD AUTO: 8.3 FL (ref 5–10.5)
POTASSIUM SERPL-SCNC: 3.9 MMOL/L (ref 3.5–5.1)
PROCALCITONIN SERPL IA-MCNC: 1.4 NG/ML (ref 0–0.15)
PROT SERPL-MCNC: 5.2 G/DL (ref 6.4–8.2)
RBC # BLD AUTO: 2.35 M/UL (ref 4–5.2)
RBC FLUID: NORMAL /CUMM
REPORT: NORMAL
SODIUM SERPL-SCNC: 135 MMOL/L (ref 136–145)
TIBC SERPL-MCNC: 138 UG/DL (ref 260–445)
TOTAL CELLS COUNTED FLD: 100
VANCOMYCIN SERPL-MCNC: 18.5 UG/ML
WBC # BLD AUTO: 3.1 K/UL (ref 4–11)

## 2025-01-07 PROCEDURE — 2700000000 HC OXYGEN THERAPY PER DAY

## 2025-01-07 PROCEDURE — 87205 SMEAR GRAM STAIN: CPT

## 2025-01-07 PROCEDURE — 32555 ASPIRATE PLEURA W/ IMAGING: CPT

## 2025-01-07 PROCEDURE — 99233 SBSQ HOSP IP/OBS HIGH 50: CPT | Performed by: INTERNAL MEDICINE

## 2025-01-07 PROCEDURE — 87206 SMEAR FLUORESCENT/ACID STAI: CPT

## 2025-01-07 PROCEDURE — 6360000002 HC RX W HCPCS

## 2025-01-07 PROCEDURE — 6360000002 HC RX W HCPCS: Performed by: INTERNAL MEDICINE

## 2025-01-07 PROCEDURE — 2580000003 HC RX 258: Performed by: INTERNAL MEDICINE

## 2025-01-07 PROCEDURE — 88305 TISSUE EXAM BY PATHOLOGIST: CPT

## 2025-01-07 PROCEDURE — 94761 N-INVAS EAR/PLS OXIMETRY MLT: CPT

## 2025-01-07 PROCEDURE — 99223 1ST HOSP IP/OBS HIGH 75: CPT | Performed by: INTERNAL MEDICINE

## 2025-01-07 PROCEDURE — 80048 BASIC METABOLIC PNL TOTAL CA: CPT

## 2025-01-07 PROCEDURE — 6370000000 HC RX 637 (ALT 250 FOR IP)

## 2025-01-07 PROCEDURE — 84155 ASSAY OF PROTEIN SERUM: CPT

## 2025-01-07 PROCEDURE — 6370000000 HC RX 637 (ALT 250 FOR IP): Performed by: INTERNAL MEDICINE

## 2025-01-07 PROCEDURE — 83615 LACTATE (LD) (LDH) ENZYME: CPT

## 2025-01-07 PROCEDURE — 51798 US URINE CAPACITY MEASURE: CPT

## 2025-01-07 PROCEDURE — 87070 CULTURE OTHR SPECIMN AEROBIC: CPT

## 2025-01-07 PROCEDURE — 71045 X-RAY EXAM CHEST 1 VIEW: CPT

## 2025-01-07 PROCEDURE — 88184 FLOWCYTOMETRY/ TC 1 MARKER: CPT

## 2025-01-07 PROCEDURE — 2500000003 HC RX 250 WO HCPCS

## 2025-01-07 PROCEDURE — 1200000000 HC SEMI PRIVATE

## 2025-01-07 PROCEDURE — 94640 AIRWAY INHALATION TREATMENT: CPT

## 2025-01-07 PROCEDURE — 84157 ASSAY OF PROTEIN OTHER: CPT

## 2025-01-07 PROCEDURE — 83550 IRON BINDING TEST: CPT

## 2025-01-07 PROCEDURE — 89051 BODY FLUID CELL COUNT: CPT

## 2025-01-07 PROCEDURE — 84145 PROCALCITONIN (PCT): CPT

## 2025-01-07 PROCEDURE — 2580000003 HC RX 258

## 2025-01-07 PROCEDURE — 80202 ASSAY OF VANCOMYCIN: CPT

## 2025-01-07 PROCEDURE — 88112 CYTOPATH CELL ENHANCE TECH: CPT

## 2025-01-07 PROCEDURE — 83540 ASSAY OF IRON: CPT

## 2025-01-07 PROCEDURE — 85025 COMPLETE CBC W/AUTO DIFF WBC: CPT

## 2025-01-07 PROCEDURE — 88185 FLOWCYTOMETRY/TC ADD-ON: CPT

## 2025-01-07 PROCEDURE — 0W993ZZ DRAINAGE OF RIGHT PLEURAL CAVITY, PERCUTANEOUS APPROACH: ICD-10-PCS | Performed by: STUDENT IN AN ORGANIZED HEALTH CARE EDUCATION/TRAINING PROGRAM

## 2025-01-07 PROCEDURE — 85018 HEMOGLOBIN: CPT

## 2025-01-07 PROCEDURE — 2500000003 HC RX 250 WO HCPCS: Performed by: INTERNAL MEDICINE

## 2025-01-07 PROCEDURE — 85014 HEMATOCRIT: CPT

## 2025-01-07 PROCEDURE — 87116 MYCOBACTERIA CULTURE: CPT

## 2025-01-07 RX ORDER — LEVOFLOXACIN 500 MG/1
500 TABLET, FILM COATED ORAL EVERY OTHER DAY
Status: DISCONTINUED | OUTPATIENT
Start: 2025-01-08 | End: 2025-01-08

## 2025-01-07 RX ORDER — LEVOFLOXACIN 750 MG/1
750 TABLET, FILM COATED ORAL ONCE
Status: COMPLETED | OUTPATIENT
Start: 2025-01-07 | End: 2025-01-07

## 2025-01-07 RX ADMIN — SODIUM CHLORIDE 40 MG: 9 INJECTION INTRAMUSCULAR; INTRAVENOUS; SUBCUTANEOUS at 23:20

## 2025-01-07 RX ADMIN — BUSPIRONE HYDROCHLORIDE 10 MG: 10 TABLET ORAL at 23:19

## 2025-01-07 RX ADMIN — WATER 60 MG: 1 INJECTION INTRAMUSCULAR; INTRAVENOUS; SUBCUTANEOUS at 23:29

## 2025-01-07 RX ADMIN — SODIUM CHLORIDE, PRESERVATIVE FREE 10 ML: 5 INJECTION INTRAVENOUS at 09:03

## 2025-01-07 RX ADMIN — SODIUM CHLORIDE 40 MG: 9 INJECTION INTRAMUSCULAR; INTRAVENOUS; SUBCUTANEOUS at 09:08

## 2025-01-07 RX ADMIN — HYDROCODONE BITARTRATE AND ACETAMINOPHEN 1 TABLET: 5; 325 TABLET ORAL at 23:19

## 2025-01-07 RX ADMIN — CEFEPIME 1000 MG: 1 INJECTION, POWDER, FOR SOLUTION INTRAMUSCULAR; INTRAVENOUS at 09:03

## 2025-01-07 RX ADMIN — HYDROCODONE BITARTRATE AND ACETAMINOPHEN 1 TABLET: 5; 325 TABLET ORAL at 09:08

## 2025-01-07 RX ADMIN — LEVOFLOXACIN 750 MG: 750 TABLET, FILM COATED ORAL at 16:02

## 2025-01-07 RX ADMIN — BUSPIRONE HYDROCHLORIDE 10 MG: 10 TABLET ORAL at 14:17

## 2025-01-07 RX ADMIN — HYDROCODONE BITARTRATE AND ACETAMINOPHEN 1 TABLET: 5; 325 TABLET ORAL at 14:17

## 2025-01-07 RX ADMIN — PREDNISONE 60 MG: 20 TABLET ORAL at 09:03

## 2025-01-07 RX ADMIN — Medication: at 09:28

## 2025-01-07 RX ADMIN — TRAZODONE HYDROCHLORIDE 25 MG: 50 TABLET ORAL at 23:19

## 2025-01-07 RX ADMIN — FOLIC ACID 1 MG: 1 TABLET ORAL at 09:03

## 2025-01-07 RX ADMIN — IPRATROPIUM BROMIDE AND ALBUTEROL SULFATE 1 DOSE: 2.5; .5 SOLUTION RESPIRATORY (INHALATION) at 07:22

## 2025-01-07 RX ADMIN — BUSPIRONE HYDROCHLORIDE 10 MG: 10 TABLET ORAL at 09:03

## 2025-01-07 RX ADMIN — IPRATROPIUM BROMIDE AND ALBUTEROL SULFATE 1 DOSE: 2.5; .5 SOLUTION RESPIRATORY (INHALATION) at 21:16

## 2025-01-07 ASSESSMENT — PAIN - FUNCTIONAL ASSESSMENT
PAIN_FUNCTIONAL_ASSESSMENT: PREVENTS OR INTERFERES WITH MANY ACTIVE NOT PASSIVE ACTIVITIES
PAIN_FUNCTIONAL_ASSESSMENT: PREVENTS OR INTERFERES SOME ACTIVE ACTIVITIES AND ADLS

## 2025-01-07 ASSESSMENT — PAIN SCALES - GENERAL
PAINLEVEL_OUTOF10: 7
PAINLEVEL_OUTOF10: 10
PAINLEVEL_OUTOF10: 0
PAINLEVEL_OUTOF10: 6
PAINLEVEL_OUTOF10: 0

## 2025-01-07 ASSESSMENT — PAIN DESCRIPTION - LOCATION
LOCATION: GENERALIZED

## 2025-01-07 ASSESSMENT — PAIN DESCRIPTION - DESCRIPTORS
DESCRIPTORS: DISCOMFORT;SORE
DESCRIPTORS: DISCOMFORT;SORE

## 2025-01-07 ASSESSMENT — PAIN DESCRIPTION - ONSET
ONSET: ON-GOING
ONSET: SUDDEN

## 2025-01-07 ASSESSMENT — PAIN DESCRIPTION - DIRECTION: RADIATING_TOWARDS: DENIES

## 2025-01-07 ASSESSMENT — PAIN DESCRIPTION - FREQUENCY
FREQUENCY: CONTINUOUS
FREQUENCY: INTERMITTENT

## 2025-01-07 ASSESSMENT — PAIN DESCRIPTION - ORIENTATION
ORIENTATION: RIGHT;LEFT
ORIENTATION: RIGHT;LEFT

## 2025-01-07 ASSESSMENT — PAIN DESCRIPTION - PAIN TYPE
TYPE: CHRONIC PAIN
TYPE: CHRONIC PAIN

## 2025-01-07 NOTE — PLAN OF CARE
Problem: Discharge Planning  Goal: Discharge to home or other facility with appropriate resources  1/7/2025 1317 by Alie Rubin RN  Outcome: Progressing  1/7/2025 0156 by Candice Delaney RN  Outcome: Progressing  Flowsheets (Taken 1/6/2025 1755 by Alie Rubin RN)  Discharge to home or other facility with appropriate resources: Identify barriers to discharge with patient and caregiver     Problem: Pain  Goal: Verbalizes/displays adequate comfort level or baseline comfort level  1/7/2025 1317 by Alie Rubin RN  Outcome: Progressing  Flowsheets (Taken 1/7/2025 0901)  Verbalizes/displays adequate comfort level or baseline comfort level:   Encourage patient to monitor pain and request assistance   Assess pain using appropriate pain scale  1/7/2025 0156 by Candice Delaney RN  Outcome: Progressing  Flowsheets  Taken 1/6/2025 1836 by Alie Rubin RN  Verbalizes/displays adequate comfort level or baseline comfort level:   Encourage patient to monitor pain and request assistance   Assess pain using appropriate pain scale  Taken 1/6/2025 1556 by Alie Rubin RN  Verbalizes/displays adequate comfort level or baseline comfort level:   Encourage patient to monitor pain and request assistance   Assess pain using appropriate pain scale     Problem: Safety - Adult  Goal: Free from fall injury  1/7/2025 1317 by Alie Rubin RN  Outcome: Progressing  1/7/2025 0156 by Candice Delaney RN  Outcome: Progressing

## 2025-01-07 NOTE — PLAN OF CARE
Problem: Discharge Planning  Goal: Discharge to home or other facility with appropriate resources  Outcome: Progressing  Flowsheets (Taken 1/6/2025 1755 by Alie Rubin RN)  Discharge to home or other facility with appropriate resources: Identify barriers to discharge with patient and caregiver     Problem: Pain  Goal: Verbalizes/displays adequate comfort level or baseline comfort level  Outcome: Progressing  Flowsheets  Taken 1/6/2025 1836 by Alie Rubin RN  Verbalizes/displays adequate comfort level or baseline comfort level:   Encourage patient to monitor pain and request assistance   Assess pain using appropriate pain scale  Taken 1/6/2025 1556 by Alie Rubin, RN  Verbalizes/displays adequate comfort level or baseline comfort level:   Encourage patient to monitor pain and request assistance   Assess pain using appropriate pain scale     Problem: Safety - Adult  Goal: Free from fall injury  Outcome: Progressing     Problem: ABCDS Injury Assessment  Goal: Absence of physical injury  Outcome: Progressing     Problem: Skin/Tissue Integrity  Goal: Absence of new skin breakdown  Description: 1.  Monitor for areas of redness and/or skin breakdown  2.  Assess vascular access sites hourly  3.  Every 4-6 hours minimum:  Change oxygen saturation probe site  4.  Every 4-6 hours:  If on nasal continuous positive airway pressure, respiratory therapy assess nares and determine need for appliance change or resting period.  Outcome: Progressing

## 2025-01-07 NOTE — FLOWSHEET NOTE
01/07/25 0145   Vital Signs   Temp 97.6 °F (36.4 °C)   Temp Source Oral   Pulse 80   Heart Rate Source Monitor   Respirations 16   BP (!) 142/82   MAP (Calculated) 102   BP Location Left upper arm   BP Method Automatic   Patient Position Semi jose de jesuswlers   Pain Assessment   Pain Assessment None - Denies Pain   Pain Level 0   Opioid-Induced Sedation   POSS Score 1   Oxygen Therapy   SpO2 97 %   O2 Device Nasal cannula   O2 Flow Rate (L/min) 4 L/min   Height and Weight   Weight - Scale 72.2 kg (159 lb 1.6 oz)   Weight Method Bed scale   BMI (Calculated) 26.5     Pt VS as shown above.

## 2025-01-07 NOTE — FLOWSHEET NOTE
01/06/25 2011   Vital Signs   Temp 98.1 °F (36.7 °C)   Temp Source Oral   Pulse 84   Heart Rate Source Monitor   Respirations 18   /79   MAP (Calculated) 98   BP Location Left upper arm   BP Method Automatic   Patient Position Semi fowlers   Pain Assessment   Pain Assessment 0-10   Pain Level 10   Patient's Stated Pain Goal 0 - No pain   Pain Location Generalized;Neck   Pain Orientation Right;Left   Pain Descriptors Aching   Functional Pain Assessment Prevents or interferes some active activities and ADLs   Pain Type Chronic pain   Pain Radiating Towards NA   Pain Frequency Continuous   Pain Onset Progressive   Non-Pharmaceutical Pain Intervention(s) Rest;Repositioned   Opioid-Induced Sedation   POSS Score 1   Oxygen Therapy   SpO2 94 %   O2 Device Nasal cannula   O2 Flow Rate (L/min) 4 L/min     Pt awake in bed. Pt alert and orientedX4. Pt states a pain level of 10 on a 0-10 pain scale. Pt given PRN [place]. Administration can be found in the MAR. Assessment complete. Meds passed. Pt denies needs at this time.        Bedside Mobility Assessment Tool (BMAT):     Assessment Level 1- Sit and Shake    1. From a semi-reclined position, ask patient to sit up and rotate to a seated position at the side of the bed. Can use the bedrail.    2. Ask patient to reach out and grab your hand and shake making sure patient reaches across his/her midline.   Fail- Patient is unable to perform tasks, patient is MOBILITY LEVEL 1.    Assessment Level 2- Stretch and Point   1. With patient in seated position at the side of the bed, have patient place both feet on the floor (or stool) with knees no higher than hips.    2. Ask patient to stretch one leg and straighten the knee, then bend the ankle/flex and point the toes. If appropriate, repeat with the other leg.   Fail- Patient is unable to complete task. Patient is MOBILITY LEVEL 2.     Assessment Level 3- Stand   1. Ask patient to elevate off the bed or chair (seated to

## 2025-01-08 LAB
ACID FAST STN SPEC QL: NORMAL
ANION GAP SERPL CALCULATED.3IONS-SCNC: 12 MMOL/L (ref 3–16)
BASOPHILS # BLD: 0 K/UL (ref 0–0.2)
BASOPHILS NFR BLD: 0.2 %
BUN SERPL-MCNC: 63 MG/DL (ref 7–20)
CALCIUM SERPL-MCNC: 7.8 MG/DL (ref 8.3–10.6)
CHLORIDE SERPL-SCNC: 93 MMOL/L (ref 99–110)
CO2 SERPL-SCNC: 29 MMOL/L (ref 21–32)
CREAT SERPL-MCNC: 3.5 MG/DL (ref 0.6–1.2)
DEPRECATED RDW RBC AUTO: 17.2 % (ref 12.4–15.4)
EOSINOPHIL # BLD: 0 K/UL (ref 0–0.6)
EOSINOPHIL NFR BLD: 0 %
GFR SERPLBLD CREATININE-BSD FMLA CKD-EPI: 14 ML/MIN/{1.73_M2}
GLUCOSE SERPL-MCNC: 90 MG/DL (ref 70–99)
HCT VFR BLD AUTO: 21.8 % (ref 36–48)
HCT VFR BLD AUTO: 22.3 % (ref 36–48)
HCT VFR BLD AUTO: 23.1 % (ref 36–48)
HCT VFR BLD AUTO: 28.4 % (ref 36–48)
HGB BLD-MCNC: 7.4 G/DL (ref 12–16)
HGB BLD-MCNC: 7.6 G/DL (ref 12–16)
HGB BLD-MCNC: 7.9 G/DL (ref 12–16)
HGB BLD-MCNC: 9.6 G/DL (ref 12–16)
LDH FLD L TO P-CCNC: 181 U/L
LYMPHOCYTES # BLD: 0.1 K/UL (ref 1–5.1)
LYMPHOCYTES NFR BLD: 2.5 %
MCH RBC QN AUTO: 30.4 PG (ref 26–34)
MCHC RBC AUTO-ENTMCNC: 33.9 G/DL (ref 31–36)
MCV RBC AUTO: 89.6 FL (ref 80–100)
MONOCYTES # BLD: 0.1 K/UL (ref 0–1.3)
MONOCYTES NFR BLD: 2.1 %
NEUTROPHILS # BLD: 2.5 K/UL (ref 1.7–7.7)
NEUTROPHILS NFR BLD: 95.2 %
PLATELET # BLD AUTO: 143 K/UL (ref 135–450)
PMV BLD AUTO: 7.7 FL (ref 5–10.5)
POTASSIUM SERPL-SCNC: 4.3 MMOL/L (ref 3.5–5.1)
PROT FLD-MCNC: 2.2 G/DL
RBC # BLD AUTO: 2.49 M/UL (ref 4–5.2)
SODIUM SERPL-SCNC: 134 MMOL/L (ref 136–145)
SPECIMEN SOURCE FLD: NORMAL
VANCOMYCIN SERPL-MCNC: 16.5 UG/ML
WBC # BLD AUTO: 2.6 K/UL (ref 4–11)

## 2025-01-08 PROCEDURE — 94640 AIRWAY INHALATION TREATMENT: CPT

## 2025-01-08 PROCEDURE — 1200000000 HC SEMI PRIVATE

## 2025-01-08 PROCEDURE — 2500000003 HC RX 250 WO HCPCS

## 2025-01-08 PROCEDURE — 97530 THERAPEUTIC ACTIVITIES: CPT

## 2025-01-08 PROCEDURE — 6360000002 HC RX W HCPCS: Performed by: INTERNAL MEDICINE

## 2025-01-08 PROCEDURE — 85018 HEMOGLOBIN: CPT

## 2025-01-08 PROCEDURE — 2500000003 HC RX 250 WO HCPCS: Performed by: INTERNAL MEDICINE

## 2025-01-08 PROCEDURE — 97163 PT EVAL HIGH COMPLEX 45 MIN: CPT

## 2025-01-08 PROCEDURE — 92526 ORAL FUNCTION THERAPY: CPT

## 2025-01-08 PROCEDURE — 80202 ASSAY OF VANCOMYCIN: CPT

## 2025-01-08 PROCEDURE — 99233 SBSQ HOSP IP/OBS HIGH 50: CPT | Performed by: INTERNAL MEDICINE

## 2025-01-08 PROCEDURE — 97166 OT EVAL MOD COMPLEX 45 MIN: CPT

## 2025-01-08 PROCEDURE — 85014 HEMATOCRIT: CPT

## 2025-01-08 PROCEDURE — 85025 COMPLETE CBC W/AUTO DIFF WBC: CPT

## 2025-01-08 PROCEDURE — 2700000000 HC OXYGEN THERAPY PER DAY

## 2025-01-08 PROCEDURE — 6370000000 HC RX 637 (ALT 250 FOR IP): Performed by: INTERNAL MEDICINE

## 2025-01-08 PROCEDURE — 5A1D70Z PERFORMANCE OF URINARY FILTRATION, INTERMITTENT, LESS THAN 6 HOURS PER DAY: ICD-10-PCS | Performed by: INTERNAL MEDICINE

## 2025-01-08 PROCEDURE — 80048 BASIC METABOLIC PNL TOTAL CA: CPT

## 2025-01-08 PROCEDURE — 6360000002 HC RX W HCPCS

## 2025-01-08 PROCEDURE — 6370000000 HC RX 637 (ALT 250 FOR IP): Performed by: PHYSICIAN ASSISTANT

## 2025-01-08 PROCEDURE — 94761 N-INVAS EAR/PLS OXIMETRY MLT: CPT

## 2025-01-08 PROCEDURE — 90935 HEMODIALYSIS ONE EVALUATION: CPT

## 2025-01-08 PROCEDURE — 92610 EVALUATE SWALLOWING FUNCTION: CPT

## 2025-01-08 PROCEDURE — 2580000003 HC RX 258: Performed by: INTERNAL MEDICINE

## 2025-01-08 PROCEDURE — 2580000003 HC RX 258

## 2025-01-08 PROCEDURE — 6370000000 HC RX 637 (ALT 250 FOR IP)

## 2025-01-08 RX ORDER — LEVOFLOXACIN 500 MG/1
500 TABLET, FILM COATED ORAL EVERY OTHER DAY
Status: DISCONTINUED | OUTPATIENT
Start: 2025-01-09 | End: 2025-01-08

## 2025-01-08 RX ORDER — HEPARIN SODIUM 1000 [USP'U]/ML
3200 INJECTION, SOLUTION INTRAVENOUS; SUBCUTANEOUS PRN
Status: CANCELLED | OUTPATIENT
Start: 2025-01-08

## 2025-01-08 RX ORDER — POLYETHYLENE GLYCOL 3350 17 G/17G
17 POWDER, FOR SOLUTION ORAL 2 TIMES DAILY
Status: DISCONTINUED | OUTPATIENT
Start: 2025-01-08 | End: 2025-01-16 | Stop reason: HOSPADM

## 2025-01-08 RX ORDER — HEPARIN SODIUM 1000 [USP'U]/ML
INJECTION, SOLUTION INTRAVENOUS; SUBCUTANEOUS
Status: DISCONTINUED
Start: 2025-01-08 | End: 2025-01-09

## 2025-01-08 RX ADMIN — HYDROCODONE BITARTRATE AND ACETAMINOPHEN 1 TABLET: 5; 325 TABLET ORAL at 13:04

## 2025-01-08 RX ADMIN — WATER 60 MG: 1 INJECTION INTRAMUSCULAR; INTRAVENOUS; SUBCUTANEOUS at 04:17

## 2025-01-08 RX ADMIN — WATER 60 MG: 1 INJECTION INTRAMUSCULAR; INTRAVENOUS; SUBCUTANEOUS at 13:02

## 2025-01-08 RX ADMIN — POLYETHYLENE GLYCOL (3350) 17 G: 17 POWDER, FOR SOLUTION ORAL at 22:35

## 2025-01-08 RX ADMIN — SODIUM CHLORIDE, PRESERVATIVE FREE 10 ML: 5 INJECTION INTRAVENOUS at 08:55

## 2025-01-08 RX ADMIN — HYDROCODONE BITARTRATE AND ACETAMINOPHEN 1 TABLET: 5; 325 TABLET ORAL at 08:54

## 2025-01-08 RX ADMIN — TRAZODONE HYDROCHLORIDE 25 MG: 50 TABLET ORAL at 22:26

## 2025-01-08 RX ADMIN — POLYETHYLENE GLYCOL (3350) 17 G: 17 POWDER, FOR SOLUTION ORAL at 13:04

## 2025-01-08 RX ADMIN — ONDANSETRON 4 MG: 2 INJECTION INTRAMUSCULAR; INTRAVENOUS at 22:09

## 2025-01-08 RX ADMIN — IPRATROPIUM BROMIDE AND ALBUTEROL SULFATE 1 DOSE: 2.5; .5 SOLUTION RESPIRATORY (INHALATION) at 19:58

## 2025-01-08 RX ADMIN — FOLIC ACID 1 MG: 1 TABLET ORAL at 08:54

## 2025-01-08 RX ADMIN — HYDROCODONE BITARTRATE AND ACETAMINOPHEN 1 TABLET: 5; 325 TABLET ORAL at 22:26

## 2025-01-08 RX ADMIN — WATER 60 MG: 1 INJECTION INTRAMUSCULAR; INTRAVENOUS; SUBCUTANEOUS at 22:22

## 2025-01-08 RX ADMIN — BUSPIRONE HYDROCHLORIDE 10 MG: 10 TABLET ORAL at 13:04

## 2025-01-08 RX ADMIN — BUSPIRONE HYDROCHLORIDE 10 MG: 10 TABLET ORAL at 22:26

## 2025-01-08 RX ADMIN — EPOETIN ALFA-EPBX 10000 UNITS: 10000 INJECTION, SOLUTION INTRAVENOUS; SUBCUTANEOUS at 18:05

## 2025-01-08 RX ADMIN — SULFAMETHOXAZOLE AND TRIMETHOPRIM 0.5 TABLET: 800; 160 TABLET ORAL at 08:54

## 2025-01-08 RX ADMIN — SODIUM CHLORIDE 40 MG: 9 INJECTION INTRAMUSCULAR; INTRAVENOUS; SUBCUTANEOUS at 10:10

## 2025-01-08 RX ADMIN — IPRATROPIUM BROMIDE AND ALBUTEROL SULFATE 1 DOSE: 2.5; .5 SOLUTION RESPIRATORY (INHALATION) at 07:42

## 2025-01-08 RX ADMIN — BUSPIRONE HYDROCHLORIDE 10 MG: 10 TABLET ORAL at 08:54

## 2025-01-08 RX ADMIN — CEFAZOLIN 2000 MG: 2 INJECTION, POWDER, FOR SOLUTION INTRAVENOUS at 10:10

## 2025-01-08 RX ADMIN — ONDANSETRON 4 MG: 2 INJECTION INTRAMUSCULAR; INTRAVENOUS at 09:16

## 2025-01-08 RX ADMIN — SODIUM CHLORIDE 40 MG: 9 INJECTION INTRAMUSCULAR; INTRAVENOUS; SUBCUTANEOUS at 22:19

## 2025-01-08 RX ADMIN — ONDANSETRON 4 MG: 2 INJECTION INTRAMUSCULAR; INTRAVENOUS at 04:30

## 2025-01-08 ASSESSMENT — PAIN DESCRIPTION - DESCRIPTORS
DESCRIPTORS: ACHING;DISCOMFORT
DESCRIPTORS: ACHING;DISCOMFORT;SORE

## 2025-01-08 ASSESSMENT — PAIN SCALES - GENERAL
PAINLEVEL_OUTOF10: 0
PAINLEVEL_OUTOF10: 0
PAINLEVEL_OUTOF10: 6
PAINLEVEL_OUTOF10: 4

## 2025-01-08 ASSESSMENT — PAIN DESCRIPTION - ORIENTATION
ORIENTATION: RIGHT;LEFT
ORIENTATION: RIGHT;LEFT

## 2025-01-08 ASSESSMENT — PAIN DESCRIPTION - DIRECTION: RADIATING_TOWARDS: DENIES

## 2025-01-08 ASSESSMENT — PAIN - FUNCTIONAL ASSESSMENT
PAIN_FUNCTIONAL_ASSESSMENT: PREVENTS OR INTERFERES SOME ACTIVE ACTIVITIES AND ADLS
PAIN_FUNCTIONAL_ASSESSMENT: PREVENTS OR INTERFERES WITH ALL ACTIVE AND SOME PASSIVE ACTIVITIES

## 2025-01-08 ASSESSMENT — PAIN DESCRIPTION - ONSET
ONSET: SUDDEN
ONSET: ON-GOING

## 2025-01-08 ASSESSMENT — PAIN DESCRIPTION - PAIN TYPE
TYPE: CHRONIC PAIN
TYPE: CHRONIC PAIN

## 2025-01-08 ASSESSMENT — PAIN DESCRIPTION - FREQUENCY
FREQUENCY: INTERMITTENT
FREQUENCY: CONTINUOUS

## 2025-01-08 ASSESSMENT — PAIN DESCRIPTION - LOCATION
LOCATION: GENERALIZED
LOCATION: GENERALIZED

## 2025-01-08 NOTE — DIALYSIS
Treatment time: 3.0 hr.  Net UF: 1500    Pre weight: 165.0 kg  Post weight: 163.5 kg  EDW: TBW    Access used: Rt CW TDC  Access function: Well    Medications or blood products given: Retacrit 10,000 units    Regular outpatient schedule: M-W-F    Summary of response to treatment: Patient tolerated well.  Patient remained stable throughout treatment and exiting HD suite.  Report called to primary nurse Alie Rubin RN.

## 2025-01-08 NOTE — FLOWSHEET NOTE
01/08/25 0400   Vital Signs   Temp 97.5 °F (36.4 °C)   Temp Source Oral   Pulse 71   Heart Rate Source Monitor   Respirations 16   /81   MAP (Calculated) 99   BP Location Left upper arm   BP Method Automatic   Patient Position Semi wlers   Pain Assessment   Pain Assessment None - Denies Pain   Pain Level 0   Opioid-Induced Sedation   POSS Score 1   Oxygen Therapy   SpO2 96 %   O2 Device Nasal cannula   O2 Flow Rate (L/min) 4 L/min     Pt VS as shown above.

## 2025-01-08 NOTE — PLAN OF CARE
Problem: Discharge Planning  Goal: Discharge to home or other facility with appropriate resources  Outcome: Progressing     Problem: Pain  Goal: Verbalizes/displays adequate comfort level or baseline comfort level  Outcome: Progressing  Flowsheets (Taken 1/7/2025 8601 by Alie Rubin RN)  Verbalizes/displays adequate comfort level or baseline comfort level:   Encourage patient to monitor pain and request assistance   Assess pain using appropriate pain scale     Problem: Safety - Adult  Goal: Free from fall injury  Outcome: Progressing     Problem: ABCDS Injury Assessment  Goal: Absence of physical injury  Outcome: Progressing     Problem: Skin/Tissue Integrity  Goal: Absence of new skin breakdown  Description: 1.  Monitor for areas of redness and/or skin breakdown  2.  Assess vascular access sites hourly  3.  Every 4-6 hours minimum:  Change oxygen saturation probe site  4.  Every 4-6 hours:  If on nasal continuous positive airway pressure, respiratory therapy assess nares and determine need for appliance change or resting period.  Outcome: Progressing

## 2025-01-08 NOTE — PLAN OF CARE
Problem: Discharge Planning  Goal: Discharge to home or other facility with appropriate resources  1/8/2025 1726 by Alie Rubin RN  Outcome: Progressing  1/8/2025 0327 by Candice Delaney RN  Outcome: Progressing     Problem: Pain  Goal: Verbalizes/displays adequate comfort level or baseline comfort level  1/8/2025 1726 by Alie Rubin RN  Outcome: Progressing  Flowsheets  Taken 1/8/2025 1456  Verbalizes/displays adequate comfort level or baseline comfort level:   Encourage patient to monitor pain and request assistance   Assess pain using appropriate pain scale  Taken 1/8/2025 0849  Verbalizes/displays adequate comfort level or baseline comfort level:   Encourage patient to monitor pain and request assistance   Assess pain using appropriate pain scale  1/8/2025 0327 by Candice Delaney RN  Outcome: Progressing  Flowsheets (Taken 1/7/2025 1452 by Alie Rubin RN)  Verbalizes/displays adequate comfort level or baseline comfort level:   Encourage patient to monitor pain and request assistance   Assess pain using appropriate pain scale     Problem: Safety - Adult  Goal: Free from fall injury  1/8/2025 1726 by Alie Rubin RN  Outcome: Progressing  1/8/2025 0327 by Candice Delaney RN  Outcome: Progressing

## 2025-01-09 LAB
ANION GAP SERPL CALCULATED.3IONS-SCNC: 8 MMOL/L (ref 3–16)
BACTERIA BLD CULT ORG #2: ABNORMAL
BACTERIA BLD CULT: ABNORMAL
BACTERIA BLD CULT: ABNORMAL
BACTERIA SPEC RESP CULT: NORMAL
BASOPHILS # BLD: 0 K/UL (ref 0–0.2)
BASOPHILS NFR BLD: 0.3 %
BUN SERPL-MCNC: 36 MG/DL (ref 7–20)
CALCIUM SERPL-MCNC: 7.8 MG/DL (ref 8.3–10.6)
CHLORIDE SERPL-SCNC: 95 MMOL/L (ref 99–110)
CO2 SERPL-SCNC: 30 MMOL/L (ref 21–32)
CREAT SERPL-MCNC: 2.4 MG/DL (ref 0.6–1.2)
DEPRECATED RDW RBC AUTO: 18.1 % (ref 12.4–15.4)
EOSINOPHIL # BLD: 0 K/UL (ref 0–0.6)
EOSINOPHIL NFR BLD: 0 %
GFR SERPLBLD CREATININE-BSD FMLA CKD-EPI: 21 ML/MIN/{1.73_M2}
GLUCOSE SERPL-MCNC: 103 MG/DL (ref 70–99)
GRAM STN SPEC: NORMAL
HCT VFR BLD AUTO: 21.5 % (ref 36–48)
HCT VFR BLD AUTO: 22.1 % (ref 36–48)
HCT VFR BLD AUTO: 22.9 % (ref 36–48)
HGB BLD-MCNC: 7.2 G/DL (ref 12–16)
HGB BLD-MCNC: 7.3 G/DL (ref 12–16)
HGB BLD-MCNC: 7.8 G/DL (ref 12–16)
LYMPHOCYTES # BLD: 0.1 K/UL (ref 1–5.1)
LYMPHOCYTES NFR BLD: 3.4 %
MCH RBC QN AUTO: 30.6 PG (ref 26–34)
MCHC RBC AUTO-ENTMCNC: 33.8 G/DL (ref 31–36)
MCV RBC AUTO: 90.4 FL (ref 80–100)
MONOCYTES # BLD: 0.1 K/UL (ref 0–1.3)
MONOCYTES NFR BLD: 3.3 %
NEUTROPHILS # BLD: 3 K/UL (ref 1.7–7.7)
NEUTROPHILS NFR BLD: 93 %
ORGANISM: ABNORMAL
PLATELET # BLD AUTO: 160 K/UL (ref 135–450)
PMV BLD AUTO: 7.5 FL (ref 5–10.5)
POTASSIUM SERPL-SCNC: 4.1 MMOL/L (ref 3.5–5.1)
RBC # BLD AUTO: 2.54 M/UL (ref 4–5.2)
SODIUM SERPL-SCNC: 133 MMOL/L (ref 136–145)
WBC # BLD AUTO: 3.3 K/UL (ref 4–11)

## 2025-01-09 PROCEDURE — 94640 AIRWAY INHALATION TREATMENT: CPT

## 2025-01-09 PROCEDURE — 1200000000 HC SEMI PRIVATE

## 2025-01-09 PROCEDURE — 85018 HEMOGLOBIN: CPT

## 2025-01-09 PROCEDURE — 2580000003 HC RX 258

## 2025-01-09 PROCEDURE — 85025 COMPLETE CBC W/AUTO DIFF WBC: CPT

## 2025-01-09 PROCEDURE — 94761 N-INVAS EAR/PLS OXIMETRY MLT: CPT

## 2025-01-09 PROCEDURE — 99233 SBSQ HOSP IP/OBS HIGH 50: CPT | Performed by: INTERNAL MEDICINE

## 2025-01-09 PROCEDURE — 2500000003 HC RX 250 WO HCPCS

## 2025-01-09 PROCEDURE — 2500000003 HC RX 250 WO HCPCS: Performed by: INTERNAL MEDICINE

## 2025-01-09 PROCEDURE — 6370000000 HC RX 637 (ALT 250 FOR IP): Performed by: PHYSICIAN ASSISTANT

## 2025-01-09 PROCEDURE — 6360000002 HC RX W HCPCS: Performed by: INTERNAL MEDICINE

## 2025-01-09 PROCEDURE — 2700000000 HC OXYGEN THERAPY PER DAY

## 2025-01-09 PROCEDURE — 2580000003 HC RX 258: Performed by: INTERNAL MEDICINE

## 2025-01-09 PROCEDURE — 80048 BASIC METABOLIC PNL TOTAL CA: CPT

## 2025-01-09 PROCEDURE — 6370000000 HC RX 637 (ALT 250 FOR IP)

## 2025-01-09 PROCEDURE — 85014 HEMATOCRIT: CPT

## 2025-01-09 PROCEDURE — 6370000000 HC RX 637 (ALT 250 FOR IP): Performed by: INTERNAL MEDICINE

## 2025-01-09 PROCEDURE — 87040 BLOOD CULTURE FOR BACTERIA: CPT

## 2025-01-09 PROCEDURE — 6360000002 HC RX W HCPCS

## 2025-01-09 RX ORDER — PREDNISONE 20 MG/1
60 TABLET ORAL DAILY
Status: DISCONTINUED | OUTPATIENT
Start: 2025-01-10 | End: 2025-01-13

## 2025-01-09 RX ORDER — PANTOPRAZOLE SODIUM 40 MG/1
40 TABLET, DELAYED RELEASE ORAL
Status: DISCONTINUED | OUTPATIENT
Start: 2025-01-10 | End: 2025-01-16 | Stop reason: HOSPADM

## 2025-01-09 RX ADMIN — WATER 60 MG: 1 INJECTION INTRAMUSCULAR; INTRAVENOUS; SUBCUTANEOUS at 05:22

## 2025-01-09 RX ADMIN — SODIUM CHLORIDE, PRESERVATIVE FREE 10 ML: 5 INJECTION INTRAVENOUS at 19:30

## 2025-01-09 RX ADMIN — POLYETHYLENE GLYCOL (3350) 17 G: 17 POWDER, FOR SOLUTION ORAL at 08:13

## 2025-01-09 RX ADMIN — WATER 60 MG: 1 INJECTION INTRAMUSCULAR; INTRAVENOUS; SUBCUTANEOUS at 19:40

## 2025-01-09 RX ADMIN — IPRATROPIUM BROMIDE AND ALBUTEROL SULFATE 1 DOSE: 2.5; .5 SOLUTION RESPIRATORY (INHALATION) at 08:11

## 2025-01-09 RX ADMIN — BUSPIRONE HYDROCHLORIDE 10 MG: 10 TABLET ORAL at 14:52

## 2025-01-09 RX ADMIN — BUSPIRONE HYDROCHLORIDE 10 MG: 10 TABLET ORAL at 08:13

## 2025-01-09 RX ADMIN — HYDROCODONE BITARTRATE AND ACETAMINOPHEN 1 TABLET: 5; 325 TABLET ORAL at 08:13

## 2025-01-09 RX ADMIN — BUSPIRONE HYDROCHLORIDE 10 MG: 10 TABLET ORAL at 19:31

## 2025-01-09 RX ADMIN — FOLIC ACID 1 MG: 1 TABLET ORAL at 08:13

## 2025-01-09 RX ADMIN — SODIUM CHLORIDE, PRESERVATIVE FREE 10 ML: 5 INJECTION INTRAVENOUS at 08:14

## 2025-01-09 RX ADMIN — WATER 60 MG: 1 INJECTION INTRAMUSCULAR; INTRAVENOUS; SUBCUTANEOUS at 14:52

## 2025-01-09 RX ADMIN — CEFAZOLIN 1000 MG: 1 INJECTION, POWDER, FOR SOLUTION INTRAMUSCULAR; INTRAVENOUS at 14:51

## 2025-01-09 RX ADMIN — SODIUM CHLORIDE 40 MG: 9 INJECTION INTRAMUSCULAR; INTRAVENOUS; SUBCUTANEOUS at 10:15

## 2025-01-09 ASSESSMENT — PAIN DESCRIPTION - FREQUENCY: FREQUENCY: CONTINUOUS

## 2025-01-09 ASSESSMENT — PAIN SCALES - GENERAL
PAINLEVEL_OUTOF10: 0
PAINLEVEL_OUTOF10: 8

## 2025-01-09 ASSESSMENT — PAIN DESCRIPTION - ONSET: ONSET: ON-GOING

## 2025-01-09 ASSESSMENT — PAIN - FUNCTIONAL ASSESSMENT: PAIN_FUNCTIONAL_ASSESSMENT: PREVENTS OR INTERFERES WITH ALL ACTIVE AND SOME PASSIVE ACTIVITIES

## 2025-01-09 ASSESSMENT — PAIN DESCRIPTION - LOCATION: LOCATION: GENERALIZED

## 2025-01-09 ASSESSMENT — PAIN DESCRIPTION - DIRECTION: RADIATING_TOWARDS: DENIES

## 2025-01-09 ASSESSMENT — PAIN DESCRIPTION - PAIN TYPE: TYPE: CHRONIC PAIN

## 2025-01-09 ASSESSMENT — PAIN DESCRIPTION - ORIENTATION: ORIENTATION: RIGHT;LEFT

## 2025-01-09 ASSESSMENT — PAIN DESCRIPTION - DESCRIPTORS: DESCRIPTORS: ACHING;DISCOMFORT

## 2025-01-09 NOTE — PLAN OF CARE
Problem: Discharge Planning  Goal: Discharge to home or other facility with appropriate resources  1/9/2025 1116 by Alie Rubin RN  Outcome: Progressing  1/9/2025 0036 by Candice Delaney RN  Outcome: Progressing     Problem: Pain  Goal: Verbalizes/displays adequate comfort level or baseline comfort level  1/9/2025 1116 by Alie Rubin RN  Outcome: Progressing  Flowsheets (Taken 1/9/2025 0809)  Verbalizes/displays adequate comfort level or baseline comfort level:   Encourage patient to monitor pain and request assistance   Assess pain using appropriate pain scale  1/9/2025 0036 by Candice Delaney RN  Outcome: Progressing     Problem: Safety - Adult  Goal: Free from fall injury  1/9/2025 1116 by Alie Rubin RN  Outcome: Progressing  1/9/2025 0036 by Candice Delaney RN  Outcome: Progressing

## 2025-01-09 NOTE — FLOWSHEET NOTE
01/09/25 0238   Vital Signs   Temp 98.4 °F (36.9 °C)   Temp Source Oral   Pulse 79   Heart Rate Source Monitor   Respirations 16   /69   MAP (Calculated) 86   BP Location Left upper arm   BP Method Automatic   Patient Position Semi wlers   Pain Assessment   Pain Assessment None - Denies Pain   Pain Level 0   Opioid-Induced Sedation   POSS Score 1   Oxygen Therapy   SpO2 100 %   O2 Device Nasal cannula   O2 Flow Rate (L/min) 4 L/min     Pt VS as shown above.

## 2025-01-09 NOTE — PLAN OF CARE
Problem: Discharge Planning  Goal: Discharge to home or other facility with appropriate resources  1/9/2025 0036 by Candice Delaney RN  Outcome: Progressing  1/8/2025 1726 by Alie Rubin RN  Outcome: Progressing     Problem: Pain  Goal: Verbalizes/displays adequate comfort level or baseline comfort level  1/9/2025 0036 by Candice Delaney RN  Outcome: Progressing  1/8/2025 1726 by Alie Rubin RN  Outcome: Progressing  Flowsheets  Taken 1/8/2025 1456  Verbalizes/displays adequate comfort level or baseline comfort level:   Encourage patient to monitor pain and request assistance   Assess pain using appropriate pain scale  Taken 1/8/2025 0849  Verbalizes/displays adequate comfort level or baseline comfort level:   Encourage patient to monitor pain and request assistance   Assess pain using appropriate pain scale     Problem: Safety - Adult  Goal: Free from fall injury  1/9/2025 0036 by Candice Delaney RN  Outcome: Progressing  1/8/2025 1726 by Alie Rubin RN  Outcome: Progressing     Problem: ABCDS Injury Assessment  Goal: Absence of physical injury  Outcome: Progressing     Problem: Skin/Tissue Integrity  Goal: Absence of new skin breakdown  Description: 1.  Monitor for areas of redness and/or skin breakdown  2.  Assess vascular access sites hourly  3.  Every 4-6 hours minimum:  Change oxygen saturation probe site  4.  Every 4-6 hours:  If on nasal continuous positive airway pressure, respiratory therapy assess nares and determine need for appliance change or resting period.  Outcome: Progressing

## 2025-01-10 LAB
BACTERIA FLD AEROBE CULT: NORMAL
GRAM STN SPEC: NORMAL

## 2025-01-10 PROCEDURE — 6370000000 HC RX 637 (ALT 250 FOR IP)

## 2025-01-10 PROCEDURE — 99232 SBSQ HOSP IP/OBS MODERATE 35: CPT | Performed by: INTERNAL MEDICINE

## 2025-01-10 PROCEDURE — 6360000002 HC RX W HCPCS

## 2025-01-10 PROCEDURE — 6370000000 HC RX 637 (ALT 250 FOR IP): Performed by: INTERNAL MEDICINE

## 2025-01-10 PROCEDURE — 90935 HEMODIALYSIS ONE EVALUATION: CPT

## 2025-01-10 PROCEDURE — 94640 AIRWAY INHALATION TREATMENT: CPT

## 2025-01-10 PROCEDURE — 6370000000 HC RX 637 (ALT 250 FOR IP): Performed by: PHYSICIAN ASSISTANT

## 2025-01-10 PROCEDURE — 2700000000 HC OXYGEN THERAPY PER DAY

## 2025-01-10 PROCEDURE — 1200000000 HC SEMI PRIVATE

## 2025-01-10 PROCEDURE — 99233 SBSQ HOSP IP/OBS HIGH 50: CPT | Performed by: INTERNAL MEDICINE

## 2025-01-10 PROCEDURE — 2580000003 HC RX 258: Performed by: INTERNAL MEDICINE

## 2025-01-10 PROCEDURE — 2500000003 HC RX 250 WO HCPCS

## 2025-01-10 PROCEDURE — 6360000002 HC RX W HCPCS: Performed by: INTERNAL MEDICINE

## 2025-01-10 PROCEDURE — 94761 N-INVAS EAR/PLS OXIMETRY MLT: CPT

## 2025-01-10 RX ADMIN — IPRATROPIUM BROMIDE AND ALBUTEROL SULFATE 1 DOSE: 2.5; .5 SOLUTION RESPIRATORY (INHALATION) at 20:03

## 2025-01-10 RX ADMIN — CEFAZOLIN 1000 MG: 1 INJECTION, POWDER, FOR SOLUTION INTRAMUSCULAR; INTRAVENOUS at 16:57

## 2025-01-10 RX ADMIN — HYDROCODONE BITARTRATE AND ACETAMINOPHEN 1 TABLET: 5; 325 TABLET ORAL at 22:56

## 2025-01-10 RX ADMIN — IPRATROPIUM BROMIDE AND ALBUTEROL SULFATE 1 DOSE: 2.5; .5 SOLUTION RESPIRATORY (INHALATION) at 12:31

## 2025-01-10 RX ADMIN — SODIUM CHLORIDE, PRESERVATIVE FREE 10 ML: 5 INJECTION INTRAVENOUS at 22:57

## 2025-01-10 RX ADMIN — POLYETHYLENE GLYCOL (3350) 17 G: 17 POWDER, FOR SOLUTION ORAL at 22:57

## 2025-01-10 RX ADMIN — HYDROCODONE BITARTRATE AND ACETAMINOPHEN 1 TABLET: 5; 325 TABLET ORAL at 11:42

## 2025-01-10 RX ADMIN — FOLIC ACID 1 MG: 1 TABLET ORAL at 11:41

## 2025-01-10 RX ADMIN — SULFAMETHOXAZOLE AND TRIMETHOPRIM 0.5 TABLET: 800; 160 TABLET ORAL at 11:41

## 2025-01-10 RX ADMIN — PREDNISONE 60 MG: 20 TABLET ORAL at 11:41

## 2025-01-10 RX ADMIN — EPOETIN ALFA-EPBX 10000 UNITS: 10000 INJECTION, SOLUTION INTRAVENOUS; SUBCUTANEOUS at 09:10

## 2025-01-10 RX ADMIN — PANTOPRAZOLE SODIUM 40 MG: 40 TABLET, DELAYED RELEASE ORAL at 05:23

## 2025-01-10 RX ADMIN — POLYETHYLENE GLYCOL (3350) 17 G: 17 POWDER, FOR SOLUTION ORAL at 11:43

## 2025-01-10 RX ADMIN — TRAZODONE HYDROCHLORIDE 25 MG: 50 TABLET ORAL at 22:55

## 2025-01-10 RX ADMIN — BUSPIRONE HYDROCHLORIDE 10 MG: 10 TABLET ORAL at 22:55

## 2025-01-10 RX ADMIN — ONDANSETRON 4 MG: 2 INJECTION INTRAMUSCULAR; INTRAVENOUS at 09:11

## 2025-01-10 ASSESSMENT — PAIN DESCRIPTION - LOCATION: LOCATION: HAND

## 2025-01-10 ASSESSMENT — PAIN DESCRIPTION - DIRECTION: RADIATING_TOWARDS: DENIES

## 2025-01-10 ASSESSMENT — PAIN SCALES - GENERAL
PAINLEVEL_OUTOF10: 3
PAINLEVEL_OUTOF10: 0
PAINLEVEL_OUTOF10: 8

## 2025-01-10 ASSESSMENT — PAIN DESCRIPTION - FREQUENCY: FREQUENCY: INTERMITTENT

## 2025-01-10 ASSESSMENT — PAIN DESCRIPTION - ORIENTATION: ORIENTATION: RIGHT

## 2025-01-10 ASSESSMENT — PAIN DESCRIPTION - PAIN TYPE: TYPE: CHRONIC PAIN

## 2025-01-10 ASSESSMENT — PAIN - FUNCTIONAL ASSESSMENT: PAIN_FUNCTIONAL_ASSESSMENT: ACTIVITIES ARE NOT PREVENTED

## 2025-01-10 ASSESSMENT — PAIN DESCRIPTION - ONSET: ONSET: ON-GOING

## 2025-01-10 ASSESSMENT — PAIN DESCRIPTION - DESCRIPTORS: DESCRIPTORS: ACHING;DISCOMFORT

## 2025-01-10 NOTE — PLAN OF CARE
Problem: Discharge Planning  Goal: Discharge to home or other facility with appropriate resources  Outcome: Progressing     Problem: Pain  Goal: Verbalizes/displays adequate comfort level or baseline comfort level  Outcome: Progressing  Flowsheets  Taken 1/9/2025 1927 by Alie Rubin, RN  Verbalizes/displays adequate comfort level or baseline comfort level:   Encourage patient to monitor pain and request assistance   Assess pain using appropriate pain scale  Taken 1/9/2025 1339 by Alie Rubin, RN  Verbalizes/displays adequate comfort level or baseline comfort level:   Encourage patient to monitor pain and request assistance   Assess pain using appropriate pain scale     Problem: Safety - Adult  Goal: Free from fall injury  Outcome: Progressing     Problem: ABCDS Injury Assessment  Goal: Absence of physical injury  Outcome: Progressing      none

## 2025-01-10 NOTE — FLOWSHEET NOTE
01/10/25 1130   Vital Signs   Temp 97.7 °F (36.5 °C)   Temp Source Oral   Pulse 66   Heart Rate Source Monitor   Respirations 16   /66   MAP (Calculated) 84   Patient Position Semi fowlers   Pain Assessment   Pain Assessment None - Denies Pain   Oxygen Therapy   SpO2 98 %   O2 Device Nasal cannula   O2 Flow Rate (L/min) 4 L/min     Shift assessment complete. See flow sheet. Scheduled meds given. See MAR.  Patients head-toe complete, VS are logged, and active bowel sound noted in all four quadrants.    Pt returned from dialysis. No s/s of distress. Alert and oriented denies SOB pt stable.     No further needs  noted at this time. Call light and bedside table are within reach. The bed is locked and is in the lowest position.        Daren Biswas RN

## 2025-01-11 PROCEDURE — 99233 SBSQ HOSP IP/OBS HIGH 50: CPT | Performed by: INTERNAL MEDICINE

## 2025-01-11 PROCEDURE — 2580000003 HC RX 258: Performed by: INTERNAL MEDICINE

## 2025-01-11 PROCEDURE — 94640 AIRWAY INHALATION TREATMENT: CPT

## 2025-01-11 PROCEDURE — 94761 N-INVAS EAR/PLS OXIMETRY MLT: CPT

## 2025-01-11 PROCEDURE — 2500000003 HC RX 250 WO HCPCS

## 2025-01-11 PROCEDURE — 6360000002 HC RX W HCPCS: Performed by: INTERNAL MEDICINE

## 2025-01-11 PROCEDURE — 6370000000 HC RX 637 (ALT 250 FOR IP): Performed by: INTERNAL MEDICINE

## 2025-01-11 PROCEDURE — 6370000000 HC RX 637 (ALT 250 FOR IP): Performed by: PHYSICIAN ASSISTANT

## 2025-01-11 PROCEDURE — 1200000000 HC SEMI PRIVATE

## 2025-01-11 PROCEDURE — 6370000000 HC RX 637 (ALT 250 FOR IP)

## 2025-01-11 PROCEDURE — 2700000000 HC OXYGEN THERAPY PER DAY

## 2025-01-11 RX ADMIN — FOLIC ACID 1 MG: 1 TABLET ORAL at 09:27

## 2025-01-11 RX ADMIN — IPRATROPIUM BROMIDE AND ALBUTEROL SULFATE 1 DOSE: 2.5; .5 SOLUTION RESPIRATORY (INHALATION) at 20:00

## 2025-01-11 RX ADMIN — BUSPIRONE HYDROCHLORIDE 10 MG: 10 TABLET ORAL at 09:27

## 2025-01-11 RX ADMIN — POLYETHYLENE GLYCOL (3350) 17 G: 17 POWDER, FOR SOLUTION ORAL at 23:34

## 2025-01-11 RX ADMIN — HYDROCODONE BITARTRATE AND ACETAMINOPHEN 1 TABLET: 5; 325 TABLET ORAL at 14:43

## 2025-01-11 RX ADMIN — SODIUM CHLORIDE, PRESERVATIVE FREE 10 ML: 5 INJECTION INTRAVENOUS at 23:33

## 2025-01-11 RX ADMIN — POLYETHYLENE GLYCOL (3350) 17 G: 17 POWDER, FOR SOLUTION ORAL at 09:27

## 2025-01-11 RX ADMIN — HYDROCODONE BITARTRATE AND ACETAMINOPHEN 1 TABLET: 5; 325 TABLET ORAL at 09:27

## 2025-01-11 RX ADMIN — TRAZODONE HYDROCHLORIDE 25 MG: 50 TABLET ORAL at 23:33

## 2025-01-11 RX ADMIN — BUSPIRONE HYDROCHLORIDE 10 MG: 10 TABLET ORAL at 14:43

## 2025-01-11 RX ADMIN — PANTOPRAZOLE SODIUM 40 MG: 40 TABLET, DELAYED RELEASE ORAL at 05:30

## 2025-01-11 RX ADMIN — PREDNISONE 60 MG: 20 TABLET ORAL at 09:27

## 2025-01-11 RX ADMIN — IPRATROPIUM BROMIDE AND ALBUTEROL SULFATE 1 DOSE: 2.5; .5 SOLUTION RESPIRATORY (INHALATION) at 08:05

## 2025-01-11 RX ADMIN — BUSPIRONE HYDROCHLORIDE 10 MG: 10 TABLET ORAL at 23:34

## 2025-01-11 RX ADMIN — CEFAZOLIN 1000 MG: 1 INJECTION, POWDER, FOR SOLUTION INTRAMUSCULAR; INTRAVENOUS at 17:20

## 2025-01-11 ASSESSMENT — PAIN SCALES - GENERAL
PAINLEVEL_OUTOF10: 8
PAINLEVEL_OUTOF10: 8

## 2025-01-11 ASSESSMENT — PAIN DESCRIPTION - LOCATION: LOCATION: FINGER (COMMENT WHICH ONE)

## 2025-01-11 ASSESSMENT — PAIN DESCRIPTION - ORIENTATION: ORIENTATION: RIGHT;LEFT

## 2025-01-11 NOTE — FLOWSHEET NOTE
01/10/25 2145 01/10/25 2255   Vital Signs   Temp 98.4 °F (36.9 °C)  --    Temp Source Axillary  --    Pulse 75  --    Heart Rate Source Monitor  --    Respirations 17  --    /75  --    MAP (Calculated) 94  --    BP Location Right upper arm  --    BP Method Automatic  --    Patient Position Semi fowlers  --    Pain Assessment   Pain Assessment  --  0-10   Pain Level  --  8   Patient's Stated Pain Goal  --  0 - No pain   Pain Location  --  Hand   Pain Orientation  --  Right   Pain Descriptors  --  Aching;Discomfort   Functional Pain Assessment  --  Activities are not prevented   Pain Type  --  Chronic pain   Pain Radiating Towards  --  denies   Pain Frequency  --  Intermittent   Pain Onset  --  On-going   Non-Pharmaceutical Pain Intervention(s)  --  Repositioned;Rest   Care Plan - Pain Goals   Verbalizes/displays adequate comfort level or baseline comfort level  --  Encourage patient to monitor pain and request assistance;Assess pain using appropriate pain scale   Opioid-Induced Sedation   POSS Score  --  1   RASS   [place] Agitation Sedation Scale (RASS)  --  0   Oxygen Therapy   SpO2 98 % 98 %   O2 Device Nasal cannula Nasal cannula   Skin Assessment  --  Clean, dry, & intact   O2 Flow Rate (L/min) 4 L/min 4 L/min     PM assessment completed. Alert and oriented x4 at this time.  With scheduled Norco tonight for pain. No signs or symptoms of distress noted. Patient tolerated PM medications well, crushed in pudding. Respirations easy and even. Bed in lowest position, bed alarm in place and functioning properly, bed rails x2 up,  Call light within reach.     Bedside Mobility Assessment Tool (BMAT):     Assessment Level 1- Sit and Shake    1. From a semi-reclined position, ask patient to sit up and rotate to a seated position at the side of the bed. Can use the bedrail.    2. Ask patient to reach out and grab your hand and shake making sure patient reaches across his/her midline.   Fail- Patient is unable to

## 2025-01-11 NOTE — PLAN OF CARE
Problem: Discharge Planning  Goal: Discharge to home or other facility with appropriate resources  1/11/2025 0107 by Gustavo Rendon RN  Outcome: Progressing  Flowsheets (Taken 1/10/2025 2300)  Discharge to home or other facility with appropriate resources: Identify barriers to discharge with patient and caregiver     Problem: Pain  Goal: Verbalizes/displays adequate comfort level or baseline comfort level  1/11/2025 0107 by Gustavo Rendon RN  Outcome: Progressing  Flowsheets (Taken 1/10/2025 2255)  Verbalizes/displays adequate comfort level or baseline comfort level:   Encourage patient to monitor pain and request assistance   Assess pain using appropriate pain scale     Problem: Safety - Adult  Goal: Free from fall injury  1/11/2025 0107 by Gustavo Rendon RN  Outcome: Progressing     Problem: ABCDS Injury Assessment  Goal: Absence of physical injury  1/11/2025 0107 by Gustavo Rendon RN  Outcome: Progressing     Problem: Skin/Tissue Integrity  Goal: Absence of new skin breakdown  Description: 1.  Monitor for areas of redness and/or skin breakdown  2.  Assess vascular access sites hourly  3.  Every 4-6 hours minimum:  Change oxygen saturation probe site  4.  Every 4-6 hours:  If on nasal continuous positive airway pressure, respiratory therapy assess nares and determine need for appliance change or resting period.  1/11/2025 0107 by Gustavo Rendon RN  Outcome: Progressing     Problem: Neurosensory - Adult  Goal: Achieves stable or improved neurological status  Outcome: Progressing  Flowsheets (Taken 1/10/2025 2300)  Achieves stable or improved neurological status: Assess for and report changes in neurological status     Problem: Respiratory - Adult  Goal: Achieves optimal ventilation and oxygenation  Outcome: Progressing  Flowsheets (Taken 1/10/2025 2300)  Achieves optimal ventilation and oxygenation:   Assess for changes in mentation and behavior   Assess for changes in respiratory status     Problem:

## 2025-01-11 NOTE — FLOWSHEET NOTE
01/11/25 0720   Handoff   Communication Given Shift Handoff   Handoff Given To Donna WOODS   Handoff Received From Brittney WOODS   Handoff Communication Face to Face;At bedside   Time Handoff Given 0720   End of Shift Check Performed Yes     Pt in bed with eyes closed.  No signs of distress noted.  Call light within reach.

## 2025-01-11 NOTE — FLOWSHEET NOTE
01/11/25 0915   Vital Signs   Temp 98.1 °F (36.7 °C)   Temp Source Oral   Pulse 80   Heart Rate Source Monitor   Respirations 18   /61   MAP (Calculated) 80   Patient Position Semi fowlers   Pain Assessment   Pain Assessment 0-10   Pain Level 8   Pain Location Finger (Comment which one)   Pain Orientation Right;Left   Oxygen Therapy   SpO2 97 %   O2 Device Nasal cannula   O2 Flow Rate (L/min) 4 L/min       Shift assessment complete. See flow sheet. Scheduled meds given. See MAR.  Patients head-toe complete, VS are logged, and active bowel sound noted in all four quadrants.    Pt sitting up in bed respirations easy and unlabored. No s/s of distress. Alert and oriented denies pain or SOB pt stable. Pt stated she did cough up more blood this AM MD notified.     No further needs  noted at this time. Call light and bedside table are within reach. The bed is locked and is in the lowest position.        Daren Biswas RN

## 2025-01-12 LAB
ALBUMIN SERPL-MCNC: 2.8 G/DL (ref 3.4–5)
ALBUMIN/GLOB SERPL: 1.2 {RATIO} (ref 1.1–2.2)
ALP SERPL-CCNC: 74 U/L (ref 40–129)
ALT SERPL-CCNC: <5 U/L (ref 10–40)
ANION GAP SERPL CALCULATED.3IONS-SCNC: 6 MMOL/L (ref 3–16)
ANISOCYTOSIS BLD QL SMEAR: ABNORMAL
AST SERPL-CCNC: 13 U/L (ref 15–37)
BASOPHILS # BLD: 0 K/UL (ref 0–0.2)
BASOPHILS NFR BLD: 0 %
BILIRUB SERPL-MCNC: 0.6 MG/DL (ref 0–1)
BUN SERPL-MCNC: 40 MG/DL (ref 7–20)
CALCIUM SERPL-MCNC: 7.9 MG/DL (ref 8.3–10.6)
CHLORIDE SERPL-SCNC: 92 MMOL/L (ref 99–110)
CO2 SERPL-SCNC: 28 MMOL/L (ref 21–32)
CREAT SERPL-MCNC: 2.4 MG/DL (ref 0.6–1.2)
DEPRECATED RDW RBC AUTO: 18.3 % (ref 12.4–15.4)
EOSINOPHIL # BLD: 0 K/UL (ref 0–0.6)
EOSINOPHIL NFR BLD: 0 %
GFR SERPLBLD CREATININE-BSD FMLA CKD-EPI: 21 ML/MIN/{1.73_M2}
GLUCOSE SERPL-MCNC: 81 MG/DL (ref 70–99)
HCT VFR BLD AUTO: 22 % (ref 36–48)
HGB BLD-MCNC: 7.4 G/DL (ref 12–16)
HYPOCHROMIA BLD QL SMEAR: ABNORMAL
LYMPHOCYTES # BLD: 0.1 K/UL (ref 1–5.1)
LYMPHOCYTES NFR BLD: 3 %
MCH RBC QN AUTO: 30.6 PG (ref 26–34)
MCHC RBC AUTO-ENTMCNC: 33.9 G/DL (ref 31–36)
MCV RBC AUTO: 90.2 FL (ref 80–100)
MONOCYTES # BLD: 0.1 K/UL (ref 0–1.3)
MONOCYTES NFR BLD: 3 %
NEUTROPHILS # BLD: 3.4 K/UL (ref 1.7–7.7)
NEUTROPHILS NFR BLD: 94 %
OVALOCYTES BLD QL SMEAR: ABNORMAL
PLATELET # BLD AUTO: 159 K/UL (ref 135–450)
PLATELET BLD QL SMEAR: ADEQUATE
PMV BLD AUTO: 7.5 FL (ref 5–10.5)
POTASSIUM SERPL-SCNC: 3.7 MMOL/L (ref 3.5–5.1)
PROT SERPL-MCNC: 5.1 G/DL (ref 6.4–8.2)
RBC # BLD AUTO: 2.44 M/UL (ref 4–5.2)
SLIDE REVIEW: ABNORMAL
SODIUM SERPL-SCNC: 126 MMOL/L (ref 136–145)
STOMATOCYTES BLD QL SMEAR: ABNORMAL
WBC # BLD AUTO: 3.6 K/UL (ref 4–11)

## 2025-01-12 PROCEDURE — 2700000000 HC OXYGEN THERAPY PER DAY

## 2025-01-12 PROCEDURE — 80053 COMPREHEN METABOLIC PANEL: CPT

## 2025-01-12 PROCEDURE — 6360000002 HC RX W HCPCS: Performed by: INTERNAL MEDICINE

## 2025-01-12 PROCEDURE — 94761 N-INVAS EAR/PLS OXIMETRY MLT: CPT

## 2025-01-12 PROCEDURE — 99221 1ST HOSP IP/OBS SF/LOW 40: CPT | Performed by: SURGERY

## 2025-01-12 PROCEDURE — 2580000003 HC RX 258: Performed by: INTERNAL MEDICINE

## 2025-01-12 PROCEDURE — 6370000000 HC RX 637 (ALT 250 FOR IP): Performed by: PHYSICIAN ASSISTANT

## 2025-01-12 PROCEDURE — 85025 COMPLETE CBC W/AUTO DIFF WBC: CPT

## 2025-01-12 PROCEDURE — 6370000000 HC RX 637 (ALT 250 FOR IP): Performed by: INTERNAL MEDICINE

## 2025-01-12 PROCEDURE — 97530 THERAPEUTIC ACTIVITIES: CPT

## 2025-01-12 PROCEDURE — 6370000000 HC RX 637 (ALT 250 FOR IP)

## 2025-01-12 PROCEDURE — 99233 SBSQ HOSP IP/OBS HIGH 50: CPT | Performed by: INTERNAL MEDICINE

## 2025-01-12 PROCEDURE — 94640 AIRWAY INHALATION TREATMENT: CPT

## 2025-01-12 PROCEDURE — 97535 SELF CARE MNGMENT TRAINING: CPT

## 2025-01-12 PROCEDURE — 2500000003 HC RX 250 WO HCPCS

## 2025-01-12 PROCEDURE — 1200000000 HC SEMI PRIVATE

## 2025-01-12 RX ADMIN — BUSPIRONE HYDROCHLORIDE 10 MG: 10 TABLET ORAL at 15:31

## 2025-01-12 RX ADMIN — BUSPIRONE HYDROCHLORIDE 10 MG: 10 TABLET ORAL at 09:59

## 2025-01-12 RX ADMIN — POLYETHYLENE GLYCOL (3350) 17 G: 17 POWDER, FOR SOLUTION ORAL at 23:45

## 2025-01-12 RX ADMIN — IPRATROPIUM BROMIDE AND ALBUTEROL SULFATE 1 DOSE: 2.5; .5 SOLUTION RESPIRATORY (INHALATION) at 09:08

## 2025-01-12 RX ADMIN — PREDNISONE 60 MG: 20 TABLET ORAL at 09:59

## 2025-01-12 RX ADMIN — TRAZODONE HYDROCHLORIDE 25 MG: 50 TABLET ORAL at 23:54

## 2025-01-12 RX ADMIN — CEFAZOLIN 1000 MG: 1 INJECTION, POWDER, FOR SOLUTION INTRAMUSCULAR; INTRAVENOUS at 15:36

## 2025-01-12 RX ADMIN — IPRATROPIUM BROMIDE AND ALBUTEROL SULFATE 1 DOSE: 2.5; .5 SOLUTION RESPIRATORY (INHALATION) at 20:35

## 2025-01-12 RX ADMIN — BUSPIRONE HYDROCHLORIDE 10 MG: 10 TABLET ORAL at 23:53

## 2025-01-12 RX ADMIN — SODIUM CHLORIDE, PRESERVATIVE FREE 10 ML: 5 INJECTION INTRAVENOUS at 23:53

## 2025-01-12 RX ADMIN — POLYETHYLENE GLYCOL (3350) 17 G: 17 POWDER, FOR SOLUTION ORAL at 09:59

## 2025-01-12 RX ADMIN — FOLIC ACID 1 MG: 1 TABLET ORAL at 09:59

## 2025-01-12 NOTE — CONSULTS
[address] [address]                              CONSULTATION      PATIENT NAME: JOSEPH PICHARDO                : 1955  MED REC NO: [id number]                      ROOM: 0230  ACCOUNT NO: [id number]                       ADMIT DATE: 2025  PROVIDER: Adán Duffy MD      CONSULT DATE: 2025    REASON FOR CONSULTATION:  End-stage renal disease management.    HISTORY OF PRESENT ILLNESS:  The patient is a 69-year-old  female patient who was recently diagnosed with end-stage renal disease secondary to ANCA vasculitis.  The patient was treated with rituximab on  and started on prednisone 60 mg daily along with Bactrim Double Strength half a tablet every Monday, Wednesday, and Friday.  She was discharged from the hospital while on hemodialysis per Monday, Wednesday, Friday schedule and was re-admitted with hematemesis.  Nephrology was consulted for further management of her dialysis needs.    PAST MEDICAL HISTORY:    1. Laryngeal cancer.  2. Chronic bronchitis.  3. Asthma.  4. Acute kidney injury.  5. End-stage renal disease.  6. ANCA vasculitis.    PAST SURGICAL HISTORY:    1. Kidney biopsy.  2. Bronchoscopy.  3.  section.  4. Tunneled dialysis catheter placement.  5. Tracheostomy.    ALLERGIES:  THE PATIENT IS ALLERGIC TO PERCOCET.      SOCIAL HISTORY:  The patient quit smoking and does not drink alcohol.    FAMILY HISTORY:  Negative for kidney disease.    REVIEW OF SYSTEMS:  The patient denies any fever, chills, cough, or expectorations.  Otherwise, a 10-point review of systems was relatively unremarkable.    PHYSICAL EXAMINATION:  VITAL SIGNS:  Blood pressure 125/73, heart rate 81, respirations 16, temperature 98.3 Fahrenheit.  The patient is saturating 100% on room air.  GENERAL APPEARANCE:  The patient is alert and oriented x3, not in acute distress.  EYES:  Normal conjunctivae, reactive 
PHARMACY TO DOSE CEFAZOLIN    Dx:  Blood infection (Staph aureus - NOT MRSA)    Hemodialysis Monday/Wednesday/Friday    Give Cefazolin 2 g IVPB now, then 1 g every 24 hours after dialysis on dialysis days.    MIL De La Rosa.Ph.1/8/202510:09 AM    
Patient seen and examined, consult note dictated.    Assessment and Plan:    1- ESRD: Newly diagnosed secondary to ANCA vasculitis currently on Prednisone 60 mg daily, Bactrim DS, and s/p Rituximab on 12/19; we will continue hemodialysis per [facility] schedule, and will administer another dose of Rituximab when stable.    2- No significant electrolytes disorders noted.    3- HTN: Blood pressure within acceptable range.    4- Anemia: Stable hemoglobin, resume MISA during dialysis.    5- Hematemesis: Management per Gastroenterology.  
Please see consult note written on 1/7/25.  
Reason for referral and CC: Hemoptysis    HISTORY OF PRESENT ILLNESS: 69-year-old female with a history of recently diagnosed ANCA positive vasculitis with kidney failure requiring dialysis as well as pulmonary consolidations with pleural effusion and hemoptysis.  She had hemoptysis during her last admission.  She also has subscapular hematoma around the kidney from the biopsy.  She had low hemoglobin last admission which required transfusion.  Her hemoglobin on admission was 6.7 after transfusion is back to her baseline.  She reports that she has had intermittent hemoptysis since her last hospitalization.  She did undergo bronchoscopy which ruled out DAH and there is not likely an opportunity for intervention other than treating the underlying disease.  Last admission nephrology started rituximab and prednisone 60 mg daily and Bactrim  and she has been maintained on this.    Past Medical History:   Diagnosis Date    Antineutrophilic cytoplasmic antibody (ANCA) vasculitis (HCC)     Asthma     Chronic bronchiolitis (HCC)     Chronic kidney disease     COPD (chronic obstructive pulmonary disease) (HCC)     Dysphagia     Hemodialysis patient (HCC)     Throat cancer (HCC)      Past Surgical History:   Procedure Laterality Date    BRONCHOSCOPY  2024    BRONCHOSCOPY N/A 2024    BRONCH NF performed by Liban Durand MD at [facility] ENDOSCOPY    BRONCHOSCOPY  2024    BRONCHOSCOPY BIOPSY BRONCHUS performed by Liban Durand MD at [facility] ENDOSCOPY     SECTION      CT BIOPSY RENAL  2024    CT BIOPSY RENAL 2024 [facility] CT SCAN    GASTROSTOMY      IR NONTUNNELED VASCULAR CATHETER > 5 YEARS  2024    IR NONTUNNELED VASCULAR CATHETER 2024 [facility] SPECIAL PROCEDURES    IR TUNNELED CVC PLACE WO SQ PORT/PUMP > 5 YEARS  2024    IR TUNNELED CVC PLACE WO SQ PORT/PUMP > 5 YEARS 2024 [facility] SPECIAL PROCEDURES    TRACHEOSTOMY  2024    TRACHEOSTOMY      UTERINE 
1,000 mg, IntraVENous, Q24H  polyethylene glycol (GLYCOLAX) packet 17 g, 17 g, Oral, BID  epoetin vickey-epbx (RETACRIT) injection 10,000 Units, 10,000 Units, IntraVENous, Once per day on Monday Wednesday Friday  sodium chloride flush 0.9 % injection 5-40 mL, 5-40 mL, IntraVENous, 2 times per day  sodium chloride flush 0.9 % injection 5-40 mL, 5-40 mL, IntraVENous, PRN  0.9 % sodium chloride infusion, , IntraVENous, PRN  ondansetron (ZOFRAN-ODT) disintegrating tablet 4 mg, 4 mg, Oral, Q8H PRN **OR** ondansetron (ZOFRAN) injection 4 mg, 4 mg, IntraVENous, Q6H PRN  albuterol sulfate HFA (PROVENTIL;VENTOLIN;PROAIR) 108 (90 Base) MCG/ACT inhaler 2 puff, 2 puff, Inhalation, Q4H PRN  benzonatate (TESSALON) capsule 100 mg, 100 mg, Oral, TID PRN  folic acid (FOLVITE) tablet 1 mg, 1 mg, Oral, Daily  sulfamethoxazole-trimethoprim (BACTRIM DS;SEPTRA DS) 800-160 MG per tablet 0.5 tablet, 0.5 tablet, Oral, Once per day on Monday Wednesday Friday  HYDROcodone-acetaminophen (NORCO) 5-325 MG per tablet 1 tablet, 1 tablet, Oral, TID  HYDROcodone-acetaminophen (NORCO) 5-325 MG per tablet 1 tablet, 1 tablet, Oral, Q12H PRN  traZODone (DESYREL) tablet 25 mg, 25 mg, Oral, Nightly  busPIRone (BUSPAR) tablet 10 mg, 10 mg, Oral, TID  midodrine (PROAMATINE) tablet 10 mg, 10 mg, Oral, Q8H PRN  ipratropium 0.5 mg-albuterol 2.5 mg (DUONEB) nebulizer solution 1 Dose, 1 Dose, Inhalation, BID RT  0.9 % sodium chloride infusion, , IntraVENous, PRN  Prior to Admission medications    Medication Sig Start Date End Date Taking? Authorizing Provider   bisacodyl (DULCOLAX) 10 MG suppository Place 1 suppository rectally as needed for Constipation   Yes Provider, MD Paco   HYDROcodone-acetaminophen (NORCO) 5-325 MG per tablet Take 1 tablet by mouth 3 times daily. For chronic pain. Max Daily Amount: 3 tablets   Yes ProviderPaco MD   HYDROcodone-acetaminophen (NORCO) 5-325 MG per tablet Take 1 tablet by mouth every 12 hours as needed for Pain. 
--   --  3.9   INR 1.11  --   --   --   --         Assessment:    Hospital Problems             Last Modified POA    * (Principal) Hematemesis 1/6/2025 Yes    H/O laryngeal cancer 1/6/2025 Yes    ANCA-associated vasculitis (HCC) 1/6/2025 Yes    Acute blood loss anemia 1/6/2025 Yes    HCAP (healthcare-associated pneumonia) 1/6/2025 Yes    Chronic hypoxic respiratory failure 1/6/2025 Yes     69-year-old female admitted with acute blood loss anemia.  Initially there was concern for hematemesis however the patient reports hemoptysis which has been witnessed by nursing staff overnight.  She has known ANCA associated vasculitis and prior laryngeal cancer stage IV.  Prior PEG tube with removal in November.  She denies abdominal pain.  She has not had a bowel movement in multiple days.  She is on IV PPI.  Pulmonology has been consulted.    Plan:  Will hold off upper endoscopy today due to reported hemoptysis as well as 4 L nasal cannula does not appear to be significant GI bleeding going on.   Pulmonology consulted  She is high risk for peptic ulcer disease but there is been no overt GI bleeding at this time.  Continue IV PPI twice daily  Enema for constipation if patient is willing has to be soapsuds due to renal failure  Avoid anticoagulation      Sukhjinder Naranjo MD    [facility]    [phone]. Also available via [employer]    Please note that some or all of this record was generated using voice recognition software. If there are any questions about the content of this document, please contact the author as some errors in translation may have occurred.

## 2025-01-12 NOTE — PLAN OF CARE
Problem: Discharge Planning  Goal: Discharge to home or other facility with appropriate resources  1/11/2025 2337 by Petr Gadrner RN  Outcome: Progressing  1/11/2025 1942 by Daren Biswas RN  Outcome: Progressing     Problem: Pain  Goal: Verbalizes/displays adequate comfort level or baseline comfort level  1/11/2025 2337 by Petr Gardner RN  Outcome: Progressing  1/11/2025 1942 by Daren Biswas RN  Outcome: Progressing     Problem: Safety - Adult  Goal: Free from fall injury  1/11/2025 2337 by Petr Gardner RN  Outcome: Progressing  1/11/2025 1942 by Daren Biswas RN  Outcome: Progressing     Problem: ABCDS Injury Assessment  Goal: Absence of physical injury  1/11/2025 2337 by Petr Gardner RN  Outcome: Progressing  1/11/2025 1942 by Daren Biswas RN  Outcome: Progressing     Problem: Skin/Tissue Integrity  Goal: Absence of new skin breakdown  Description: 1.  Monitor for areas of redness and/or skin breakdown  2.  Assess vascular access sites hourly  3.  Every 4-6 hours minimum:  Change oxygen saturation probe site  4.  Every 4-6 hours:  If on nasal continuous positive airway pressure, respiratory therapy assess nares and determine need for appliance change or resting period.  1/11/2025 2337 by Petr Gardner RN  Outcome: Progressing  1/11/2025 1942 by Daren Biswas RN  Outcome: Progressing     Problem: Neurosensory - Adult  Goal: Achieves stable or improved neurological status  1/11/2025 2337 by Petr Gardner RN  Outcome: Progressing  1/11/2025 1942 by Daren Biswas RN  Outcome: Progressing     Problem: Respiratory - Adult  Goal: Achieves optimal ventilation and oxygenation  1/11/2025 2337 by Petr Gardner RN  Outcome: Progressing  1/11/2025 1942 by Daren Biswas RN  Outcome: Progressing     Problem: Cardiovascular - Adult  Goal: Maintains optimal cardiac output and hemodynamic stability  1/11/2025 2337 by Petr Gardner RN  Outcome: Progressing  1/11/2025 1942

## 2025-01-12 NOTE — FLOWSHEET NOTE
01/12/25 0956   Vital Signs   Temp 97.6 °F (36.4 °C)   Temp Source Axillary   Pulse 82   Heart Rate Source Monitor   Respirations 16   /71   MAP (Calculated) 91   Patient Position Semi fowlers   Pain Assessment   Pain Assessment None - Denies Pain   Oxygen Therapy   SpO2 98 %   O2 Device Nasal cannula   O2 Flow Rate (L/min) 4 L/min       Shift assessment complete. See flow sheet. Scheduled meds given. See MAR.  Patients head-toe complete, VS are logged, and active bowel sound noted in all four quadrants.    Pt sitting up in bed respirations easy and unlabored. No s/s of distress. Alert and oriented denies pain or SOB pt stable.     No further needs  noted at this time. Call light and bedside table are within reach. The bed is locked and is in the lowest position.        Daren Biswas RN

## 2025-01-12 NOTE — FLOWSHEET NOTE
Shift assessment complete. See doc flow. Nightly medications given crushed in pudding see MAR. Patient refused scheduled Norco. Patient denies pain at this time. From [facility]. Pure-wick in place. Patient refused SCDs. No other needs noted at this time. Bed alarm in place. Call light and bedside table within easy reach.    01/11/25 2228   Vital Signs   Temp 97.7 °F (36.5 °C)   Temp Source Oral   Pulse 74   Heart Rate Source Monitor   Respirations 15   /77   MAP (Calculated) 91   BP Location Left upper arm   BP Method Automatic   Patient Position Semi fowlers   Oxygen Therapy   SpO2 99 %   O2 Device Nasal cannula   O2 Flow Rate (L/min) 4 L/min

## 2025-01-13 ENCOUNTER — APPOINTMENT (OUTPATIENT)
Dept: INTERVENTIONAL RADIOLOGY/VASCULAR | Age: 70
DRG: 314 | End: 2025-01-13
Payer: MEDICARE

## 2025-01-13 LAB
ALBUMIN SERPL-MCNC: 2.8 G/DL (ref 3.4–5)
ALBUMIN/GLOB SERPL: 1.2 {RATIO} (ref 1.1–2.2)
ALP SERPL-CCNC: 74 U/L (ref 40–129)
ALT SERPL-CCNC: <5 U/L (ref 10–40)
ANION GAP SERPL CALCULATED.3IONS-SCNC: 9 MMOL/L (ref 3–16)
AST SERPL-CCNC: 12 U/L (ref 15–37)
BACTERIA BLD CULT: NORMAL
BASOPHILS # BLD: 0 K/UL (ref 0–0.2)
BASOPHILS NFR BLD: 0.7 %
BILIRUB SERPL-MCNC: 0.6 MG/DL (ref 0–1)
BUN SERPL-MCNC: 46 MG/DL (ref 7–20)
CALCIUM SERPL-MCNC: 7.9 MG/DL (ref 8.3–10.6)
CHLORIDE SERPL-SCNC: 96 MMOL/L (ref 99–110)
CO2 SERPL-SCNC: 27 MMOL/L (ref 21–32)
CREAT SERPL-MCNC: 2.6 MG/DL (ref 0.6–1.2)
DEPRECATED RDW RBC AUTO: 18.1 % (ref 12.4–15.4)
EOSINOPHIL # BLD: 0 K/UL (ref 0–0.6)
EOSINOPHIL NFR BLD: 0.9 %
GFR SERPLBLD CREATININE-BSD FMLA CKD-EPI: 19 ML/MIN/{1.73_M2}
GLUCOSE BLD-MCNC: 75 MG/DL (ref 70–99)
GLUCOSE BLD-MCNC: 79 MG/DL (ref 70–99)
GLUCOSE SERPL-MCNC: 69 MG/DL (ref 70–99)
HBV SURFACE AG SERPL QL IA: NORMAL
HCT VFR BLD AUTO: 22.7 % (ref 36–48)
HGB BLD-MCNC: 7.6 G/DL (ref 12–16)
INR PPP: 1.04 (ref 0.85–1.15)
LYMPHOCYTES # BLD: 0.2 K/UL (ref 1–5.1)
LYMPHOCYTES NFR BLD: 6.2 %
MCH RBC QN AUTO: 30.6 PG (ref 26–34)
MCHC RBC AUTO-ENTMCNC: 33.3 G/DL (ref 31–36)
MCV RBC AUTO: 91.8 FL (ref 80–100)
MONOCYTES # BLD: 0.3 K/UL (ref 0–1.3)
MONOCYTES NFR BLD: 9.6 %
NEUTROPHILS # BLD: 2.9 K/UL (ref 1.7–7.7)
NEUTROPHILS NFR BLD: 82.6 %
PERFORMED ON: NORMAL
PERFORMED ON: NORMAL
PLATELET # BLD AUTO: 170 K/UL (ref 135–450)
PMV BLD AUTO: 7.1 FL (ref 5–10.5)
POTASSIUM SERPL-SCNC: 4.1 MMOL/L (ref 3.5–5.1)
PROT SERPL-MCNC: 5.1 G/DL (ref 6.4–8.2)
PROTHROMBIN TIME: 13.8 SEC (ref 11.9–14.9)
RBC # BLD AUTO: 2.48 M/UL (ref 4–5.2)
SODIUM SERPL-SCNC: 132 MMOL/L (ref 136–145)
WBC # BLD AUTO: 3.6 K/UL (ref 4–11)

## 2025-01-13 PROCEDURE — 87340 HEPATITIS B SURFACE AG IA: CPT

## 2025-01-13 PROCEDURE — 6370000000 HC RX 637 (ALT 250 FOR IP)

## 2025-01-13 PROCEDURE — 90935 HEMODIALYSIS ONE EVALUATION: CPT

## 2025-01-13 PROCEDURE — 99153 MOD SED SAME PHYS/QHP EA: CPT

## 2025-01-13 PROCEDURE — 6370000000 HC RX 637 (ALT 250 FOR IP): Performed by: PHYSICIAN ASSISTANT

## 2025-01-13 PROCEDURE — 6370000000 HC RX 637 (ALT 250 FOR IP): Performed by: INTERNAL MEDICINE

## 2025-01-13 PROCEDURE — 36415 COLL VENOUS BLD VENIPUNCTURE: CPT

## 2025-01-13 PROCEDURE — 99233 SBSQ HOSP IP/OBS HIGH 50: CPT | Performed by: INTERNAL MEDICINE

## 2025-01-13 PROCEDURE — 6360000002 HC RX W HCPCS: Performed by: INTERNAL MEDICINE

## 2025-01-13 PROCEDURE — 6360000002 HC RX W HCPCS: Performed by: RADIOLOGY

## 2025-01-13 PROCEDURE — 85025 COMPLETE CBC W/AUTO DIFF WBC: CPT

## 2025-01-13 PROCEDURE — 0JH63XZ INSERTION OF TUNNELED VASCULAR ACCESS DEVICE INTO CHEST SUBCUTANEOUS TISSUE AND FASCIA, PERCUTANEOUS APPROACH: ICD-10-PCS | Performed by: SURGERY

## 2025-01-13 PROCEDURE — 6360000002 HC RX W HCPCS

## 2025-01-13 PROCEDURE — C1769 GUIDE WIRE: HCPCS

## 2025-01-13 PROCEDURE — 76937 US GUIDE VASCULAR ACCESS: CPT

## 2025-01-13 PROCEDURE — 2500000003 HC RX 250 WO HCPCS

## 2025-01-13 PROCEDURE — 36589 REMOVAL TUNNELED CV CATH: CPT

## 2025-01-13 PROCEDURE — 85610 PROTHROMBIN TIME: CPT

## 2025-01-13 PROCEDURE — 80053 COMPREHEN METABOLIC PANEL: CPT

## 2025-01-13 PROCEDURE — 77001 FLUOROGUIDE FOR VEIN DEVICE: CPT

## 2025-01-13 PROCEDURE — 94761 N-INVAS EAR/PLS OXIMETRY MLT: CPT

## 2025-01-13 PROCEDURE — 1200000000 HC SEMI PRIVATE

## 2025-01-13 PROCEDURE — 94640 AIRWAY INHALATION TREATMENT: CPT

## 2025-01-13 PROCEDURE — 2580000003 HC RX 258: Performed by: INTERNAL MEDICINE

## 2025-01-13 PROCEDURE — 36558 INSERT TUNNELED CV CATH: CPT

## 2025-01-13 PROCEDURE — 2700000000 HC OXYGEN THERAPY PER DAY

## 2025-01-13 PROCEDURE — 99152 MOD SED SAME PHYS/QHP 5/>YRS: CPT

## 2025-01-13 PROCEDURE — 0JPT3XZ REMOVAL OF TUNNELED VASCULAR ACCESS DEVICE FROM TRUNK SUBCUTANEOUS TISSUE AND FASCIA, PERCUTANEOUS APPROACH: ICD-10-PCS | Performed by: SURGERY

## 2025-01-13 RX ORDER — HYDROCODONE BITARTRATE AND ACETAMINOPHEN 5; 325 MG/1; MG/1
1 TABLET ORAL EVERY 6 HOURS PRN
Status: DISCONTINUED | OUTPATIENT
Start: 2025-01-13 | End: 2025-01-16 | Stop reason: HOSPADM

## 2025-01-13 RX ORDER — MIDAZOLAM HYDROCHLORIDE 5 MG/ML
INJECTION, SOLUTION INTRAMUSCULAR; INTRAVENOUS PRN
Status: COMPLETED | OUTPATIENT
Start: 2025-01-13 | End: 2025-01-13

## 2025-01-13 RX ORDER — TRAZODONE HYDROCHLORIDE 50 MG/1
25 TABLET, FILM COATED ORAL NIGHTLY PRN
Status: DISCONTINUED | OUTPATIENT
Start: 2025-01-13 | End: 2025-01-16 | Stop reason: HOSPADM

## 2025-01-13 RX ORDER — HEPARIN SODIUM 1000 [USP'U]/ML
3600 INJECTION, SOLUTION INTRAVENOUS; SUBCUTANEOUS PRN
Status: DISCONTINUED | OUTPATIENT
Start: 2025-01-13 | End: 2025-01-16 | Stop reason: HOSPADM

## 2025-01-13 RX ORDER — BUSPIRONE HYDROCHLORIDE 5 MG/1
5 TABLET ORAL 3 TIMES DAILY
Status: DISCONTINUED | OUTPATIENT
Start: 2025-01-13 | End: 2025-01-16 | Stop reason: HOSPADM

## 2025-01-13 RX ORDER — HEPARIN SODIUM 1000 [USP'U]/ML
INJECTION, SOLUTION INTRAVENOUS; SUBCUTANEOUS
Status: DISCONTINUED
Start: 2025-01-13 | End: 2025-01-14

## 2025-01-13 RX ORDER — DIPHENHYDRAMINE HYDROCHLORIDE 50 MG/ML
INJECTION INTRAMUSCULAR; INTRAVENOUS PRN
Status: COMPLETED | OUTPATIENT
Start: 2025-01-13 | End: 2025-01-13

## 2025-01-13 RX ORDER — FENTANYL CITRATE 50 UG/ML
INJECTION, SOLUTION INTRAMUSCULAR; INTRAVENOUS PRN
Status: COMPLETED | OUTPATIENT
Start: 2025-01-13 | End: 2025-01-13

## 2025-01-13 RX ADMIN — MIDAZOLAM HYDROCHLORIDE 0.5 MG: 5 INJECTION, SOLUTION INTRAMUSCULAR; INTRAVENOUS at 10:35

## 2025-01-13 RX ADMIN — Medication: at 03:39

## 2025-01-13 RX ADMIN — FENTANYL CITRATE 25 MCG: 50 INJECTION INTRAMUSCULAR; INTRAVENOUS at 10:19

## 2025-01-13 RX ADMIN — MIDAZOLAM HYDROCHLORIDE 0.5 MG: 5 INJECTION, SOLUTION INTRAMUSCULAR; INTRAVENOUS at 10:23

## 2025-01-13 RX ADMIN — MIDAZOLAM HYDROCHLORIDE 0.5 MG: 5 INJECTION, SOLUTION INTRAMUSCULAR; INTRAVENOUS at 10:19

## 2025-01-13 RX ADMIN — SULFAMETHOXAZOLE AND TRIMETHOPRIM 0.5 TABLET: 800; 160 TABLET ORAL at 18:04

## 2025-01-13 RX ADMIN — MIDAZOLAM HYDROCHLORIDE 0.5 MG: 5 INJECTION, SOLUTION INTRAMUSCULAR; INTRAVENOUS at 10:26

## 2025-01-13 RX ADMIN — FOLIC ACID 1 MG: 1 TABLET ORAL at 18:02

## 2025-01-13 RX ADMIN — FENTANYL CITRATE 25 MCG: 50 INJECTION INTRAMUSCULAR; INTRAVENOUS at 10:26

## 2025-01-13 RX ADMIN — DIPHENHYDRAMINE HYDROCHLORIDE 12.5 MG: 50 INJECTION, SOLUTION INTRAMUSCULAR; INTRAVENOUS at 10:42

## 2025-01-13 RX ADMIN — FENTANYL CITRATE 25 MCG: 50 INJECTION INTRAMUSCULAR; INTRAVENOUS at 10:35

## 2025-01-13 RX ADMIN — BUSPIRONE HYDROCHLORIDE 5 MG: 5 TABLET ORAL at 20:52

## 2025-01-13 RX ADMIN — HYDROCODONE BITARTRATE AND ACETAMINOPHEN 1 TABLET: 5; 325 TABLET ORAL at 18:05

## 2025-01-13 RX ADMIN — EPOETIN ALFA-EPBX 10000 UNITS: 10000 INJECTION, SOLUTION INTRAVENOUS; SUBCUTANEOUS at 14:00

## 2025-01-13 RX ADMIN — IPRATROPIUM BROMIDE AND ALBUTEROL SULFATE 1 DOSE: 2.5; .5 SOLUTION RESPIRATORY (INHALATION) at 20:39

## 2025-01-13 RX ADMIN — FENTANYL CITRATE 25 MCG: 50 INJECTION INTRAMUSCULAR; INTRAVENOUS at 10:23

## 2025-01-13 RX ADMIN — SODIUM CHLORIDE, PRESERVATIVE FREE 10 ML: 5 INJECTION INTRAVENOUS at 20:52

## 2025-01-13 RX ADMIN — CEFAZOLIN 1000 MG: 1 INJECTION, POWDER, FOR SOLUTION INTRAMUSCULAR; INTRAVENOUS at 18:03

## 2025-01-13 RX ADMIN — POLYETHYLENE GLYCOL (3350) 17 G: 17 POWDER, FOR SOLUTION ORAL at 20:52

## 2025-01-13 ASSESSMENT — PAIN SCALES - GENERAL
PAINLEVEL_OUTOF10: 0
PAINLEVEL_OUTOF10: 7

## 2025-01-13 NOTE — BRIEF OP NOTE
Brief Postoperative Note      Patient: Emily Alegre  YOB: 1955  MRN: [id number]    Date of Procedure: 1/13/2025    Infected Port/Bacteremia       1. Port and Tunneled Dialysis Cathter Removal.  2. Tunneled Dialysis Catheter Placement via New Access Site    Thiago France MD    Anesthesia: Moderate Sedation    Estimated Blood Loss (mL): Minimal    Complications: None    Implants:  Right IJ 14.5 fr x 23 cm TDC    Findings:  Successful right chest port and tunneled dialysis catheter removal.  Successful new access Right IJ Tunneled Dialysis Cathter Placement. Ok to use immediately.      Electronically signed by Thiago France MD on 1/13/2025 at 11:35 AM

## 2025-01-13 NOTE — FLOWSHEET NOTE
01/13/25 1645   Vital Signs   Temp 97.5 °F (36.4 °C)   Temp Source Axillary   Pulse 68   Heart Rate Source Monitor   Respirations 18   /71   MAP (Calculated) 90   Patient Position Semi fowlers   Pain Assessment   Pain Assessment None - Denies Pain   Oxygen Therapy   SpO2 100 %   Pulse Oximetry Type Continuous   O2 Device Nasal cannula   Rhythm Interpretation   Cardiac Rhythm Sinus rhythm     Returned from Dialysis.  Dressing over throat and s/p port site.   New tunneled cath RIJ in place.  Patient alert and oriented. Helped her wash face, gave her a drink of water and belongings. Call light in reach.     Gave her a bed bath, changed linens, Replaced mepilex on coccyx. Perineum red applied Zinc. Repositioned for comfort and propped legs up on pillows to offload heals.

## 2025-01-13 NOTE — OR NURSING
Pt arrived for image guided tunneled dialysis catheter insertion right . Procedure explained including the risk and benefits of the procedure. All questions answered. Pt verbalizes understanding of the procedure and states no more questions. Consent confirmed. Vital signs stable. Labs, allergies, medications, and code status reviewed. No contraindications noted.     Vital Signs  Vitals:    01/13/25 0816   BP: 135/83   Pulse: 70   Resp: 16   Temp: 97.5 °F (36.4 °C)   SpO2: 100%    (vital signs in table format)    Pre Jhon Score  2 - Able to move 4 extremities voluntarily on command  2 - BP+/- 20mmHg of normal  2 - Fully awake  1 - Needs oxygen to maintain oxygen saturation >90%  2 - Able to breathe deeply and cough freely    Allergies  Percocet [oxycodone-acetaminophen] and Chocolate (allergies)    Labs  Lab Results   Component Value Date    INR 1.04 01/13/2025    PROTIME 13.8 01/13/2025     Lab Results   Component Value Date    CREATININE 2.6 (H) 01/13/2025    BUN 46 (H) 01/13/2025     (L) 01/13/2025    K 4.1 01/13/2025    CL 96 (L) 01/13/2025    CO2 27 01/13/2025     Lab Results   Component Value Date    WBC 3.6 (L) 01/13/2025    HGB 7.6 (L) 01/13/2025    HCT 22.7 (L) 01/13/2025    MCV 91.8 01/13/2025     01/13/2025

## 2025-01-13 NOTE — PLAN OF CARE
Problem: Discharge Planning  Goal: Discharge to home or other facility with appropriate resources  1/13/2025 0500 by Petr Gardner RN  Outcome: Progressing  1/12/2025 1857 by Daren Biswas RN  Outcome: Progressing     Problem: Pain  Goal: Verbalizes/displays adequate comfort level or baseline comfort level  1/13/2025 0500 by Petr Gardner RN  Outcome: Progressing  1/12/2025 1857 by Daren Biswas RN  Outcome: Progressing     Problem: Safety - Adult  Goal: Free from fall injury  1/13/2025 0500 by Petr Gardner RN  Outcome: Progressing  1/12/2025 1857 by Daren Biswas RN  Outcome: Progressing     Problem: ABCDS Injury Assessment  Goal: Absence of physical injury  1/13/2025 0500 by Petr Gardner RN  Outcome: Progressing  1/12/2025 1857 by Daren Biswas RN  Outcome: Progressing     Problem: Skin/Tissue Integrity  Goal: Absence of new skin breakdown  Description: 1.  Monitor for areas of redness and/or skin breakdown  2.  Assess vascular access sites hourly  3.  Every 4-6 hours minimum:  Change oxygen saturation probe site  4.  Every 4-6 hours:  If on nasal continuous positive airway pressure, respiratory therapy assess nares and determine need for appliance change or resting period.  1/13/2025 0500 by Petr Gardner RN  Outcome: Progressing  1/12/2025 1857 by Daren Biswas RN  Outcome: Progressing     Problem: Neurosensory - Adult  Goal: Achieves stable or improved neurological status  1/13/2025 0500 by Petr Gardner RN  Outcome: Progressing  1/12/2025 1857 by Daren Biswas RN  Outcome: Progressing     Problem: Respiratory - Adult  Goal: Achieves optimal ventilation and oxygenation  1/13/2025 0500 by Petr Gardner RN  Outcome: Progressing  1/12/2025 1857 by Daren Biswas RN  Outcome: Progressing     Problem: Cardiovascular - Adult  Goal: Maintains optimal cardiac output and hemodynamic stability  1/13/2025 0500 by Petr Gardner RN  Outcome: Progressing  1/12/2025 1857

## 2025-01-13 NOTE — PRE SEDATION
Oral TID    ipratropium 0.5 mg-albuterol 2.5 mg  1 Dose Inhalation BID RT     Continuous Infusions:    sodium chloride      sodium chloride       PRN Meds: sodium chloride flush, sodium chloride, ondansetron **OR** ondansetron, albuterol sulfate HFA, benzonatate, HYDROcodone-acetaminophen, midodrine, sodium chloride  Home Meds:   Prior to Admission medications    Medication Sig Start Date End Date Taking? Authorizing Provider   bisacodyl (DULCOLAX) 10 MG suppository Place 1 suppository rectally as needed for Constipation   Yes Paco Garcia MD   HYDROcodone-acetaminophen (NORCO) 5-325 MG per tablet Take 1 tablet by mouth 3 times daily. For chronic pain. Max Daily Amount: 3 tablets   Yes Paco Garcia MD   HYDROcodone-acetaminophen (NORCO) 5-325 MG per tablet Take 1 tablet by mouth every 12 hours as needed for Pain. Max Daily Amount: 2 tablets   Yes Paco Garcia MD   polyethylene glycol (GLYCOLAX) 17 g packet Take 1 packet by mouth daily as needed for Constipation   Yes Paco Garcia MD   traZODone (DESYREL) 50 MG tablet Take 0.5 tablets by mouth nightly   Yes Paco Garcia MD   midodrine (PROAMATINE) 10 MG tablet Take 1 tablet by mouth 3 times daily (with meals)  Patient taking differently: Take 1 tablet by mouth every 8 hours as needed (SBP < 100) 12/24/24   Jonathan Gould MD   folic acid (FOLVITE) 1 MG tablet Take 1 tablet by mouth daily 12/25/24   Jonathan Gould MD   busPIRone (BUSPAR) 10 MG tablet Take 1 tablet by mouth 2 times daily  Patient taking differently: Take 1 tablet by mouth 3 times daily 12/24/24   Jonathan Gould MD   ipratropium 0.5 mg-albuterol 2.5 mg (DUONEB) 0.5-2.5 (3) MG/3ML SOLN nebulizer solution Inhale 3 mLs into the lungs three times daily 12/24/24   Jonathan Gould MD   lidocaine 4 % external patch Place 1 patch onto the skin daily 12/25/24   Jonathan Gould MD   predniSONE (DELTASONE) 20 MG tablet Take

## 2025-01-13 NOTE — FLOWSHEET NOTE
01/13/25 0816   Vital Signs   Temp 97.5 °F (36.4 °C)   Temp Source Oral   Pulse 70   Heart Rate Source Monitor   Respirations 16   /83   MAP (Calculated) 100   Pain Assessment   Pain Assessment None - Denies Pain   Oxygen Therapy   SpO2 100 %   O2 Device Nasal cannula   O2 Flow Rate (L/min) 4 L/min   Rhythm Interpretation   Cardiac Rhythm Sinus rhythm       Lab Results   Component Value Date/Time    WBC 3.6 (L) 01/12/2025 10:42 AM    HGB 7.4 (L) 01/12/2025 10:42 AM    HCT 22.0 (L) 01/12/2025 10:42 AM     01/12/2025 10:42 AM     (L) 01/12/2025 10:42 AM    K 3.7 01/12/2025 10:42 AM    BUN 40 (H) 01/12/2025 10:42 AM    CREATININE 2.4 (H) 01/12/2025 10:42 AM    MG 1.77 (L) 12/14/2024 04:58 AM        AM Assessment completed.  Patient in bed.  Awake, Alert and oriented. Respirations easy unlabored.On 4L oxygen - baseline.   Pain/Discomfort is being managed with scheduled and PRN analgesics per MD orders (See MAR). Patient is able to express and rate pain using numerical scale.  Plan of care, education and safety measures reviewed and mutually agreed upon with the patient.   Calls appropriately. To [facility] for Dialysis Cath Exchange.

## 2025-01-13 NOTE — DIALYSIS
Treatment time: 3 hours  Net UF: 1050  ml     Pre weight: 65.5 kg  Bed weight  Post weight: 64.5kg  EDW: TBD kg    Crit Line Used: Yes Ending Profile: A  Refill Present: NO refill HCT #1 26.5 minus  HCT#2 26.5 equals 0, max %BV tolerated 13,9     Access used: R IJ Vascath    Access function: Well with  ml/min     Medications or blood products given: Epogen 10,000 units, heparin dwells to line.     Regular outpatient schedule: [facility] prior to admission     Summary of response to treatment: Patient tolerated treatment well with stable VS throughout tx.  Remains drowsy but arouses easily and is appropriate but forgetful.     Report given to Kandice Espinosa RN and copy of dialysis treatment record placed in chart, to be scanned into EMR.

## 2025-01-13 NOTE — FLOWSHEET NOTE
Shift assessment complete. See doc flow. Nightly medications given crushed in pudding see MAR. Pt refused scheduled Norco, denies pain. From [facility]. Confused at times. Patient repositioned with pillow support. Jolene-area cleaned, small BM present, new pure-wick placed. NPO at midnight. Port-a-cath removal in the morning. Pt continues to refuse SCDs and air boots, feet and bilat heels elevated with pillow support. No other needs noted at this time. Bed alarm in place. Call light and bedside table within easy reach.    01/12/25 2340   Vital Signs   Temp 97.8 °F (36.6 °C)   Temp Source Axillary   Pulse 78   Heart Rate Source Monitor   Respirations 16   /68   MAP (Calculated) 90   BP Location Left upper arm   BP Method Automatic   Patient Position Semi fowlers   Oxygen Therapy   SpO2 97 %   O2 Device Nasal cannula   O2 Flow Rate (L/min) 4 L/min

## 2025-01-13 NOTE — CARE COORDINATION
Chart reviewed. Plan for pt to return to [facility] skilled with LTC placement. Will need precert. Will follow.

## 2025-01-13 NOTE — OR NURSING
Medications  Versed: 2 mg  Fentanyl: 100 mcg  Benadryl:12.5    Vital Signs  Vitals:    01/13/25 1047   BP: 122/67   Pulse: 68   Resp: 17   Temp:    SpO2: 100%    (vital signs in table format)    Post Jhon  2 - Able to move 4 extremities voluntarily on command  2 - BP+/- 20mmHg of normal  2 - Fully awake  1 - Needs oxygen to maintain oxygen saturation >90%  2 - Able to breathe deeply and cough freely

## 2025-01-13 NOTE — OR NURSING
Image guided tunneled dialysis catheter insertion right IJ completed. Dr. France placed 14.5 Taj[place] 23 cm [employer] tunneled dialysis catheter LOT # [id number] EXP 4/30/26 in the right IJ. 1.8 HEPARIN PER LUMEN Drain/tube dressing clean, dry, and intact. Pt tolerated procedure without any signs or symptoms of distress. Vital signs stable. Report called to 230 RN. Pt transported back to 230 in stable condition via bed by transport.       Vital Signs  Vitals:    01/13/25 1041   BP: 126/66   Pulse: 68   Resp: 17   Temp:    SpO2: 100%    (vital signs in table format)    Post Jhon  2 - Able to move 4 extremities voluntarily on command  2 - BP+/- 20mmHg of normal  2 - Fully awake  1 - Needs oxygen to maintain oxygen saturation >90%  2 - Able to breathe deeply and cough freely

## 2025-01-14 LAB
ACID FAST STN SPEC QL: NORMAL
ACID FAST STN SPEC QL: NORMAL
ALBUMIN SERPL-MCNC: 2.7 G/DL (ref 3.4–5)
ALBUMIN/GLOB SERPL: 1.1 {RATIO} (ref 1.1–2.2)
ALP SERPL-CCNC: 75 U/L (ref 40–129)
ALT SERPL-CCNC: <5 U/L (ref 10–40)
ANION GAP SERPL CALCULATED.3IONS-SCNC: 7 MMOL/L (ref 3–16)
AST SERPL-CCNC: 12 U/L (ref 15–37)
BASOPHILS # BLD: 0 K/UL (ref 0–0.2)
BASOPHILS NFR BLD: 0.2 %
BILIRUB SERPL-MCNC: 0.6 MG/DL (ref 0–1)
BUN SERPL-MCNC: 22 MG/DL (ref 7–20)
CALCIUM SERPL-MCNC: 7.6 MG/DL (ref 8.3–10.6)
CHLORIDE SERPL-SCNC: 96 MMOL/L (ref 99–110)
CO2 SERPL-SCNC: 29 MMOL/L (ref 21–32)
CREAT SERPL-MCNC: 1.6 MG/DL (ref 0.6–1.2)
DEPRECATED RDW RBC AUTO: 18.4 % (ref 12.4–15.4)
EOSINOPHIL # BLD: 0 K/UL (ref 0–0.6)
EOSINOPHIL NFR BLD: 1 %
GFR SERPLBLD CREATININE-BSD FMLA CKD-EPI: 35 ML/MIN/{1.73_M2}
GLUCOSE BLD-MCNC: 73 MG/DL (ref 70–99)
GLUCOSE SERPL-MCNC: 63 MG/DL (ref 70–99)
HCT VFR BLD AUTO: 23 % (ref 36–48)
HGB BLD-MCNC: 7.7 G/DL (ref 12–16)
LYMPHOCYTES # BLD: 0.2 K/UL (ref 1–5.1)
LYMPHOCYTES NFR BLD: 4.9 %
MCH RBC QN AUTO: 30.7 PG (ref 26–34)
MCHC RBC AUTO-ENTMCNC: 33.7 G/DL (ref 31–36)
MCV RBC AUTO: 91.2 FL (ref 80–100)
MONOCYTES # BLD: 0.3 K/UL (ref 0–1.3)
MONOCYTES NFR BLD: 7.3 %
MYCOBACTERIUM SPEC CULT: NORMAL
MYCOBACTERIUM SPEC CULT: NORMAL
NEUTROPHILS # BLD: 3 K/UL (ref 1.7–7.7)
NEUTROPHILS NFR BLD: 86.6 %
PERFORMED ON: NORMAL
PLATELET # BLD AUTO: 154 K/UL (ref 135–450)
PMV BLD AUTO: 7.4 FL (ref 5–10.5)
POTASSIUM SERPL-SCNC: 3.7 MMOL/L (ref 3.5–5.1)
PROT SERPL-MCNC: 5.1 G/DL (ref 6.4–8.2)
RBC # BLD AUTO: 2.52 M/UL (ref 4–5.2)
SODIUM SERPL-SCNC: 132 MMOL/L (ref 136–145)
WBC # BLD AUTO: 3.5 K/UL (ref 4–11)

## 2025-01-14 PROCEDURE — 6360000002 HC RX W HCPCS: Performed by: INTERNAL MEDICINE

## 2025-01-14 PROCEDURE — 99233 SBSQ HOSP IP/OBS HIGH 50: CPT | Performed by: INTERNAL MEDICINE

## 2025-01-14 PROCEDURE — 97530 THERAPEUTIC ACTIVITIES: CPT

## 2025-01-14 PROCEDURE — 92526 ORAL FUNCTION THERAPY: CPT

## 2025-01-14 PROCEDURE — 85025 COMPLETE CBC W/AUTO DIFF WBC: CPT

## 2025-01-14 PROCEDURE — 6370000000 HC RX 637 (ALT 250 FOR IP): Performed by: INTERNAL MEDICINE

## 2025-01-14 PROCEDURE — 6370000000 HC RX 637 (ALT 250 FOR IP): Performed by: PHYSICIAN ASSISTANT

## 2025-01-14 PROCEDURE — 2500000003 HC RX 250 WO HCPCS: Performed by: STUDENT IN AN ORGANIZED HEALTH CARE EDUCATION/TRAINING PROGRAM

## 2025-01-14 PROCEDURE — 2700000000 HC OXYGEN THERAPY PER DAY

## 2025-01-14 PROCEDURE — 94669 MECHANICAL CHEST WALL OSCILL: CPT

## 2025-01-14 PROCEDURE — 80053 COMPREHEN METABOLIC PANEL: CPT

## 2025-01-14 PROCEDURE — 94640 AIRWAY INHALATION TREATMENT: CPT

## 2025-01-14 PROCEDURE — 6370000000 HC RX 637 (ALT 250 FOR IP)

## 2025-01-14 PROCEDURE — 1200000000 HC SEMI PRIVATE

## 2025-01-14 PROCEDURE — 2580000003 HC RX 258: Performed by: INTERNAL MEDICINE

## 2025-01-14 PROCEDURE — 2500000003 HC RX 250 WO HCPCS

## 2025-01-14 PROCEDURE — 94761 N-INVAS EAR/PLS OXIMETRY MLT: CPT

## 2025-01-14 PROCEDURE — 36415 COLL VENOUS BLD VENIPUNCTURE: CPT

## 2025-01-14 RX ORDER — HEPARIN SODIUM 5000 [USP'U]/ML
5000 INJECTION, SOLUTION INTRAVENOUS; SUBCUTANEOUS EVERY 8 HOURS SCHEDULED
Status: DISCONTINUED | OUTPATIENT
Start: 2025-01-14 | End: 2025-01-16 | Stop reason: HOSPADM

## 2025-01-14 RX ORDER — BISACODYL 10 MG
10 SUPPOSITORY, RECTAL RECTAL ONCE
Status: COMPLETED | OUTPATIENT
Start: 2025-01-14 | End: 2025-01-14

## 2025-01-14 RX ORDER — GUAIFENESIN 200 MG/10ML
400 LIQUID ORAL EVERY 4 HOURS PRN
Status: DISCONTINUED | OUTPATIENT
Start: 2025-01-14 | End: 2025-01-16 | Stop reason: HOSPADM

## 2025-01-14 RX ADMIN — FOLIC ACID 1 MG: 1 TABLET ORAL at 08:47

## 2025-01-14 RX ADMIN — CEFAZOLIN 1000 MG: 1 INJECTION, POWDER, FOR SOLUTION INTRAMUSCULAR; INTRAVENOUS at 15:43

## 2025-01-14 RX ADMIN — HEPARIN SODIUM 5000 UNITS: 5000 INJECTION INTRAVENOUS; SUBCUTANEOUS at 15:43

## 2025-01-14 RX ADMIN — BUSPIRONE HYDROCHLORIDE 5 MG: 5 TABLET ORAL at 15:42

## 2025-01-14 RX ADMIN — HYDROCODONE BITARTRATE AND ACETAMINOPHEN 1 TABLET: 5; 325 TABLET ORAL at 08:57

## 2025-01-14 RX ADMIN — HYDROCODONE BITARTRATE AND ACETAMINOPHEN 1 TABLET: 5; 325 TABLET ORAL at 03:02

## 2025-01-14 RX ADMIN — IPRATROPIUM BROMIDE AND ALBUTEROL SULFATE 1 DOSE: 2.5; .5 SOLUTION RESPIRATORY (INHALATION) at 19:08

## 2025-01-14 RX ADMIN — PREDNISONE 50 MG: 20 TABLET ORAL at 08:47

## 2025-01-14 RX ADMIN — IPRATROPIUM BROMIDE AND ALBUTEROL SULFATE 1 DOSE: 2.5; .5 SOLUTION RESPIRATORY (INHALATION) at 08:15

## 2025-01-14 RX ADMIN — GUAIFENESIN 400 MG: 100 SOLUTION ORAL at 20:08

## 2025-01-14 RX ADMIN — BUSPIRONE HYDROCHLORIDE 5 MG: 5 TABLET ORAL at 08:47

## 2025-01-14 RX ADMIN — POLYETHYLENE GLYCOL (3350) 17 G: 17 POWDER, FOR SOLUTION ORAL at 08:47

## 2025-01-14 RX ADMIN — BISACODYL 10 MG: 10 SUPPOSITORY RECTAL at 15:42

## 2025-01-14 RX ADMIN — SODIUM CHLORIDE, PRESERVATIVE FREE 10 ML: 5 INJECTION INTRAVENOUS at 08:47

## 2025-01-14 RX ADMIN — HYDROCODONE BITARTRATE AND ACETAMINOPHEN 1 TABLET: 5; 325 TABLET ORAL at 20:13

## 2025-01-14 RX ADMIN — SODIUM CHLORIDE, PRESERVATIVE FREE 10 ML: 5 INJECTION INTRAVENOUS at 20:03

## 2025-01-14 RX ADMIN — BUSPIRONE HYDROCHLORIDE 5 MG: 5 TABLET ORAL at 20:03

## 2025-01-14 ASSESSMENT — PAIN DESCRIPTION - DESCRIPTORS
DESCRIPTORS: ACHING;DISCOMFORT;SORE
DESCRIPTORS: ACHING;DISCOMFORT
DESCRIPTORS: ACHING;DISCOMFORT

## 2025-01-14 ASSESSMENT — PAIN SCALES - GENERAL
PAINLEVEL_OUTOF10: 8
PAINLEVEL_OUTOF10: 8
PAINLEVEL_OUTOF10: 7

## 2025-01-14 ASSESSMENT — PAIN DESCRIPTION - PAIN TYPE
TYPE: ACUTE PAIN;SURGICAL PAIN
TYPE: ACUTE PAIN;SURGICAL PAIN

## 2025-01-14 ASSESSMENT — PAIN DESCRIPTION - LOCATION
LOCATION: CHEST
LOCATION: CHEST
LOCATION: HAND;CHEST

## 2025-01-14 ASSESSMENT — PAIN DESCRIPTION - ORIENTATION
ORIENTATION: RIGHT
ORIENTATION: RIGHT
ORIENTATION: RIGHT;LEFT

## 2025-01-14 ASSESSMENT — PAIN DESCRIPTION - FREQUENCY
FREQUENCY: CONTINUOUS
FREQUENCY: CONTINUOUS

## 2025-01-14 ASSESSMENT — PAIN DESCRIPTION - ONSET
ONSET: ON-GOING
ONSET: ON-GOING

## 2025-01-14 ASSESSMENT — PAIN - FUNCTIONAL ASSESSMENT
PAIN_FUNCTIONAL_ASSESSMENT: ACTIVITIES ARE NOT PREVENTED

## 2025-01-14 ASSESSMENT — PAIN DESCRIPTION - DIRECTION: RADIATING_TOWARDS: DENIES

## 2025-01-14 NOTE — PLAN OF CARE
Problem: Discharge Planning  Goal: Discharge to home or other facility with appropriate resources  1/14/2025 1353 by Alie Rubin RN  Outcome: Progressing  1/14/2025 0205 by Awa Hoffman RN  Outcome: Progressing     Problem: Pain  Goal: Verbalizes/displays adequate comfort level or baseline comfort level  1/14/2025 1353 by Alie Rubin RN  Outcome: Progressing  Flowsheets  Taken 1/14/2025 1316  Verbalizes/displays adequate comfort level or baseline comfort level:   Encourage patient to monitor pain and request assistance   Assess pain using appropriate pain scale  Taken 1/14/2025 0844  Verbalizes/displays adequate comfort level or baseline comfort level:   Encourage patient to monitor pain and request assistance   Assess pain using appropriate pain scale  1/14/2025 0205 by Awa Hoffman RN  Outcome: Progressing     Problem: Safety - Adult  Goal: Free from fall injury  1/14/2025 1353 by Alie Rubin RN  Outcome: Progressing  1/14/2025 0205 by Awa Hoffman RN  Outcome: Progressing     Problem: ABCDS Injury Assessment  Goal: Absence of physical injury  1/14/2025 1353 by Alie Rubin RN  Outcome: Progressing  1/14/2025 0205 by Awa Hoffman RN  Outcome: Progressing

## 2025-01-14 NOTE — PLAN OF CARE
Problem: Discharge Planning  Goal: Discharge to home or other facility with appropriate resources  Outcome: Progressing     Problem: Pain  Goal: Verbalizes/displays adequate comfort level or baseline comfort level  Outcome: Progressing     Problem: Safety - Adult  Goal: Free from fall injury  Outcome: Progressing     Problem: ABCDS Injury Assessment  Goal: Absence of physical injury  Outcome: Progressing     Problem: Skin/Tissue Integrity  Goal: Absence of new skin breakdown  Description: 1.  Monitor for areas of redness and/or skin breakdown  2.  Assess vascular access sites hourly  3.  Every 4-6 hours minimum:  Change oxygen saturation probe site  4.  Every 4-6 hours:  If on nasal continuous positive airway pressure, respiratory therapy assess nares and determine need for appliance change or resting period.  Outcome: Progressing     Problem: Neurosensory - Adult  Goal: Achieves stable or improved neurological status  Outcome: Progressing     Problem: Respiratory - Adult  Goal: Achieves optimal ventilation and oxygenation  Outcome: Progressing     Problem: Cardiovascular - Adult  Goal: Maintains optimal cardiac output and hemodynamic stability  Outcome: Progressing     Problem: Skin/Tissue Integrity - Adult  Goal: Skin integrity remains intact  Outcome: Progressing     Problem: Musculoskeletal - Adult  Goal: Return mobility to safest level of function  Outcome: Progressing     Problem: Gastrointestinal - Adult  Goal: Minimal or absence of nausea and vomiting  Outcome: Progressing     Problem: Genitourinary - Adult  Goal: Absence of urinary retention  Outcome: Progressing     Problem: Metabolic/Fluid and Electrolytes - Adult  Goal: Electrolytes maintained within normal limits  Outcome: Progressing

## 2025-01-15 LAB
ANION GAP SERPL CALCULATED.3IONS-SCNC: 10 MMOL/L (ref 3–16)
BUN SERPL-MCNC: 29 MG/DL (ref 7–20)
CALCIUM SERPL-MCNC: 7.6 MG/DL (ref 8.3–10.6)
CHLORIDE SERPL-SCNC: 94 MMOL/L (ref 99–110)
CO2 SERPL-SCNC: 28 MMOL/L (ref 21–32)
CREAT SERPL-MCNC: 2 MG/DL (ref 0.6–1.2)
GFR SERPLBLD CREATININE-BSD FMLA CKD-EPI: 27 ML/MIN/{1.73_M2}
GLUCOSE SERPL-MCNC: 68 MG/DL (ref 70–99)
POTASSIUM SERPL-SCNC: 3.7 MMOL/L (ref 3.5–5.1)
SODIUM SERPL-SCNC: 132 MMOL/L (ref 136–145)

## 2025-01-15 PROCEDURE — 2500000003 HC RX 250 WO HCPCS: Performed by: STUDENT IN AN ORGANIZED HEALTH CARE EDUCATION/TRAINING PROGRAM

## 2025-01-15 PROCEDURE — 6370000000 HC RX 637 (ALT 250 FOR IP): Performed by: INTERNAL MEDICINE

## 2025-01-15 PROCEDURE — 6360000002 HC RX W HCPCS: Performed by: INTERNAL MEDICINE

## 2025-01-15 PROCEDURE — 94640 AIRWAY INHALATION TREATMENT: CPT

## 2025-01-15 PROCEDURE — 80048 BASIC METABOLIC PNL TOTAL CA: CPT

## 2025-01-15 PROCEDURE — 94761 N-INVAS EAR/PLS OXIMETRY MLT: CPT

## 2025-01-15 PROCEDURE — 2500000003 HC RX 250 WO HCPCS

## 2025-01-15 PROCEDURE — 6370000000 HC RX 637 (ALT 250 FOR IP)

## 2025-01-15 PROCEDURE — 2580000003 HC RX 258: Performed by: INTERNAL MEDICINE

## 2025-01-15 PROCEDURE — 99233 SBSQ HOSP IP/OBS HIGH 50: CPT | Performed by: INTERNAL MEDICINE

## 2025-01-15 PROCEDURE — 36415 COLL VENOUS BLD VENIPUNCTURE: CPT

## 2025-01-15 PROCEDURE — 1200000000 HC SEMI PRIVATE

## 2025-01-15 PROCEDURE — 2700000000 HC OXYGEN THERAPY PER DAY

## 2025-01-15 RX ORDER — ACETAMINOPHEN 325 MG/1
650 TABLET ORAL ONCE
Status: COMPLETED | OUTPATIENT
Start: 2025-01-15 | End: 2025-01-15

## 2025-01-15 RX ADMIN — BUSPIRONE HYDROCHLORIDE 5 MG: 5 TABLET ORAL at 21:14

## 2025-01-15 RX ADMIN — EPOETIN ALFA-EPBX 10000 UNITS: 10000 INJECTION, SOLUTION INTRAVENOUS; SUBCUTANEOUS at 09:35

## 2025-01-15 RX ADMIN — PREDNISONE 50 MG: 20 TABLET ORAL at 11:32

## 2025-01-15 RX ADMIN — SULFAMETHOXAZOLE AND TRIMETHOPRIM 0.5 TABLET: 800; 160 TABLET ORAL at 11:32

## 2025-01-15 RX ADMIN — BUSPIRONE HYDROCHLORIDE 5 MG: 5 TABLET ORAL at 14:19

## 2025-01-15 RX ADMIN — IPRATROPIUM BROMIDE AND ALBUTEROL SULFATE 1 DOSE: 2.5; .5 SOLUTION RESPIRATORY (INHALATION) at 19:55

## 2025-01-15 RX ADMIN — SODIUM CHLORIDE, PRESERVATIVE FREE 10 ML: 5 INJECTION INTRAVENOUS at 21:15

## 2025-01-15 RX ADMIN — SODIUM CHLORIDE, PRESERVATIVE FREE 10 ML: 5 INJECTION INTRAVENOUS at 11:33

## 2025-01-15 RX ADMIN — HEPARIN SODIUM 5000 UNITS: 5000 INJECTION INTRAVENOUS; SUBCUTANEOUS at 14:19

## 2025-01-15 RX ADMIN — GUAIFENESIN 400 MG: 100 SOLUTION ORAL at 00:01

## 2025-01-15 RX ADMIN — ACETAMINOPHEN 650 MG: 325 TABLET ORAL at 11:52

## 2025-01-15 RX ADMIN — CEFAZOLIN 1000 MG: 1 INJECTION, POWDER, FOR SOLUTION INTRAMUSCULAR; INTRAVENOUS at 14:20

## 2025-01-15 ASSESSMENT — PAIN SCALES - GENERAL
PAINLEVEL_OUTOF10: 0
PAINLEVEL_OUTOF10: 10

## 2025-01-15 ASSESSMENT — PAIN DESCRIPTION - ONSET: ONSET: ON-GOING

## 2025-01-15 ASSESSMENT — PAIN DESCRIPTION - LOCATION: LOCATION: HEAD

## 2025-01-15 ASSESSMENT — PAIN DESCRIPTION - ORIENTATION: ORIENTATION: RIGHT;LEFT

## 2025-01-15 ASSESSMENT — PAIN DESCRIPTION - DESCRIPTORS: DESCRIPTORS: ACHING

## 2025-01-15 ASSESSMENT — PAIN - FUNCTIONAL ASSESSMENT: PAIN_FUNCTIONAL_ASSESSMENT: ACTIVITIES ARE NOT PREVENTED

## 2025-01-15 ASSESSMENT — PAIN DESCRIPTION - DIRECTION: RADIATING_TOWARDS: DENIES

## 2025-01-15 ASSESSMENT — PAIN DESCRIPTION - FREQUENCY: FREQUENCY: CONTINUOUS

## 2025-01-15 ASSESSMENT — PAIN DESCRIPTION - PAIN TYPE: TYPE: ACUTE PAIN

## 2025-01-15 NOTE — PLAN OF CARE
Problem: Discharge Planning  Goal: Discharge to home or other facility with appropriate resources  1/15/2025 1017 by Alie Rubin RN  Outcome: Adequate for Discharge  1/15/2025 0936 by Alie Rubin RN  Outcome: Progressing  1/14/2025 2252 by Awa Hoffman RN  Outcome: Progressing     Problem: Pain  Goal: Verbalizes/displays adequate comfort level or baseline comfort level  1/15/2025 1017 by Alie Rubin RN  Outcome: Adequate for Discharge  1/15/2025 0936 by Alie Rubin RN  Outcome: Progressing  1/14/2025 2252 by Awa Hoffman RN  Outcome: Progressing     Problem: Safety - Adult  Goal: Free from fall injury  1/15/2025 1017 by Alie Rubin RN  Outcome: Adequate for Discharge  1/15/2025 0936 by Alie Rubin RN  Outcome: Progressing  1/14/2025 2252 by Awa Hoffman RN  Outcome: Progressing     Problem: ABCDS Injury Assessment  Goal: Absence of physical injury  1/15/2025 1017 by Alie Rubin RN  Outcome: Adequate for Discharge  1/14/2025 2252 by Awa Hoffman RN  Outcome: Progressing

## 2025-01-15 NOTE — DIALYSIS
Treatment time: 3 hours  Net UF: 1052  ml     Pre weight:  65.7   kg  Bed weight one sheet one pillow  Post weight: 64,7   kg  EDW: TBD kg     Crit Line Used: Yes Ending Profile: A  Refill Present: NO refill HCT #1 27.5 minus  HCT#2  27.3 equals .2, max %BV tolerated 12.8  Ending profile A, first half of tx in B, second half tx in profile A.     Access used: RCW TDC, swelling and bruising noted proximal to insertion site, tender.  Functions well.  Access function: Well with  ml/min     Medications or blood products given: Epogen 10,000 units, heparin dwells to line.     Regular outpatient schedule: [facility] prior to admission     Summary of response to treatment: Patient tolerated treatment well with stable VS throughout tx.      Report given to Alie Rubin RN and copy of dialysis treatment record placed in chart, to be scanned into EMR.

## 2025-01-15 NOTE — PLAN OF CARE
Problem: Discharge Planning  Goal: Discharge to home or other facility with appropriate resources  1/15/2025 0936 by Alie Rubin RN  Outcome: Progressing  1/14/2025 2252 by Awa Hoffman RN  Outcome: Progressing     Problem: Pain  Goal: Verbalizes/displays adequate comfort level or baseline comfort level  1/15/2025 0936 by Alie Rubin RN  Outcome: Progressing  1/14/2025 2252 by Awa Hoffman RN  Outcome: Progressing     Problem: Safety - Adult  Goal: Free from fall injury  1/15/2025 0936 by Alie Rubin RN  Outcome: Progressing  1/14/2025 2252 by Awa Hoffman RN  Outcome: Progressing     Problem: ABCDS Injury Assessment  Goal: Absence of physical injury  1/14/2025 2252 by Awa Hoffman RN  Outcome: Progressing

## 2025-01-15 NOTE — PLAN OF CARE
Problem: Discharge Planning  Goal: Discharge to home or other facility with appropriate resources  1/14/2025 2252 by Awa Hoffman RN  Outcome: Progressing     Problem: Pain  Goal: Verbalizes/displays adequate comfort level or baseline comfort level  1/14/2025 2252 by Awa Hoffman RN  Outcome: Progressing     Problem: Safety - Adult  Goal: Free from fall injury  1/14/2025 2252 by Awa Hoffman RN  Outcome: Progressing     Problem: ABCDS Injury Assessment  Goal: Absence of physical injury  1/14/2025 2252 by Awa Hoffman RN  Outcome: Progressing     Problem: Skin/Tissue Integrity  Goal: Absence of new skin breakdown  Description: 1.  Monitor for areas of redness and/or skin breakdown  2.  Assess vascular access sites hourly  3.  Every 4-6 hours minimum:  Change oxygen saturation probe site  4.  Every 4-6 hours:  If on nasal continuous positive airway pressure, respiratory therapy assess nares and determine need for appliance change or resting period.  1/14/2025 2252 by Awa Hoffman RN  Outcome: Progressing     Problem: Neurosensory - Adult  Goal: Achieves stable or improved neurological status  Outcome: Progressing     Problem: Respiratory - Adult  Goal: Achieves optimal ventilation and oxygenation  Outcome: Progressing     Problem: Cardiovascular - Adult  Goal: Maintains optimal cardiac output and hemodynamic stability  Outcome: Progressing     Problem: Skin/Tissue Integrity - Adult  Goal: Skin integrity remains intact  Outcome: Progressing    Problem: Musculoskeletal - Adult  Goal: Return mobility to safest level of function  Outcome: Progressing     Problem: Gastrointestinal - Adult  Goal: Minimal or absence of nausea and vomiting  Outcome: Progressing     Problem: Genitourinary - Adult  Goal: Absence of urinary retention  Outcome: Progressing     Problem: Metabolic/Fluid and Electrolytes - Adult  Goal: Electrolytes maintained within normal limits  Outcome: Progressing

## 2025-01-16 VITALS
HEIGHT: 65 IN | BODY MASS INDEX: 27 KG/M2 | RESPIRATION RATE: 17 BRPM | HEART RATE: 70 BPM | OXYGEN SATURATION: 94 % | TEMPERATURE: 98.2 F | DIASTOLIC BLOOD PRESSURE: 71 MMHG | WEIGHT: 162.04 LBS | SYSTOLIC BLOOD PRESSURE: 136 MMHG

## 2025-01-16 LAB
ANION GAP SERPL CALCULATED.3IONS-SCNC: 8 MMOL/L (ref 3–16)
BUN SERPL-MCNC: 18 MG/DL (ref 7–20)
CALCIUM SERPL-MCNC: 7.5 MG/DL (ref 8.3–10.6)
CHLORIDE SERPL-SCNC: 96 MMOL/L (ref 99–110)
CO2 SERPL-SCNC: 28 MMOL/L (ref 21–32)
CREAT SERPL-MCNC: 1.4 MG/DL (ref 0.6–1.2)
GFR SERPLBLD CREATININE-BSD FMLA CKD-EPI: 41 ML/MIN/{1.73_M2}
GLUCOSE BLD-MCNC: 90 MG/DL (ref 70–99)
GLUCOSE SERPL-MCNC: 68 MG/DL (ref 70–99)
PERFORMED ON: NORMAL
POTASSIUM SERPL-SCNC: 4.1 MMOL/L (ref 3.5–5.1)
SODIUM SERPL-SCNC: 132 MMOL/L (ref 136–145)

## 2025-01-16 PROCEDURE — 6370000000 HC RX 637 (ALT 250 FOR IP): Performed by: INTERNAL MEDICINE

## 2025-01-16 PROCEDURE — 94761 N-INVAS EAR/PLS OXIMETRY MLT: CPT

## 2025-01-16 PROCEDURE — 2500000003 HC RX 250 WO HCPCS

## 2025-01-16 PROCEDURE — 94640 AIRWAY INHALATION TREATMENT: CPT

## 2025-01-16 PROCEDURE — 94010 BREATHING CAPACITY TEST: CPT

## 2025-01-16 PROCEDURE — 94669 MECHANICAL CHEST WALL OSCILL: CPT

## 2025-01-16 PROCEDURE — 97530 THERAPEUTIC ACTIVITIES: CPT

## 2025-01-16 PROCEDURE — 6370000000 HC RX 637 (ALT 250 FOR IP)

## 2025-01-16 PROCEDURE — 97535 SELF CARE MNGMENT TRAINING: CPT

## 2025-01-16 PROCEDURE — 80048 BASIC METABOLIC PNL TOTAL CA: CPT

## 2025-01-16 PROCEDURE — 2700000000 HC OXYGEN THERAPY PER DAY

## 2025-01-16 PROCEDURE — 97110 THERAPEUTIC EXERCISES: CPT

## 2025-01-16 PROCEDURE — 6360000002 HC RX W HCPCS: Performed by: INTERNAL MEDICINE

## 2025-01-16 PROCEDURE — 36415 COLL VENOUS BLD VENIPUNCTURE: CPT

## 2025-01-16 RX ORDER — BUSPIRONE HYDROCHLORIDE 5 MG/1
5 TABLET ORAL 3 TIMES DAILY
Qty: 90 TABLET | Refills: 0
Start: 2025-01-16

## 2025-01-16 RX ORDER — ACETAMINOPHEN 325 MG/1
650 TABLET ORAL EVERY 6 HOURS PRN
Status: DISCONTINUED | OUTPATIENT
Start: 2025-01-16 | End: 2025-01-16 | Stop reason: HOSPADM

## 2025-01-16 RX ORDER — HYDROCODONE BITARTRATE AND ACETAMINOPHEN 5; 325 MG/1; MG/1
1 TABLET ORAL EVERY 8 HOURS PRN
Qty: 6 TABLET | Refills: 0 | Status: SHIPPED | OUTPATIENT
Start: 2025-01-16 | End: 2025-01-19

## 2025-01-16 RX ORDER — PREDNISONE 50 MG/1
TABLET ORAL
Qty: 100 TABLET | Refills: 0
Start: 2025-01-17

## 2025-01-16 RX ORDER — TRAZODONE HYDROCHLORIDE 50 MG/1
25 TABLET, FILM COATED ORAL NIGHTLY PRN
Qty: 30 TABLET | Refills: 0
Start: 2025-01-16

## 2025-01-16 RX ADMIN — HEPARIN SODIUM 5000 UNITS: 5000 INJECTION INTRAVENOUS; SUBCUTANEOUS at 05:05

## 2025-01-16 RX ADMIN — FOLIC ACID 1 MG: 1 TABLET ORAL at 07:57

## 2025-01-16 RX ADMIN — ACETAMINOPHEN 650 MG: 325 TABLET ORAL at 11:07

## 2025-01-16 RX ADMIN — PREDNISONE 50 MG: 20 TABLET ORAL at 07:57

## 2025-01-16 RX ADMIN — IPRATROPIUM BROMIDE AND ALBUTEROL SULFATE 1 DOSE: 2.5; .5 SOLUTION RESPIRATORY (INHALATION) at 08:15

## 2025-01-16 RX ADMIN — SODIUM CHLORIDE, PRESERVATIVE FREE 10 ML: 5 INJECTION INTRAVENOUS at 08:01

## 2025-01-16 RX ADMIN — BUSPIRONE HYDROCHLORIDE 5 MG: 5 TABLET ORAL at 07:57

## 2025-01-16 ASSESSMENT — COPD QUESTIONNAIRES
CAT_TOTALSCORE: 31
QUESTION6_LEAVINGHOUSE: 5
QUESTION3_CHESTTIGHTNESS: 3
QUESTION7_SLEEPQUALITY: 1
QUESTION4_WALKINCLINE: 5
QUESTION5_HOMEACTIVITIES: 5
QUESTION2_CHESTPHLEGM: 3
QUESTION1_COUGHFREQUENCY: 4
QUESTION8_ENERGYLEVEL: 5

## 2025-01-16 ASSESSMENT — PAIN DESCRIPTION - LOCATION: LOCATION: BACK

## 2025-01-16 ASSESSMENT — PAIN SCALES - GENERAL
PAINLEVEL_OUTOF10: 4
PAINLEVEL_OUTOF10: 2

## 2025-01-16 ASSESSMENT — PAIN DESCRIPTION - DESCRIPTORS: DESCRIPTORS: ACHING

## 2025-01-16 NOTE — DISCHARGE INSTR - COC
Dressing/Treatment Tegaderm/transparent film dressing;Dry dressing 01/15/25 2100   Number of days:         Elimination:  Continence:   Bowel: no  Bladder: no  Urinary Catheter: None   Colostomy/Ileostomy/Ileal Conduit: No       Date of Last BM: 1/15/25    Intake/Output Summary (Last 24 hours) at 1/16/2025 0855  Last data filed at 1/15/2025 1746  Gross per 24 hour   Intake 222 ml   Output --   Net 222 ml     I/O last 3 completed shifts:  In: 222 [P.O.:222]  Out: -     Safety Concerns:     None    Impairments/Disabilities:      None    Nutrition Therapy:  Current Nutrition Therapy:   - Oral Diet:  General    Routes of Feeding: Oral  Liquids: Thin Liquids  Daily Fluid Restriction: no  Last Modified Barium Swallow with Video (Video Swallowing Test): not done    Treatments at the Time of Hospital Discharge:   Respiratory Treatments: see med list  Oxygen Therapy:  is on oxygen at 3 L/min per nasal cannula.  Ventilator:    - No ventilator support    Rehab Therapies: Physical Therapy and Occupational Therapy  Weight Bearing Status/Restrictions: No weight bearing restrictions  Other Medical Equipment (for information only, NOT a DME order):  wheelchair  Other Treatments: n/a    Patient's personal belongings (please select all that are sent with patient):  None    RN SIGNATURE:  Electronically signed by Jl Herron RN on 1/16/25 at 9:33 AM EST    CASE MANAGEMENT/SOCIAL WORK SECTION    Inpatient Status Date: 1/6/2025    Readmission Risk Assessment Score: 25%  [facility] RISK OF UNPLANNED READMISSION 2.0             25.1 Total Score        Discharging to Facility/ Agency   Name: [facility]  Phone: [phone]        / signature: Electronically signed by Zuleyma Minor on 1/16/25 at 11:29 AM EST    PHYSICIAN SECTION    Prognosis: Fair    Condition at Discharge: Stable    Rehab Potential (if transferring to Rehab): Good    Recommended Labs or Other Treatments After Discharge:    Avoid aspiration   Start

## 2025-01-16 NOTE — CARE COORDINATION
Reviewed chart, spoke with Wilma at [facility], precert still pending. She does not have a pending number. CM following.     8:55 - Noted DC order, called and spoke with Sherry at [facility] who states precert is still pending. States she will reach out to her. CM following.     1125 - Spoke with Wilma [facility], precert is back. Plan for DC today.    1145 - CM delivered second IMM to patient. Verbal explanation provided of 4 hours to review notice. Patient voiced understanding and is agreeable to discharge.

## 2025-01-16 NOTE — FLOWSHEET NOTE
01/15/25 2100   Vital Signs   Temp 97.5 °F (36.4 °C)   Temp Source Oral   Pulse 78   Heart Rate Source Monitor   Respirations 16   /74   MAP (Calculated) 90   BP Location Left upper arm   BP Method Automatic   Patient Position Lying right side     Care assumed for 7pm-7am shift. Pt found lying on her right side asleep. Pt refusing her glycolax and her heparin tonight. She states she moved her bowels already today and she doesn't want heparin as she \"had too many shots today\". Pt offered pain pill at this time and she refused. VS obtained and assessment performed. Pt given opportunity to ask questions, POC reviewed and all needs addressed at this time. Call light within reach and bed locked and low.

## 2025-01-16 NOTE — CARE COORDINATION
CASE MANAGEMENT DISCHARGE SUMMARY      Discharge to: [facility]    Precertification completed: yes    Hospital Exemption Notification (HENS) completed: yes    IMM given: 1/16/2025     Transportation:       Medical Transport explained to pt/family. Pt/family voice no agency preference.      Agency used: [facility]     time: 1245     Ambulance form completed: Yes    Confirmed discharge plan with:     Patient: yes     Family: yes- called winston Salgado     Facility/Agency, name: Wilma @ [facility]  Phone: [phone]     RN name: Cristobal WOODS    Note: Discharging nurse to complete KRISTIN, reconcile AVS, and place final copy with patient's discharge packet. RN to ensure that written prescriptions for  Level II medications are sent with patient to the facility as per protocol.

## 2025-01-16 NOTE — PROGRESS NOTES
01/07/25 1443   Encounter Summary   Encounter Overview/Reason Spiritual/Emotional Needs   Service Provided For Patient   Referral/Consult From Nurse   Support System Children;Family members   Last Encounter  01/07/25  ( provided prayer, prayer shawl, active, empathetic listening, [facility] Angel Ling ([phone]) for prayer chain. Gave prayer card, scripture note and Spiritual Health contact card/MARK)   Complexity of Encounter Moderate   Begin Time 1420   End Time  1507   Total Time Calculated 47 min   Crisis   Type Emotional distress   Spiritual/Emotional needs   Type Emotional Distress;Spiritual Distress;Spiritual Support   Grief, Loss, and Adjustments   Type Adjustment to illness;Life Adjustments   Assessment/Intervention/Outcome   Assessment Coping;Hopeful;Concerns with suffering;Anxious;Powerlessness;Stress overload;Sad   Intervention Active listening;Discussed belief system/Episcopalian practices/marielos;Discussed illness injury and it’s impact;Explored/Affirmed feelings, thoughts, concerns;Explored Coping Skills/Resources;Prayer (assurance of)/[place];Sustaining Presence/Ministry of presence;Read/Provided Scripture   Outcome Comfort;Connection/Belonging;Coping;Encouraged;Engaged in conversation;Expressed Gratitude;Expressed feelings, needs, and concerns;Less anxious, Less agitated;Venting emotion      Lauren Hernandez EdD, BOO GU ([facility])    Thank you for consulting Spiritual Health    If you would like a 's presence for emotional, spiritual, grief or comfort care,   please dial \"[phone]\" and ask for the  on-call to be paged.    For help with Advanced Care Planning, Power of  for Healthcare or Living Will forms, you may also call us directly:    [phone]) Sam  [phone]) Angelina  [phone]) Outpatient    [facility]    
   01/10/25 2000   RT Protocol   History Pulmonary Disease 2   Respiratory pattern 0   Breath sounds 2   Cough 0   Indications for Bronchodilator Therapy Decreased or absent breath sounds   Bronchodilator Assessment Score 4     RT Inhaler-Nebulizer Bronchodilator Protocol Note    There is a bronchodilator order in the chart from a provider indicating to follow the RT Bronchodilator Protocol and there is an “Initiate RT Inhaler-Nebulizer Bronchodilator Protocol” order as well (see protocol at bottom of note).    CXR Findings:  No results found.    The findings from the last RT Protocol Assessment were as follows:   History Pulmonary Disease: Chronic pulmonary disease  Respiratory Pattern: Regular pattern and RR 12-20 bpm  Breath Sounds: Slightly diminished and/or crackles  Cough: Strong, spontaneous, non-productive  Indication for Bronchodilator Therapy: Decreased or absent breath sounds  Bronchodilator Assessment Score: 4    Aerosolized bronchodilator medication orders have been revised according to the RT Inhaler-Nebulizer Bronchodilator Protocol below.    Respiratory Therapist to perform RT Therapy Protocol Assessment initially then follow the protocol.  Repeat RT Therapy Protocol Assessment PRN for score 0-3 or on second treatment, BID, and PRN for scores above 3.    No Indications - adjust the frequency to every 6 hours PRN wheezing or bronchospasm, if no treatments needed after 48 hours then discontinue using Per Protocol order mode.     If indication present, adjust the RT bronchodilator orders based on the Bronchodilator Assessment Score as indicated below.  Use Inhaler orders unless patient has one or more of the following: on home nebulizer, not able to hold breath for 10 seconds, is not alert and oriented, cannot activate and use MDI correctly, or respiratory rate 25 breaths per minute or more, then use the equivalent nebulizer order(s) with same Frequency and PRN reasons based on the score.  If a patient 
   01/13/25 0000   RT Protocol   History Pulmonary Disease 2   Respiratory pattern 0   Breath sounds 2   Cough 0   Indications for Bronchodilator Therapy Decreased or absent breath sounds   Bronchodilator Assessment Score 4     RT Inhaler-Nebulizer Bronchodilator Protocol Note    There is a bronchodilator order in the chart from a provider indicating to follow the RT Bronchodilator Protocol and there is an “Initiate RT Inhaler-Nebulizer Bronchodilator Protocol” order as well (see protocol at bottom of note).    CXR Findings:  No results found.    The findings from the last RT Protocol Assessment were as follows:   History Pulmonary Disease: Chronic pulmonary disease  Respiratory Pattern: Regular pattern and RR 12-20 bpm  Breath Sounds: Slightly diminished and/or crackles  Cough: Strong, spontaneous, non-productive  Indication for Bronchodilator Therapy: Decreased or absent breath sounds  Bronchodilator Assessment Score: 4    Aerosolized bronchodilator medication orders have been revised according to the RT Inhaler-Nebulizer Bronchodilator Protocol below.    Respiratory Therapist to perform RT Therapy Protocol Assessment initially then follow the protocol.  Repeat RT Therapy Protocol Assessment PRN for score 0-3 or on second treatment, BID, and PRN for scores above 3.    No Indications - adjust the frequency to every 6 hours PRN wheezing or bronchospasm, if no treatments needed after 48 hours then discontinue using Per Protocol order mode.     If indication present, adjust the RT bronchodilator orders based on the Bronchodilator Assessment Score as indicated below.  Use Inhaler orders unless patient has one or more of the following: on home nebulizer, not able to hold breath for 10 seconds, is not alert and oriented, cannot activate and use MDI correctly, or respiratory rate 25 breaths per minute or more, then use the equivalent nebulizer order(s) with same Frequency and PRN reasons based on the score.  If a patient 
   01/14/25 0800   RT Protocol   History Pulmonary Disease 2   Respiratory pattern 0   Breath sounds 2   Cough 0   Indications for Bronchodilator Therapy Decreased or absent breath sounds   Bronchodilator Assessment Score 4       
   01/15/25 0900   RT Protocol   History Pulmonary Disease 2   Respiratory pattern 0   Breath sounds 2   Cough 0   Indications for Bronchodilator Therapy Decreased or absent breath sounds   Bronchodilator Assessment Score 4       
   01/15/25 1955   Oxygen Therapy/Pulse Ox   O2 Therapy Oxygen   O2 Device Nasal cannula   O2 Flow Rate (L/min) (S)  3 L/min  (decreased to 3)   SpO2 99 %       
  Physician Progress Note      PATIENT:               JOSEPH PICHARDO  CSN #:                  [id number]  :                       1955  ADMIT DATE:       2025 8:52 AM  DISCH DATE:  RESPONDING  PROVIDER #:        Juan Kelsey MD          QUERY TEXT:    Pt admitted with hematemesis, PNA. Pt noted to have wbc of 3.9, pulse 96,. If   possible, please document in the progress notes and discharge summary if you   are evaluating and /or treating any of the following:  The medical record reflects the following:  Risk Factors: hx of Laryngeal cancer, ESRD, ANCA vasculitis, HCAP, chronic   resp failure.  Clinical Indicators: on admission--wbc 3.9, pulse 96.  CT chest-- \" 1. Multifocal bilateral pneumonia with small parapneumonic   effusions, right greater than left. -continue IV cefepime and Vancomycin.   IM pn--\" - Blood culture positive for MSSA bacteremia. Had   thoracentesis done and 500 cc of fluid removed. S aureus bacteremia- it is   MSSA per BCID panel. Source possibly HD catheter. Discussed with nephrology.   Treat with IV Ancef and do catheter exchange in a few days.\"   Neph--\" 5- MSSA bacteremia: Patient will eventually need tunneled dialysis   catheter exchange after 1 week of IV antibiotics.\"  Treatment: iv vanc,cefepime, neph consult, cxray, CT, labs, cultures,   essential home meds, supportive care    Thank you,  Silvano Marroquin RN,BSB  Options provided:  -- Sepsis, present on admission due to PNA  -- Sepsis due to HD catheter infection present on admission  -- PNA, HD catheter infection without Sepsis  -- Other - I will add my own diagnosis  -- Disagree - Not applicable / Not valid  -- Disagree - Clinically unable to determine / Unknown  -- Refer to Clinical Documentation Reviewer    PROVIDER RESPONSE TEXT:    This patient has PNA, HD catheter infection without Sepsis.    Query created by: Silvano Marroquin on 1/10/2025 3:10 AM      Electronically signed by:  Juan Kelsey MD 1/15/2025 
  Speech Language Pathology  Attempt Note     Name: Emily Alegre  : 1955  Medical Diagnosis: Hematemesis [K92.0]  ESRD on dialysis (HCC) [N18.6, Z99.2]  Hematemesis with nausea [K92.0]  Pneumonia of both lungs due to infectious organism, unspecified part of lung [J18.9]      SLP attempted to see pt for bedside swallow evaluation (BSE). Order acknowledged and chart reviewed for completion of eval. Evaluation / treatment unable to be completed as pt is NPO for pulmonology consult and possible procesdure. SLP to re-attempt as pt's condition and schedule allows. RN aware. No charges.    Thank you,    Aimee Coronel M.A. SLP  Speech-Language Pathologist  [place] Lic #[id number]  [phone]    
  Speech Language Pathology  Attempt Note     Name: Emily Alegre  : 1955  Medical Diagnosis: Hematemesis [K92.0]  ESRD on dialysis (HCC) [N18.6, Z99.2]  Hematemesis with nausea [K92.0]  Pneumonia of both lungs due to infectious organism, unspecified part of lung [J18.9]      SLP attempted to see pt for dysphagia tx. Treatment unable to be completed as pt off floor for procedure. SLP to re-attempt as pt's condition and schedule allows. RN aware. No charges.    Thank you,    Aimee Coronel M.A. SLP  Speech-Language Pathologist  [place] Lic #[id number]  [phone]      
  Speech Language Pathology  Attempt Note     Name: Emily Alegre  : 1955  Medical Diagnosis: Hematemesis [K92.0]  ESRD on dialysis (HCC) [N18.6, Z99.2]  Hematemesis with nausea [K92.0]  Pneumonia of both lungs due to infectious organism, unspecified part of lung [J18.9]      SLP attempted to see pt for dysphagia tx. Treatment unable to be completed as pt politely declines. SLP to re-attempt as pt's condition and schedule allows. RN aware. No charges.    Thank you,    Aimee Coronel M.A. SLP  Speech-Language Pathologist  [place] Lic #[id number]      
  Speech Language Pathology  Swallowing Disorders and Dysphagia    Dysphagia Treatment/Follow-Up Note  Facility/Department: [address] MEDICAL-SURGICAL    Recommendations: SLP recommends to advance to IDDSI 7 Regular- Easy To Chew; IDDSI 0 Thin Liquids; Meds crushed in puree as able  Risk Management: small bites/sips, increase physical mobility as able, alternate bites/sips, slow rate of intake, general GERD precautions, general aspiration precautions, and hold PO and contact SLP if s/s of aspiration or worsening respiratory status develop.      NAME:Emily Alegre  : 1955 (69 y.o.)   MRN: [id number]  ROOM: [address] [place] [address]  ADMISSION DATE: 2025  PATIENT DIAGNOSIS(ES): Hematemesis [K92.0]  ESRD on dialysis (HCC) [N18.6, Z99.2]  Hematemesis with nausea [K92.0]  Pneumonia of both lungs due to infectious organism, unspecified part of lung [J18.9]  Allergies   Allergen Reactions    Percocet [Oxycodone-Acetaminophen] Itching    Chocolate Other (See Comments)     Vomitting        DATE ONSET: 2025    Pain: The patient does not complain of pain       Current Diet: ADULT DIET; Dysphagia - Minced and Moist  ADULT ORAL NUTRITION SUPPLEMENT; Breakfast, Lunch, Dinner; Renal Oral Supplement      Dysphagia Treatment and Impressions:  Subjective: Pt seen in room at bedside with RN (Joan) permission  Noteworthy events reported/Chart Review: No new concerns.  Patient tolerance to current diet and treatment:Pt not satisfied with minced and moist solids. Pt c/o that veggies are not cooked long enough. Pt does not care for gravies. Pt requesting grilled cheese and tomato soup.      Respiratory Status: Pt with SPO2% of 98 on 4 LPM NC with RR of 16  Oral Care Status:    Missing/broken teeth  Oral Care Completed?   [] Yes   [] No  Attempted- pt declined but plans to plummer with son after lunch    Liquid PO Trials:   IDDSI 0 Thin:  Assessed via straw: no anterior bolus loss , suspect functional A-P bolus transit, swallow timing 
 Report written to Petr WOODS per the request of the charge nurse. Pt denies needs at this time. No s/s of distress. Respirations easy and unlabored. Pt stable. Bed in low position call light within reach. No further needs at this time.    
/71   Pulse 82   Temp 97.9 °F (36.6 °C) (Oral)   Resp 14   Ht 1.651 m (5' 5\")   Wt 73.5 kg (162 lb 0.6 oz)   SpO2 100%   BMI 26.96 kg/m²     Pt awake in bed. Pt alert and oriented/disoriented at times. Assessment complete. Meds passed. Pt denies needs at this time.        Bedside Mobility Assessment Tool (BMAT):     Assessment Level 1- Sit and Shake    1. From a semi-reclined position, ask patient to sit up and rotate to a seated position at the side of the bed. Can use the bedrail.    2. Ask patient to reach out and grab your hand and shake making sure patient reaches across his/her midline.   Pass- Patient is able to come to a seated position, maintain core strength. Maintains seated balance while reaching across midline. Move on to Assessment Level 2.     Assessment Level 2- Stretch and Point   1. With patient in seated position at the side of the bed, have patient place both feet on the floor (or stool) with knees no higher than hips.    2. Ask patient to stretch one leg and straighten the knee, then bend the ankle/flex and point the toes. If appropriate, repeat with the other leg.   Pass- Patient is able to demonstrate appropriate quad strength on intended weight bearing limb(s). Move onto Assessment Level 3.     Assessment Level 3- Stand   1. Ask patient to elevate off the bed or chair (seated to standing) using an assistive device (cane, bedrail).    2. Patient should be able to raise buttocks off be and hold for a count of five. May repeat once.   Fail- Patient unable to demonstrate standing stability. Patient is MOBILITY LEVEL 3.     Assessment Level 4- Walk   1. Ask patient to march in place at bedside.    2. Then ask patient to advance step and return each foot. Some medical conditions may render a patient from stepping backwards, use your best clinical judgement.   Fail- Patient not able to complete tasks OR requires use of assistive device. Patient is MOBILITY LEVEL 3.       Mobility Level- 
/74   Pulse 69   Temp 97.7 °F (36.5 °C) (Oral)   Resp 16   Ht 1.651 m (5' 5\")   Wt 72.2 kg (159 lb 1.6 oz)   SpO2 100%   BMI 26.48 kg/m²     Pt awake in bed. Pt alert and orientedX4, but can be disoriented at times. Pt states a pain level of 6 on a 0-10 pain scale. Pt given PRN Waldron. Administration can be found in the MAR. Assessment complete. Meds passed. Pt denies needs at this time.        Bedside Mobility Assessment Tool (BMAT):     Assessment Level 1- Sit and Shake    1. From a semi-reclined position, ask patient to sit up and rotate to a seated position at the side of the bed. Can use the bedrail.    2. Ask patient to reach out and grab your hand and shake making sure patient reaches across his/her midline.   Fail- Patient is unable to perform tasks, patient is MOBILITY LEVEL 1.    Assessment Level 2- Stretch and Point   1. With patient in seated position at the side of the bed, have patient place both feet on the floor (or stool) with knees no higher than hips.    2. Ask patient to stretch one leg and straighten the knee, then bend the ankle/flex and point the toes. If appropriate, repeat with the other leg.   Fail- Patient is unable to complete task. Patient is MOBILITY LEVEL 2.     Assessment Level 3- Stand   1. Ask patient to elevate off the bed or chair (seated to standing) using an assistive device (cane, bedrail).    2. Patient should be able to raise buttocks off be and hold for a count of five. May repeat once.   Fail- Patient unable to demonstrate standing stability. Patient is MOBILITY LEVEL 3.     Assessment Level 4- Walk   1. Ask patient to march in place at bedside.    2. Then ask patient to advance step and return each foot. Some medical conditions may render a patient from stepping backwards, use your best clinical judgement.   Fail- Patient not able to complete tasks OR requires use of assistive device. Patient is MOBILITY LEVEL 3.       Mobility Level- 1    
4 Eyes Skin Assessment     NAME:  Emily Alegre  YOB: 1955  MEDICAL RECORD NUMBER:  [id number]    The patient is being assessed for  Other Low jose alberto score     I agree that at least one RN has performed a thorough Head to Toe Skin Assessment on the patient. ALL assessment sites listed below have been assessed.      Areas assessed by both nurses:    Head, Face, Ears, Shoulders, Back, Chest, Arms, Elbows, Hands, Sacrum. Buttock, Coccyx, Ischium, Legs. Feet and Heels, and Under Medical Devices         Scattered bruising, abrasions and petechiae  Blanchable redness to buttocks - preventive mepilex in place , Q2H turning        Does the Patient have a Wound? No noted wound(s)       Jose Alberto Prevention initiated by RN: Yes  Wound Care Orders initiated by RN: No    Pressure Injury (Stage 3,4, Unstageable, DTI, NWPT, and Complex wounds) if present, place Wound referral order by RN under : No    New Ostomies, if present place, Ostomy referral order under : No     Nurse 1 eSignature: Electronically signed by Alie Rubin RN on 1/8/25 at 5:28 PM EST    **SHARE this note so that the co-signing nurse can place an eSignature**    Nurse 2 eSignature: Electronically signed by Daren Biswas RN on 1/8/25 at 5:30 PM EST   
4 Eyes Skin Assessment     NAME:  Emily Alegre  YOB: 1955  MEDICAL RECORD NUMBER:  [id number]    The patient is being assessed for  Other Low jose alberto score     I agree that at least one RN has performed a thorough Head to Toe Skin Assessment on the patient. ALL assessment sites listed below have been assessed.      Areas assessed by both nurses:    Head, Face, Ears, Shoulders, Back, Chest, Arms, Elbows, Hands, Sacrum. Buttock, Coccyx, Ischium, Legs. Feet and Heels, and Under Medical Devices     Scattered bruising, abrasions and petechiae  Blanchable redness to buttocks - preventive mepilex in place , Q2H turning            Does the Patient have a Wound? No noted wound(s)       Jose Alberto Prevention initiated by RN: Yes  Wound Care Orders initiated by RN: No    Pressure Injury (Stage 3,4, Unstageable, DTI, NWPT, and Complex wounds) if present, place Wound referral order by RN under : No    New Ostomies, if present place, Ostomy referral order under : No     Nurse 1 eSignature: Electronically signed by Alie Rubin RN on 1/9/25 at 11:17 AM EST    **SHARE this note so that the co-signing nurse can place an eSignature**    Nurse 2 eSignature: Electronically signed by Jl Herron RN on 1/9/25 at 3:56 PM EST   
4 Eyes Skin Assessment     NAME:  Emily Alegre  YOB: 1955  MEDICAL RECORD NUMBER:  [id number]    The patient is being assessed for  Other low jose alberto score / discharge to SNF     I agree that at least one RN has performed a thorough Head to Toe Skin Assessment on the patient. ALL assessment sites listed below have been assessed.      Areas assessed by both nurses:    Head, Face, Ears, Shoulders, Back, Chest, Arms, Elbows, Hands, Sacrum. Buttock, Coccyx, Ischium, Legs. Feet and Heels, and Under Medical Devices     Scattered bruising/ abrasions / petechiae   Blanchable redness to buttocks         Does the Patient have a Wound? Yes wound(s) were present on assessment. LDA wound assessment was Initiated and completed by RN       Jose Alberto Prevention initiated by RN: Yes  Wound Care Orders initiated by RN: Yes    Pressure Injury (Stage 3,4, Unstageable, DTI, NWPT, and Complex wounds) if present, place Wound referral order by RN under : No    New Ostomies, if present place, Ostomy referral order under : No     Nurse 1 eSignature: Electronically signed by Alie Rubin RN on 1/15/25 at 11:46 AM EST    **SHARE this note so that the co-signing nurse can place an eSignature**    Nurse 2 eSignature: Electronically signed by Daren Biswas RN on 1/15/25 at 11:50 AM EST   
4 Eyes Skin Assessment     NAME:  Emily Alegre  YOB: 1955  MEDICAL RECORD NUMBER:  [id number]    The patient is being assessed for  Admission    I agree that at least one RN has performed a thorough Head to Toe Skin Assessment on the patient. ALL assessment sites listed below have been assessed.      Areas assessed by both nurses:    Head, Face, Ears, Shoulders, Back, Chest, Arms, Elbows, Hands, Sacrum. Buttock, Coccyx, Ischium, Legs. Feet and Heels, and Under Medical Devices     Scattered bruising, abrasions and petechiae  Blanchable redness to buttocks - preventive mepilex in place , Q2H turning                  Does the Patient have a Wound? No noted wound(s)       Romario Prevention initiated by RN: Yes  Wound Care Orders initiated by RN: Yes    Pressure Injury (Stage 3,4, Unstageable, DTI, NWPT, and Complex wounds) if present, place Wound referral order by RN under : No    New Ostomies, if present place, Ostomy referral order under : No     Nurse 1 eSignature: Electronically signed by Alie Rubin RN on 1/7/25 at 5:59 PM EST    **SHARE this note so that the co-signing nurse can place an eSignature**    Nurse 2 eSignature: Electronically signed by Ayo Vasquez RN on 1/7/25 at 6:50 PM EST   
4 Eyes Skin Assessment     NAME:  Emily Alegre  YOB: 1955  MEDICAL RECORD NUMBER:  [id number]  Patient has bruising noted throughout body, primarily upper chest. Reddened vivienne area and sacrum blanchable, barrier cream applied, rash noted to  lower back and throughout body, no itching noted. Hernia to abdomen noted  The patient is being assessed for  Shift Handoff    I agree that at least one RN has performed a thorough Head to Toe Skin Assessment on the patient. ALL assessment sites listed below have been assessed.      Areas assessed by both nurses:    Head, Face, Ears, Shoulders, Back, Chest, Arms, Elbows, Hands, Sacrum. Buttock, Coccyx, Ischium, and Legs. Feet and Heels        Does the Patient have a Wound? No noted wound(s)       Romario Prevention initiated by RN: Yes  Wound Care Orders initiated by RN: No    Pressure Injury (Stage 3,4, Unstageable, DTI, NWPT, and Complex wounds) if present, place Wound referral order by RN under : No    New Ostomies, if present place, Ostomy referral order under : No     Nurse 1 eSignature: Electronically signed by Jl Herron RN on 1/16/25 at 8:48 AM EST    **SHARE this note so that the co-signing nurse can place an eSignature**    Nurse 2 eSignature: Electronically signed by Francia Grimaldo RN on 1/16/25 at 10:49 AM EST   
4 Eyes Skin Assessment     NAME:  Emily Alegre  YOB: 1955  MEDICAL RECORD NUMBER:  [id number]    The patient is being assessed for  Other Low jose alberto score     I agree that at least one RN has performed a thorough Head to Toe Skin Assessment on the patient. ALL assessment sites listed below have been assessed.      Areas assessed by both nurses:    Head, Face, Ears, Shoulders, Back, Chest, Arms, Elbows, Hands, Sacrum. Buttock, Coccyx, Ischium, Legs. Feet and Heels, and Under Medical Devices           Scattered bruising, abrasions and petechiae  Blanchable redness to buttocks - preventive mepilex in place , Q2H turning          Does the Patient have a Wound? No noted wound(s)       Jose Alberto Prevention initiated by RN: Yes  Wound Care Orders initiated by RN: Yes    Pressure Injury (Stage 3,4, Unstageable, DTI, NWPT, and Complex wounds) if present, place Wound referral order by RN under : No    New Ostomies, if present place, Ostomy referral order under : No     Nurse 1 eSignature: Electronically signed by Alie Rubin RN on 1/7/25 at 5:57 PM EST    **SHARE this note so that the co-signing nurse can place an eSignature**    Nurse 2 eSignature: Electronically signed by Ayo Vasquez RN on 1/7/25 at 6:50 PM EST   
4 Eyes Skin Assessment     NAME:  Emily Alegre  YOB: 1955  MEDICAL RECORD NUMBER:  [id number]    The patient is being assessed for  Other LOW RUSSEL    I agree that at least one RN has performed a thorough Head to Toe Skin Assessment on the patient. ALL assessment sites listed below have been assessed.      Areas assessed by both nurses:    Head, Face, Ears, Shoulders, Back, Chest, Arms, Elbows, Hands, Sacrum. Buttock, Coccyx, Ischium, and Legs. Feet and Heels  Mepilex on sacral area for prevention, scattered bruises, petechiae and abrasion     Does the Patient have a Wound? No noted wound(s)       Russel Prevention initiated by RN: Yes Q2H turn  Wound Care Orders initiated by RN: No    Pressure Injury (Stage 3,4, Unstageable, DTI, NWPT, and Complex wounds) if present, place Wound referral order by RN under : No    New Ostomies, if present place, Ostomy referral order under : No     Nurse 1 eSignature: Electronically signed by Gustavo Rendon RN on 1/11/25 at 4:36 AM EST    **SHARE this note so that the co-signing nurse can place an eSignature**    Nurse 2 eSignature: Electronically signed by Tarsha Dai RN on 1/11/25 at 4:39 AM EST   
Bed side report given to CHUCK Billings. Call light within reach, bed alarm on.   
Bed side report given to CHUCK Feldman.   
Bed side report given to CHUCK Harvey. Blood transfusion infusing per orders. VSS. Pt denies needs at this time. Call light within reach. Bed alarm on.   
Bed side report given to CHUCK Harvey. Patient awake in bed, denies needs. Call light within reach. Bed alarm on.   
Bed side report given to RN Awa. Patient awake in bed, denies needs. Call light within reach. Bed alarm on.   
Bedside Mobility Assessment Tool (BMAT):     Assessment Level 1- Sit and Shake    1. From a semi-reclined position, ask patient to sit up and rotate to a seated position at the side of the bed. Can use the bedrail.    2. Ask patient to reach out and grab your hand and shake making sure patient reaches across his/her midline.   Fail- Patient is unable to perform tasks, patient is MOBILITY LEVEL 1.    Assessment Level 2- Stretch and Point   1. With patient in seated position at the side of the bed, have patient place both feet on the floor (or stool) with knees no higher than hips.    2. Ask patient to stretch one leg and straighten the knee, then bend the ankle/flex and point the toes. If appropriate, repeat with the other leg.   Fail- Patient is unable to complete task. Patient is MOBILITY LEVEL 2.     Assessment Level 3- Stand   1. Ask patient to elevate off the bed or chair (seated to standing) using an assistive device (cane, bedrail).    2. Patient should be able to raise buttocks off be and hold for a count of five. May repeat once.   Fail- Patient unable to demonstrate standing stability. Patient is MOBILITY LEVEL 3.     Assessment Level 4- Walk   1. Ask patient to march in place at bedside.    2. Then ask patient to advance step and return each foot. Some medical conditions may render a patient from stepping backwards, use your best clinical judgement.   Fail- Patient not able to complete tasks OR requires use of assistive device. Patient is MOBILITY LEVEL 3.       Mobility Level- 1       
Bedside Mobility Assessment Tool (BMAT):     Assessment Level 1- Sit and Shake    1. From a semi-reclined position, ask patient to sit up and rotate to a seated position at the side of the bed. Can use the bedrail.    2. Ask patient to reach out and grab your hand and shake making sure patient reaches across his/her midline.   Fail- Patient is unable to perform tasks, patient is MOBILITY LEVEL 1.    Assessment Level 2- Stretch and Point   1. With patient in seated position at the side of the bed, have patient place both feet on the floor (or stool) with knees no higher than hips.    2. Ask patient to stretch one leg and straighten the knee, then bend the ankle/flex and point the toes. If appropriate, repeat with the other leg.   Fail- Patient is unable to complete task. Patient is MOBILITY LEVEL 2.     Assessment Level 3- Stand   1. Ask patient to elevate off the bed or chair (seated to standing) using an assistive device (cane, bedrail).    2. Patient should be able to raise buttocks off be and hold for a count of five. May repeat once.   Fail- Patient unable to demonstrate standing stability. Patient is MOBILITY LEVEL 3.     Assessment Level 4- Walk   1. Ask patient to march in place at bedside.    2. Then ask patient to advance step and return each foot. Some medical conditions may render a patient from stepping backwards, use your best clinical judgement.   Fail- Patient not able to complete tasks OR requires use of assistive device. Patient is MOBILITY LEVEL 3.       Mobility Level- 1    Patient is not able to demonstrated the ability to move from a reclining position to an upright position within the recliner. however patient is alert, oriented and able to provide informed consent    
Bedside Mobility Assessment Tool (BMAT):     Assessment Level 1- Sit and Shake    1. From a semi-reclined position, ask patient to sit up and rotate to a seated position at the side of the bed. Can use the bedrail.    2. Ask patient to reach out and grab your hand and shake making sure patient reaches across his/her midline.   Fail- Patient is unable to perform tasks, patient is MOBILITY LEVEL 1.    Assessment Level 2- Stretch and Point   1. With patient in seated position at the side of the bed, have patient place both feet on the floor (or stool) with knees no higher than hips.    2. Ask patient to stretch one leg and straighten the knee, then bend the ankle/flex and point the toes. If appropriate, repeat with the other leg.   Fail- Patient is unable to complete task. Patient is MOBILITY LEVEL 2.     Assessment Level 3- Stand   1. Ask patient to elevate off the bed or chair (seated to standing) using an assistive device (cane, bedrail).    2. Patient should be able to raise buttocks off be and hold for a count of five. May repeat once.   Fail- Patient unable to demonstrate standing stability. Patient is MOBILITY LEVEL 3.     Assessment Level 4- Walk   1. Ask patient to march in place at bedside.    2. Then ask patient to advance step and return each foot. Some medical conditions may render a patient from stepping backwards, use your best clinical judgement.   Fail- Patient not able to complete tasks OR requires use of assistive device. Patient is MOBILITY LEVEL 3.       Mobility Level- 1    Patient is not able to demonstrated the ability to move from a reclining position to an upright position within the recliner. however patient is alert, oriented and able to provide informed consent       
Bedside Mobility Assessment Tool (BMAT):     Assessment Level 1- Sit and Shake    1. From a semi-reclined position, ask patient to sit up and rotate to a seated position at the side of the bed. Can use the bedrail.    2. Ask patient to reach out and grab your hand and shake making sure patient reaches across his/her midline.   Pass- Patient is able to come to a seated position, maintain core strength. Maintains seated balance while reaching across midline. Move on to Assessment Level 2.     Assessment Level 2- Stretch and Point   1. With patient in seated position at the side of the bed, have patient place both feet on the floor (or stool) with knees no higher than hips.    2. Ask patient to stretch one leg and straighten the knee, then bend the ankle/flex and point the toes. If appropriate, repeat with the other leg.   Pass- Patient is able to demonstrate appropriate quad strength on intended weight bearing limb(s). Move onto Assessment Level 3.     Assessment Level 3- Stand   1. Ask patient to elevate off the bed or chair (seated to standing) using an assistive device (cane, bedrail).    2. Patient should be able to raise buttocks off be and hold for a count of five. May repeat once.   Fail- Patient unable to demonstrate standing stability. Patient is MOBILITY LEVEL 3.     Assessment Level 4- Walk   1. Ask patient to march in place at bedside.    2. Then ask patient to advance step and return each foot. Some medical conditions may render a patient from stepping backwards, use your best clinical judgement.   Fail- Patient not able to complete tasks OR requires use of assistive device. Patient is MOBILITY LEVEL 3.       Mobility Level- 2   
Bedside report given to Brittney WOODS. Pt denies needs at this time. No s/s of distress. Respirations easy and unlabored. Pt stable. Bed in low position call light within reach. No further needs at this time.    
Comprehensive Nutrition Assessment    Type and Reason for Visit:  Initial, Positive nutrition screen (MST 5)    Nutrition Recommendations/Plan:   Continue Minced and moist diet w/ 1800 ML FR  Modify Supplement to Ensure MAX 1 x daily      Malnutrition Assessment:  Malnutrition Status:  At risk for malnutrition (01/08/25 1930)    Context:  Acute Illness     Findings of the 6 clinical characteristics of malnutrition:  Energy Intake:  No decrease in energy intake  Weight Loss:  No weight loss     Body Fat Loss:  Unable to assess     Muscle Mass Loss:  Unable to assess    Fluid Accumulation:  Unable to assess      Nutrition Assessment:    Pt. nutritionally compromised AEB admitted with hematemesis and low WBC c/of sig wt loss and not eating at this point wt loss is < 6% since September 2024 and has been stable in last 30 days since starting HD and intakes are > 75%..  At risk for further nutrition compromise r/t dysphagia from trach and  altered nutrition related labs .  Will continue minced and moist with 1800 cc FR and change supplement to Ensure MAX 1 x daily     Nutrition Related Findings:    pt was out of room at time of RD visit in HD; + thoracentesis 1/7; h/o of TRACH d/t laryngel cancer; low Na; Low WBC; Wound Type: None       Current Nutrition Intake & Therapies:    Average Meal Intake: %  Average Supplements Intake: None Ordered  ADULT ORAL NUTRITION SUPPLEMENT; Breakfast, Lunch, Dinner; Standard High Calorie/High Protein Oral Supplement  ADULT DIET; Dysphagia - Minced and Moist; 1800 ml; No Chocolate; crushed meds in pudding    Anthropometric Measures:  Height: 165.1 cm (5' 5\")  Ideal Body Weight (IBW): 125 lbs (57 kg)    Admission Body Weight: 72.1 kg (159 lb)  Current Body Weight: 72.1 kg (159 lb), 127.2 % IBW. Weight Source: Bed scale  Current BMI (kg/m2): 26.5  Usual Body Weight: 76.2 kg (168 lb) (sept 2024)     % Weight Change (Calculated): -5.4                    BMI Categories: Overweight (BMI 
Department of Internal Medicine  Nephrology Progress Note        SUBJECTIVE:    We are following this patient for ESRD management.    Resting  HD on 1/50 with 1 L net UF, postweight 64.7 kg  HD on 1/13 with 1 L net UF, postweight 64.5 kg  TDC exchanged 1/13    ROS: No fever or chills.  Social: No family at bedside.    Physical Exam:    VITALS:  /71   Pulse 75   Temp 98.1 °F (36.7 °C) (Oral)   Resp 18   Ht 1.651 m (5' 5\")   Wt 73.5 kg (162 lb 0.6 oz)   SpO2 97%   BMI 26.96 kg/m²     General appearance: Seems comfortable, no acute distress.  Neck: Trachea midline, thyroid normal.   Lungs:  Non labored breathing, CTA to anterior auscultation.  Heart:  S1S2 normal, rub or gallop. No peripheral edema.  Abdomen: Soft, non-tender, no organomegaly.   Skin: No lesions or rashes, warm to touch.     DATA:    CBC with Differential:    Lab Results   Component Value Date/Time    WBC 3.5 01/14/2025 05:52 AM    RBC 2.52 01/14/2025 05:52 AM    HGB 7.7 01/14/2025 05:52 AM    HCT 23.0 01/14/2025 05:52 AM     01/14/2025 05:52 AM    MCV 91.2 01/14/2025 05:52 AM    MCH 30.7 01/14/2025 05:52 AM    MCHC 33.7 01/14/2025 05:52 AM    RDW 18.4 01/14/2025 05:52 AM    LYMPHOPCT 4.9 01/14/2025 05:52 AM    MONOPCT 7.3 01/14/2025 05:52 AM    EOSPCT 1.0 01/14/2025 05:52 AM    BASOPCT 0.2 01/14/2025 05:52 AM    MONOSABS 0.3 01/14/2025 05:52 AM    LYMPHSABS 0.2 01/14/2025 05:52 AM    EOSABS 0.0 01/14/2025 05:52 AM    BASOSABS 0.0 01/14/2025 05:52 AM     BMP:    Lab Results   Component Value Date/Time     01/15/2025 06:20 AM    K 3.7 01/15/2025 06:20 AM    K 3.7 01/14/2025 05:52 AM    CL 94 01/15/2025 06:20 AM    CO2 28 01/15/2025 06:20 AM    BUN 29 01/15/2025 06:20 AM    CREATININE 2.0 01/15/2025 06:20 AM    CALCIUM 7.6 01/15/2025 06:20 AM    LABGLOM 27 01/15/2025 06:20 AM    GLUCOSE 68 01/15/2025 06:20 AM       IMPRESSION/RECOMMENDATIONS:      1- ESRD: Newly diagnosed secondary to ANCA vasculitis currently on Prednisone 60 
Department of Internal Medicine  Nephrology Progress Note        SUBJECTIVE:    We are following this patient for ESRD management.  The patient was seen and examined at [facility] exchanged 1/13    ROS: No fever or chills.  Social: No family at bedside.    Physical Exam:    VITALS:  /77   Pulse 68   Temp 97.5 °F (36.4 °C) (Oral)   Resp 18   Ht 1.651 m (5' 5\")   Wt 73.5 kg (162 lb 0.6 oz)   SpO2 100%   BMI 26.96 kg/m²     General appearance: Seems comfortable, no acute distress.  Neck: Trachea midline, thyroid normal.   Lungs:  Non labored breathing, CTA to anterior auscultation.  Heart:  S1S2 normal, rub or gallop. No peripheral edema.  Abdomen: Soft, non-tender, no organomegaly.   Skin: No lesions or rashes, warm to touch.     DATA:    CBC with Differential:    Lab Results   Component Value Date/Time    WBC 3.6 01/13/2025 08:17 AM    RBC 2.48 01/13/2025 08:17 AM    HGB 7.6 01/13/2025 08:17 AM    HCT 22.7 01/13/2025 08:17 AM     01/13/2025 08:17 AM    MCV 91.8 01/13/2025 08:17 AM    MCH 30.6 01/13/2025 08:17 AM    MCHC 33.3 01/13/2025 08:17 AM    RDW 18.1 01/13/2025 08:17 AM    LYMPHOPCT 6.2 01/13/2025 08:17 AM    MONOPCT 9.6 01/13/2025 08:17 AM    EOSPCT 0.9 01/13/2025 08:17 AM    BASOPCT 0.7 01/13/2025 08:17 AM    MONOSABS 0.3 01/13/2025 08:17 AM    LYMPHSABS 0.2 01/13/2025 08:17 AM    EOSABS 0.0 01/13/2025 08:17 AM    BASOSABS 0.0 01/13/2025 08:17 AM     BMP:    Lab Results   Component Value Date/Time     01/13/2025 08:17 AM    K 4.1 01/13/2025 08:17 AM    CL 96 01/13/2025 08:17 AM    CO2 27 01/13/2025 08:17 AM    BUN 46 01/13/2025 08:17 AM    CREATININE 2.6 01/13/2025 08:17 AM    CALCIUM 7.9 01/13/2025 08:17 AM    LABGLOM 19 01/13/2025 08:17 AM    GLUCOSE 69 01/13/2025 08:17 AM       IMPRESSION/RECOMMENDATIONS:      1- ESRD: Newly diagnosed secondary to ANCA vasculitis currently on Prednisone 60 mg daily (started on 12/19), Bactrim DS, and s/p first dose of Rituximab on 12/19; we will 
Department of Internal Medicine  Nephrology Progress Note        SUBJECTIVE:    We are following this patient for ESRD management.  The patient was seen and examined; she feels well today with no CP, SOB, nausea or vomiting.    ROS: No fever or chills.  Social: No family at bedside.    Physical Exam:    VITALS:  /61   Pulse 80   Temp 98.1 °F (36.7 °C) (Oral)   Resp 16   Ht 1.651 m (5' 5\")   Wt 73.5 kg (162 lb 0.6 oz)   SpO2 97%   BMI 26.96 kg/m²     General appearance: Seems comfortable, no acute distress.  Neck: Trachea midline, thyroid normal.   Lungs:  Non labored breathing, CTA to anterior auscultation.  Heart:  S1S2 normal, rub or gallop. No peripheral edema.  Abdomen: Soft, non-tender, no organomegaly.   Skin: No lesions or rashes, warm to touch.     DATA:    CBC with Differential:    Lab Results   Component Value Date/Time    WBC 3.3 01/09/2025 05:27 AM    RBC 2.54 01/09/2025 05:27 AM    HGB 7.2 01/09/2025 07:33 PM    HCT 21.5 01/09/2025 07:33 PM     01/09/2025 05:27 AM    MCV 90.4 01/09/2025 05:27 AM    MCH 30.6 01/09/2025 05:27 AM    MCHC 33.8 01/09/2025 05:27 AM    RDW 18.1 01/09/2025 05:27 AM    LYMPHOPCT 3.4 01/09/2025 05:27 AM    MONOPCT 3.3 01/09/2025 05:27 AM    EOSPCT 0.0 01/09/2025 05:27 AM    BASOPCT 0.3 01/09/2025 05:27 AM    MONOSABS 0.1 01/09/2025 05:27 AM    LYMPHSABS 0.1 01/09/2025 05:27 AM    EOSABS 0.0 01/09/2025 05:27 AM    BASOSABS 0.0 01/09/2025 05:27 AM     BMP:    Lab Results   Component Value Date/Time     01/09/2025 05:27 AM    K 4.1 01/09/2025 05:27 AM    CL 95 01/09/2025 05:27 AM    CO2 30 01/09/2025 05:27 AM    BUN 36 01/09/2025 05:27 AM    CREATININE 2.4 01/09/2025 05:27 AM    CALCIUM 7.8 01/09/2025 05:27 AM    LABGLOM 21 01/09/2025 05:27 AM    GLUCOSE 103 01/09/2025 05:27 AM       IMPRESSION/RECOMMENDATIONS:      1- ESRD: Newly diagnosed secondary to ANCA vasculitis currently on Prednisone 60 mg daily (started on 12/19), Bactrim DS, and s/p first dose of 
Department of Internal Medicine  Nephrology Progress Note        SUBJECTIVE:    We are following this patient for ESRD management.  The patient was seen and examined; she feels well today with no CP, SOB, nausea or vomiting.    ROS: No fever or chills.  Social: No family at bedside.    Physical Exam:    VITALS:  /74   Pulse 86   Temp 97.6 °F (36.4 °C) (Axillary)   Resp 16   Ht 1.651 m (5' 5\")   Wt 73.5 kg (162 lb 0.6 oz)   SpO2 95%   BMI 26.96 kg/m²     General appearance: Seems comfortable, no acute distress.  Neck: Trachea midline, thyroid normal.   Lungs:  Non labored breathing, CTA to anterior auscultation.  Heart:  S1S2 normal, rub or gallop. No peripheral edema.  Abdomen: Soft, non-tender, no organomegaly.   Skin: No lesions or rashes, warm to touch.     DATA:    CBC with Differential:    Lab Results   Component Value Date/Time    WBC 3.3 01/09/2025 05:27 AM    RBC 2.54 01/09/2025 05:27 AM    HGB 7.2 01/09/2025 07:33 PM    HCT 21.5 01/09/2025 07:33 PM     01/09/2025 05:27 AM    MCV 90.4 01/09/2025 05:27 AM    MCH 30.6 01/09/2025 05:27 AM    MCHC 33.8 01/09/2025 05:27 AM    RDW 18.1 01/09/2025 05:27 AM    LYMPHOPCT 3.4 01/09/2025 05:27 AM    MONOPCT 3.3 01/09/2025 05:27 AM    EOSPCT 0.0 01/09/2025 05:27 AM    BASOPCT 0.3 01/09/2025 05:27 AM    MONOSABS 0.1 01/09/2025 05:27 AM    LYMPHSABS 0.1 01/09/2025 05:27 AM    EOSABS 0.0 01/09/2025 05:27 AM    BASOSABS 0.0 01/09/2025 05:27 AM     BMP:    Lab Results   Component Value Date/Time     01/09/2025 05:27 AM    K 4.1 01/09/2025 05:27 AM    CL 95 01/09/2025 05:27 AM    CO2 30 01/09/2025 05:27 AM    BUN 36 01/09/2025 05:27 AM    CREATININE 2.4 01/09/2025 05:27 AM    CALCIUM 7.8 01/09/2025 05:27 AM    LABGLOM 21 01/09/2025 05:27 AM    GLUCOSE 103 01/09/2025 05:27 AM       IMPRESSION/RECOMMENDATIONS:      1- ESRD: Newly diagnosed secondary to ANCA vasculitis currently on Prednisone 60 mg daily (started on 12/19), Bactrim DS, and s/p first 
Department of Internal Medicine  Nephrology Progress Note        SUBJECTIVE:    We are following this patient for ESRD management.  The patient was seen and examined; she feels well today with no CP, SOB, nausea or vomiting.    ROS: No fever or chills.  Social: No family at bedside.    Physical Exam:    VITALS:  /82   Pulse 86   Temp 97.5 °F (36.4 °C) (Oral)   Resp 18   Ht 1.651 m (5' 5\")   Wt 73.5 kg (162 lb 0.6 oz)   SpO2 100%   BMI 26.96 kg/m²     General appearance: Seems comfortable, no acute distress.  Neck: Trachea midline, thyroid normal.   Lungs:  Non labored breathing, CTA to anterior auscultation.  Heart:  S1S2 normal, rub or gallop. No peripheral edema.  Abdomen: Soft, non-tender, no organomegaly.   Skin: No lesions or rashes, warm to touch.     DATA:    CBC with Differential:    Lab Results   Component Value Date/Time    WBC 3.3 01/09/2025 05:27 AM    RBC 2.54 01/09/2025 05:27 AM    HGB 7.8 01/09/2025 05:27 AM    HCT 22.9 01/09/2025 05:27 AM     01/09/2025 05:27 AM    MCV 90.4 01/09/2025 05:27 AM    MCH 30.6 01/09/2025 05:27 AM    MCHC 33.8 01/09/2025 05:27 AM    RDW 18.1 01/09/2025 05:27 AM    LYMPHOPCT 3.4 01/09/2025 05:27 AM    MONOPCT 3.3 01/09/2025 05:27 AM    EOSPCT 0.0 01/09/2025 05:27 AM    BASOPCT 0.3 01/09/2025 05:27 AM    MONOSABS 0.1 01/09/2025 05:27 AM    LYMPHSABS 0.1 01/09/2025 05:27 AM    EOSABS 0.0 01/09/2025 05:27 AM    BASOSABS 0.0 01/09/2025 05:27 AM     BMP:    Lab Results   Component Value Date/Time     01/09/2025 05:27 AM    K 4.1 01/09/2025 05:27 AM    CL 95 01/09/2025 05:27 AM    CO2 30 01/09/2025 05:27 AM    BUN 36 01/09/2025 05:27 AM    CREATININE 2.4 01/09/2025 05:27 AM    CALCIUM 7.8 01/09/2025 05:27 AM    LABGLOM 21 01/09/2025 05:27 AM    GLUCOSE 103 01/09/2025 05:27 AM       IMPRESSION/RECOMMENDATIONS:      1- ESRD: Newly diagnosed secondary to ANCA vasculitis currently on Prednisone 60 mg daily (started on 12/19), Bactrim DS, and s/p first dose 
Department of Internal Medicine  Nephrology Progress Note        SUBJECTIVE:    We are following this patient for ESRD management.  The patient was seen and examined; she feels well today with no CP, SOB, nausea or vomiting.    ROS: No fever or chills.  Social: No family at bedside.    Physical Exam:    VITALS:  /89   Pulse 86   Temp 97.3 °F (36.3 °C) (Oral)   Resp 18   Ht 1.651 m (5' 5\")   Wt 73.5 kg (162 lb 0.6 oz)   SpO2 99%   BMI 26.96 kg/m²     General appearance: Seems comfortable, no acute distress.  Neck: Trachea midline, thyroid normal.   Lungs:  Non labored breathing, CTA to anterior auscultation.  Heart:  S1S2 normal, rub or gallop. No peripheral edema.  Abdomen: Soft, non-tender, no organomegaly.   Skin: No lesions or rashes, warm to touch.     DATA:    CBC with Differential:    Lab Results   Component Value Date/Time    WBC 3.3 01/09/2025 05:27 AM    RBC 2.54 01/09/2025 05:27 AM    HGB 7.2 01/09/2025 07:33 PM    HCT 21.5 01/09/2025 07:33 PM     01/09/2025 05:27 AM    MCV 90.4 01/09/2025 05:27 AM    MCH 30.6 01/09/2025 05:27 AM    MCHC 33.8 01/09/2025 05:27 AM    RDW 18.1 01/09/2025 05:27 AM    LYMPHOPCT 3.4 01/09/2025 05:27 AM    MONOPCT 3.3 01/09/2025 05:27 AM    EOSPCT 0.0 01/09/2025 05:27 AM    BASOPCT 0.3 01/09/2025 05:27 AM    MONOSABS 0.1 01/09/2025 05:27 AM    LYMPHSABS 0.1 01/09/2025 05:27 AM    EOSABS 0.0 01/09/2025 05:27 AM    BASOSABS 0.0 01/09/2025 05:27 AM     BMP:    Lab Results   Component Value Date/Time     01/09/2025 05:27 AM    K 4.1 01/09/2025 05:27 AM    CL 95 01/09/2025 05:27 AM    CO2 30 01/09/2025 05:27 AM    BUN 36 01/09/2025 05:27 AM    CREATININE 2.4 01/09/2025 05:27 AM    CALCIUM 7.8 01/09/2025 05:27 AM    LABGLOM 21 01/09/2025 05:27 AM    GLUCOSE 103 01/09/2025 05:27 AM       IMPRESSION/RECOMMENDATIONS:      1- ESRD: Newly diagnosed secondary to ANCA vasculitis currently on Prednisone 60 mg daily (started on 12/19), Bactrim DS, and s/p first dose of 
Department of Internal Medicine  Nephrology Progress Note        SUBJECTIVE:    We are following this patient for ESRD management.  The patient was seen and examined; she feels well today with no CP, SOB, nausea or vomiting.    ROS: No fever or chills.  Social: No family at bedside.    Physical Exam:    VITALS:  BP (!) 114/55   Pulse 88   Temp 97.2 °F (36.2 °C) (Oral)   Resp 18   Ht 1.651 m (5' 5\")   Wt 72.2 kg (159 lb 1.6 oz)   SpO2 100%   BMI 26.48 kg/m²     General appearance: Seems comfortable, no acute distress.  Neck: Trachea midline, thyroid normal.   Lungs:  Non labored breathing, CTA to anterior auscultation.  Heart:  S1S2 normal, rub or gallop. No peripheral edema.  Abdomen: Soft, non-tender, no organomegaly.   Skin: No lesions or rashes, warm to touch.     DATA:    CBC with Differential:    Lab Results   Component Value Date/Time    WBC 2.6 01/08/2025 04:27 AM    RBC 2.49 01/08/2025 04:27 AM    HGB 7.6 01/08/2025 04:27 AM    HCT 22.3 01/08/2025 04:27 AM     01/08/2025 04:27 AM    MCV 89.6 01/08/2025 04:27 AM    MCH 30.4 01/08/2025 04:27 AM    MCHC 33.9 01/08/2025 04:27 AM    RDW 17.2 01/08/2025 04:27 AM    LYMPHOPCT 2.5 01/08/2025 04:27 AM    MONOPCT 2.1 01/08/2025 04:27 AM    EOSPCT 0.0 01/08/2025 04:27 AM    BASOPCT 0.2 01/08/2025 04:27 AM    MONOSABS 0.1 01/08/2025 04:27 AM    LYMPHSABS 0.1 01/08/2025 04:27 AM    EOSABS 0.0 01/08/2025 04:27 AM    BASOSABS 0.0 01/08/2025 04:27 AM     BMP:    Lab Results   Component Value Date/Time     01/08/2025 04:27 AM    K 4.3 01/08/2025 04:27 AM    CL 93 01/08/2025 04:27 AM    CO2 29 01/08/2025 04:27 AM    BUN 63 01/08/2025 04:27 AM    CREATININE 3.5 01/08/2025 04:27 AM    CALCIUM 7.8 01/08/2025 04:27 AM    LABGLOM 14 01/08/2025 04:27 AM    GLUCOSE 90 01/08/2025 04:27 AM       IMPRESSION/RECOMMENDATIONS:      1- ESRD: Newly diagnosed secondary to ANCA vasculitis currently on Prednisone 60 mg daily (started on 12/19), Bactrim DS, and s/p Rituximab 
Department of Internal Medicine  Nephrology Progress Note        SUBJECTIVE:    We are following this patient for ESRD management.  The patient was seen and examined; she feels well today with no CP, SOB, nausea or vomiting.    ROS: No fever or chills.  Social: No family at bedside.    Physical Exam:    VITALS:  BP (!) 143/85   Pulse 81   Temp 97.5 °F (36.4 °C) (Oral)   Resp 18   Ht 1.651 m (5' 5\")   Wt 72.2 kg (159 lb 1.6 oz)   SpO2 99%   BMI 26.48 kg/m²     General appearance: Seems comfortable, no acute distress.  Neck: Trachea midline, thyroid normal.   Lungs:  Non labored breathing, CTA to anterior auscultation.  Heart:  S1S2 normal, rub or gallop. No peripheral edema.  Abdomen: Soft, non-tender, no organomegaly.   Skin: No lesions or rashes, warm to touch.     DATA:    CBC with Differential:    Lab Results   Component Value Date/Time    WBC 3.1 01/07/2025 06:26 AM    RBC 2.35 01/07/2025 06:26 AM    HGB 7.2 01/07/2025 06:26 AM    HCT 21.2 01/07/2025 06:26 AM     01/07/2025 06:26 AM    MCV 89.9 01/07/2025 06:26 AM    MCH 30.8 01/07/2025 06:26 AM    MCHC 34.2 01/07/2025 06:26 AM    RDW 17.0 01/07/2025 06:26 AM    LYMPHOPCT 4.9 01/07/2025 06:26 AM    MONOPCT 6.2 01/07/2025 06:26 AM    EOSPCT 0.7 01/07/2025 06:26 AM    BASOPCT 0.4 01/07/2025 06:26 AM    MONOSABS 0.2 01/07/2025 06:26 AM    LYMPHSABS 0.2 01/07/2025 06:26 AM    EOSABS 0.0 01/07/2025 06:26 AM    BASOSABS 0.0 01/07/2025 06:26 AM     BMP:    Lab Results   Component Value Date/Time     01/07/2025 06:26 AM    K 3.9 01/07/2025 06:26 AM    CL 95 01/07/2025 06:26 AM    CO2 31 01/07/2025 06:26 AM    BUN 51 01/07/2025 06:26 AM    CREATININE 3.2 01/07/2025 06:26 AM    CALCIUM 7.7 01/07/2025 06:26 AM    LABGLOM 15 01/07/2025 06:26 AM    GLUCOSE 70 01/07/2025 06:26 AM       IMPRESSION/RECOMMENDATIONS:      1- ESRD: Newly diagnosed secondary to ANCA vasculitis currently on Prednisone 60 mg daily, Bactrim DS, and s/p Rituximab on 12/19; we will 
Dr Gould made aware of positive blood cultures   
General surgery consult called to Dr. Masters on 1/11/25. Spoke to Enio Cruz  
Hemoglobin 6.7, down from 7.4 OA, active intermittent hemoptysis. 1 unit PRBC ordered. Verify informed consent. H&H 1 hour post transfusion.    Fior Reynoso PA-C 1/6/2025 6:23 PM   
Image guided Thoracentesis completed.  550 ml of clear red  colored withdrawn.  Pt tolerated procedure without any signs or symptoms of distress. Vital signs stable.     DISCHARGED:  ADMITTED: Pt transferred back to room 230. Report called to nurse.     SPECIMEN SENT:  Yes    Vital Signs  Vitals:    01/07/25 1452   BP: 134/89   Pulse: 73   Resp: 18   Temp: 97.2 °F (36.2 °C)   SpO2: 99%    (vital signs in table format)   
Inpatient Occupational Therapy Evaluation & Treatment    Unit: [facility]  Date:  1/8/2025  Patient Name:    Emily Alegre  Admitting diagnosis:  Hematemesis [K92.0]  ESRD on dialysis (HCC) [N18.6, Z99.2]  Hematemesis with nausea [K92.0]  Pneumonia of both lungs due to infectious organism, unspecified part of lung [J18.9]  Admit Date:  1/6/2025  Precautions/Restrictions/WB Status/ Lines/ Wounds/ Oxygen: Fall risk, Bed/chair alarm, Lines (IV, Supplemental O2 (4L), external catheter, and IJ right), Telemetry, and Continuous pulse oximetry    Pt seen for cotreatment this date due to patient endurance, acute illness/injury, and need for the assistance of 2 skilled therapists    Treatment Time:  11:40-12:30  Treatment Number:  1  Timed Code Treatment Minutes: 40 minutes  Total Treatment Minutes:  50  minutes    Patient Goals for Therapy: \"to get stronger\"          Discharge Recommendations: SNF  DME needs for discharge: Defer to facility       Therapy recommendations for staff:   Assist of 2 for transfers with use of JOVANI STEDY to/from chair    History of Present Illness: 69 y.o. female with a PMHx of chronic respiratory failure, ANCA vasculitis, stage 4 laryngeal cancer, anemia who presents to [facility] with hemoptysis.     Preadmission Environment: from previous admission. Patient discharged to SNF ~2 weeks ago.   Pt lives with                                         with family (niece)  Home environment:                            two story home; pt stays on the main floor  Steps to enter first floor:                     2 steps to enter; no handrails  Steps to second floor/basement:        N/A  Laundry:                                              1st floor  Bathroom:                                           tub/shower unit and standard height toilet  Pt sleeps in a:                                     Recliner, Couch  Equipment owned:                              RW and home O2 (2L) continous     Preadmission 
Inpatient Physical Therapy Evaluation & Treatment    Unit: [facility]  Date:  1/8/2025  Patient Name:    Emily Alegre  Admitting diagnosis:  Hematemesis [K92.0]  ESRD on dialysis (HCC) [N18.6, Z99.2]  Hematemesis with nausea [K92.0]  Pneumonia of both lungs due to infectious organism, unspecified part of lung [J18.9]  Admit Date:  1/6/2025  Precautions/Restrictions/WB Status/ Lines/ Wounds/ Oxygen: Fall risk, Bed/chair alarm, Lines (IV, Supplemental O2 (4L), external catheter, and IJ right), Telemetry, and Continuous pulse oximetry; dialysis      Pt seen for cotreatment this date due to patient safety, patient endurance, complexity of condition, acute illness/injury, and staff recommendation    Treatment Time:  9098-8368  Treatment Number:  1   Timed Code Treatment Minutes: 39 minutes  Total Treatment Minutes:  49  minutes    Patient Stated Goals for Therapy: \" To get up \"          Discharge Recommendations: SNF  DME needs for discharge: Defer to facility       Therapy recommendation for EMS Transport: requires transport by cot due to pt needs lift equipment for safe transfers, pt needs A x 2 for safe transfers, and pt with poor sitting balance/tolerance    Therapy recommendations for staff:   Assist of 2 for transfers with use of JOVANI STEDY and gait belt to/from chair    History of Present Illness:   Per H&P on 1/6/25 from Fior Reynoso PA-C:    \"The patient is a 69 y.o. female with a PMHx of chronic respiratory failure, ANCA vasculitis, stage 4 laryngeal cancer, anemia who presents to [facility] with hemoptysis. History obtained from the patient and review of EMR. Patient reports that she has been having ongoing hemoptysis for the last couple of months. ED reported that patient reported multiple episodes of hematemesis, however, she denies this to me. Reports she coughs up bright red blood almost every time she coughs. Has chronic SOB, on 4L O2. Has some nausea, no vomiting. Denies any known fevers or chest 
Inpatient Physical Therapy Treatment    Unit: [facility]  Date:  1/12/2025  Patient Name:    Emily Alegre  Admitting diagnosis:  Hematemesis [K92.0]  ESRD on dialysis (HCC) [N18.6, Z99.2]  Hematemesis with nausea [K92.0]  Pneumonia of both lungs due to infectious organism, unspecified part of lung [J18.9]  Admit Date:  1/6/2025  Precautions/Restrictions/WB Status/ Lines/ Wounds/ Oxygen: Fall risk, Bed/chair alarm, Lines (IV, Supplemental O2 (4L), external catheter, and Port right), Telemetry, and Continuous pulse oximetry; dialysis    Pt seen for cotreatment this date due to patient safety, patient endurance, complexity of condition, acute illness/injury, and staff recommendation    Treatment Time: 11:35-12-25  Treatment Number:  2   Timed Code Treatment Minutes: 50 minutes  Total Treatment Minutes:  50  minutes    Patient Stated Goals for Therapy: not stated          Discharge Recommendations: SNF  DME needs for discharge: Defer to facility       Therapy recommendation for EMS Transport: requires transport by cot due to pt needs lift equipment for safe transfers, pt needs A x 2 for safe transfers, and pt with poor sitting balance/tolerance    Therapy recommendations for staff:   Assist of 2 for transfers with use of MAXI-Move to/from chair    History of Present Illness:   Per H&P on 1/6/25 from Fior Reynoso PA-C:  \"The patient is a 69 y.o. female with a PMHx of chronic respiratory failure, ANCA vasculitis, stage 4 laryngeal cancer, anemia who presents to [facility] with hemoptysis.\"  Found to have blood cx (+) for MSSA.  Possible sources have been either her existing HD catheter, or her portacath.     Preadmission Environment: Information obtained from chart, pt indicates no changes since previous admission however pt has been at SNF since previous admit to [facility].  Pt lives with                                         with family (niece)  Home environment:                            two story home; pt stays 
Inpatient Physical Therapy Treatment    Unit: [facility]  Date:  1/16/2025  Patient Name:    Emily Alegre  Admitting diagnosis:  Hematemesis [K92.0]  ESRD on dialysis (HCC) [N18.6, Z99.2]  Hematemesis with nausea [K92.0]  Pneumonia of both lungs due to infectious organism, unspecified part of lung [J18.9]  Admit Date:  1/6/2025  Precautions/Restrictions/WB Status/ Lines/ Wounds/ Oxygen: Fall risk, Bed/chair alarm, Lines (IV, Supplemental O2 (4L), external catheter, and R neck HD central access), Telemetry, Continuous pulse oximetry, and dialysis    Pt seen for cotreatment this date due to patient safety, acute illness/injury, staff recommendation, and need for the assistance of 2 skilled therapists    Treatment Time:  09:30-10:00  Treatment Number:  4   Timed Code Treatment Minutes: 30 minutes  Total Treatment Minutes:  30 minutes    Patient Stated Goals for Therapy: No goals stated.          Discharge Recommendations: SNF  DME needs for discharge: Defer to facility       Therapy recommendation for EMS Transport: requires transport by cot due to pt needs lift equipment for safe transfers, pt needs A x 2 for safe transfers, and pt with poor sitting balance/tolerance    Therapy recommendations for staff:   Assist of 2 for transfers with use of MAXI-Move to/from chair    History of Present Illness:   Per H&P on 1/6/25 from Fior Reynoso PA-C:  \"The patient is a 69 y.o. female with a PMHx of chronic respiratory failure, ANCA vasculitis, stage 4 laryngeal cancer, anemia who presents to [facility] with hemoptysis.\"  Found to have blood cx (+) for MSSA.  Possible sources have been either her existing HD catheter, or her portacath.      Preadmission Environment: Information obtained from chart, pt indicates no changes since previous admission however pt has been at SNF since previous admit to [facility].  Pt lives with                                         with family (niece)  Home environment:                            
Inpatient Physical Therapy Treatment    Unit: [facility]  Date:  1/14/2025  Patient Name:    Emily Alegre  Admitting diagnosis:  Hematemesis [K92.0]  ESRD on dialysis (HCC) [N18.6, Z99.2]  Hematemesis with nausea [K92.0]  Pneumonia of both lungs due to infectious organism, unspecified part of lung [J18.9]  Admit Date:  1/6/2025  Precautions/Restrictions/WB Status/ Lines/ Wounds/ Oxygen: Fall risk, Bed/chair alarm, Lines (IV, Supplemental O2 (4L), external catheter, and R neck HD central access), Telemetry, Continuous pulse oximetry, and dialysis      Pt seen for cotreatment this date due to patient safety, acute illness/injury, staff recommendation, and need for the assistance of 2 skilled therapists    Treatment Time:  3054-0745  Treatment Number:  3   Timed Code Treatment Minutes: 30 minutes  Total Treatment Minutes:  30 minutes    Patient Stated Goals for Therapy: \" No goals stated.          Discharge Recommendations: SNF  DME needs for discharge: Defer to facility       Therapy recommendation for EMS Transport: requires transport by cot due to pt needs lift equipment for safe transfers, pt needs A x 2 for safe transfers, and pt with poor sitting balance/tolerance    Therapy recommendations for staff:   Assist of 2 for transfers with use of MAXI-Move to/from chair    History of Present Illness:   Per H&P on 1/6/25 from Fior Reynoso PA-C:  \"The patient is a 69 y.o. female with a PMHx of chronic respiratory failure, ANCA vasculitis, stage 4 laryngeal cancer, anemia who presents to [facility] with hemoptysis.\"  Found to have blood cx (+) for MSSA.  Possible sources have been either her existing HD catheter, or her portacath.      Preadmission Environment: Information obtained from chart, pt indicates no changes since previous admission however pt has been at SNF since previous admit to [facility].  Pt lives with                                         with family (niece)  Home environment:                           
Nutrition Assessment     Type and Reason for Visit: Reassess    Nutrition Recommendations/Plan:   Continue Regular Easy to chew diet  Contiue Nepro 1 x daily     Malnutrition Assessment:  Malnutrition Status: At risk for malnutrition    Nutrition Assessment:  Pt improving from a nutritional standpoint AEB accepting meals and supplements and weight is remsinng stable.  Remains at risk for further nutritional compromise r/t CKD with HD and altered nutrition related labs .  Will continue diet and supps as ordered      Estimated Daily Nutrient Needs:  Energy (kcal):  0790-0909 based ~ 20-23 kcal/kg cbw Weight Used for Energy Requirements: Current     Protein (g):  86 based 1.2 gr/kg cbw Weight Used for Protein Requirements: Admission        Fluid (ml/day):  5944-4034 Method Used for Fluid Requirements: 1 ml/kcal    Nutrition Related Findings:   pt was A & O accepting > 25% of the meals she gets to eat statyed she is frequently out of her room atthe time of melas and misses the melas ; accepting the Nepro; Na low, Ca low, Phos High; H/H low; HD 3 x weekly Wound Type: None    Current Nutrition Therapies:    ADULT ORAL NUTRITION SUPPLEMENT; Breakfast, Lunch, Dinner; Renal Oral Supplement  ADULT DIET; Easy to Chew; crushed meds in pudding; Grilled cheese and Cream of Tomato Soup for Lunch    Anthropometric Measures:  Height: 165.1 cm (5' 5\")  Current Body Wt: 73.5 kg (162 lb)   BMI: 27        Nutrition Diagnosis:   No nutrition diagnosis at this time related to   as evidenced by      Nutrition Interventions:   Food and/or Nutrient Delivery: Continue NPO, Continue Oral Nutrition Supplement  Nutrition Education/Counseling: No recommendation at this time  Coordination of Nutrition Care: No recommendation at this time       Goals:  Goals: Meet at least 75% of estimated needs  Type of Goal: Continue current goal  Previous Goal Met: Progressing toward Goal(s)    Nutrition Monitoring and Evaluation:   Behavioral-Environmental 
Occupational/Physical Therapy  Orders received, chart reviewed. Patient held per RN request due to status with additional testing pending. Will reattempt 1/8/24 as patient status and therapy schedules allow.    Adrianne Del Rosario, OTR/L [id number]  Aurelio Alford PT, DPT [id number]      
Patient discharging to [facility] accompanied by 2 paramedics via stretcher. No pain and or discomfort noted and or reported. Patient has no new skin issues noted and or reported. See 4 eyes.  
Patient off the floor for HD  
Patient pulmonary Progress Note  CC: Hemoptysis    Subjective: No further reports of hemoptysis other than pink-tinged to her sputum once  EXAM: /66   Pulse 73   Temp 97.7 °F (36.5 °C) (Oral)   Resp 20   Ht 1.651 m (5' 5\")   Wt 73.5 kg (162 lb 0.6 oz)   SpO2 100%   BMI 26.96 kg/m²  on 4L  Constitutional:  No acute distress.   Eyes: PERRL. Conjunctivae anicteric.   ENT: Normal nose. Normal tongue.    Neck:  Trachea is midline.   Respiratory: No accessory muscle usage.   decreased breath sounds. No wheezes. No rales. No Rhonchi.  Cardiovascular: Normal S1S2. No digit clubbing. No digit cyanosis. No LE edema.   Psychiatric: No anxiety or Agitation. Alert and Oriented to person, place and time.    Scheduled Meds:   pantoprazole  40 mg Oral QAM AC    predniSONE  60 mg Oral Daily    ceFAZolin  1,000 mg IntraVENous Q24H    polyethylene glycol  17 g Oral BID    epoetin vickey-epbx  10,000 Units IntraVENous Once per day on Monday Wednesday Friday    sodium chloride flush  5-40 mL IntraVENous 2 times per day    folic acid  1 mg Oral Daily    sulfamethoxazole-trimethoprim  0.5 tablet Oral Once per day on Monday Wednesday Friday    HYDROcodone-acetaminophen  1 tablet Oral TID    traZODone  25 mg Oral Nightly    busPIRone  10 mg Oral TID    ipratropium 0.5 mg-albuterol 2.5 mg  1 Dose Inhalation BID RT     Continuous Infusions:   sodium chloride      sodium chloride       PRN Meds:  sodium chloride flush, sodium chloride, ondansetron **OR** ondansetron, albuterol sulfate HFA, benzonatate, HYDROcodone-acetaminophen, midodrine, sodium chloride    Labs:  CBC:   Recent Labs     01/08/25  0427 01/08/25  1307 01/09/25  0527 01/09/25  1500 01/09/25  1933   WBC 2.6*  --  3.3*  --   --    HGB 7.6*   < > 7.8* 7.3* 7.2*   HCT 22.3*   < > 22.9* 22.1* 21.5*   MCV 89.6  --  90.4  --   --      --  160  --   --     < > = values in this interval not displayed.     BMP:   Recent Labs     01/08/25  0427 01/09/25  0527   * 
Patient resting in bed. Attempted to draw blood from Rt chest port. Flushed well several times, flashes of blood noted, not able to pull a sufficient amount of blood for the tubes. Day shift CHUCK Woodson notified. Called lab informing, sending a tech to the floor to draw for morning labs. No other needs noted at this time. Bed alarm in place. Call light and bedside table within reach.   
Patient still in dialysis. Report given to CHUCK Harvey.   
Per Dr. Mckeon, patient's removal of port will be done in Interventional Radiology when the dialysis catheter will be exchanged. Janis from IR to see patient, CMU called and patient is on their monitor.    
Perfect Serve Message to Lisa Horner and Low. Are you ok for patient to resume Minced and moist, thin liquids Diet and supplements.  
Prior to receiving transfusion of blood products, patient educated on the following:    Blood product transfusion process  Benefits of receiving blood product transfusion  Risks associated with receiving the transfusion  Signs and symptoms of complications associated with blood product transfusion  Vague, uneasy feeling  Onset of pain (especially at the IV site, back, or chest)  Chills  Flushing  Fever  Nausea  Dizziness  Rash  Itching  Dark or red urine  Instructed patient to notify RN immediately if any sign or symptom occurs       Informed consent received and in paper chart.   VSS.  Blood administration started at 1850.   
Progress Note    Admit Date:  1/6/2025    Subjective:  Ms. Alegre admitted with hemoptysis    She was admitted for hematemesis but denies this. She has FELISA due to c ANCA vasculitis and was discharged on PO Prednisone after a prolonged hospital stay. She also received a dose of Rituxan when she was in the hospital. No fever or chills. She had a bronchoscopy during her last hospital visit.    1/8- Blood culture positive for MSSA bacteremia. Had thoracentesis done and 500 cc of fluid removed.    1/13-  pt had right chest port and tunneled HD catheter removed with new HD Line placement     1/16-MS Alegre seen up in bed, having more BM and wishes to stop stool softners    Seen eating breakfast lying down and reports cannot sit staight as it makes her difficult to eat and breathe    Remains stable on home o2     Had HD  with no new issues   no fevers or chills  Tolerating diet    Remains weak and bed bound, reports chronic right rotator cuff issue with no new pain or swelling        Objective:   /76   Pulse 72   Temp 97.9 °F (36.6 °C) (Axillary)   Resp 18   Ht 1.651 m (5' 5\")   Wt 73.5 kg (162 lb 0.6 oz)   SpO2 98%   BMI 26.96 kg/m²        Intake/Output Summary (Last 24 hours) at 1/16/2025 0848  Last data filed at 1/15/2025 1746  Gross per 24 hour   Intake 222 ml   Output --   Net 222 ml         Physical Exam:    General appearance: alert, appears stated age and cooperative/chronically ill appearing, fatigued   Head: Normocephalic, without obvious abnormality, atraumatic  Eyes: conjunctivae/corneas clear. PERRL, EOM's intact.  Neck: no adenopathy, no carotid bruit, no JVD, supple,   Small tracheal stoma noted  symmetrical, trachea midline  Right tunneled HD catheter +  Lungs: few crackles in bases . No wheezes or rhonchi  Heart: regular rate and rhythm, S1, S2 normal, no murmur, click, rub or gallop  Abdomen: soft, no tenderness; bowel sounds normal; no masses,  no organomegaly  Extremities: extremities 
Progress Note    Admit Date:  1/6/2025    Subjective:  Ms. Alegre complain of hemoptysis.  She was admitted for hematemesis but denies this. She has FELISA due to c ANCA vasculitis and was discharged on PO Prednisone after a prolonged hospital stay. She also received a dose of Rituxan when she was in the hospital. No fever or chills. She had a bronchoscopy during her last hospital visit.    1/8- Blood culture positive for MSSA bacteremia. Had thoracentesis done and 500 cc of fluid removed.    1/9- feeling some better today.  No fever. Had HD on 1/7/25    1/10- seen after HD. Repeat BC is NGTD    1/11- complained fo hemoptysis this am but RN has not seen. Repeat blood culture is negative.      1/12- minor hemoptysis. Surgery has seen patient. Port a cath removal in am.    Objective:   /71   Pulse 82   Temp 97.6 °F (36.4 °C) (Axillary)   Resp 16   Ht 1.651 m (5' 5\")   Wt 73.5 kg (162 lb 0.6 oz)   SpO2 98%   BMI 26.96 kg/m²        Intake/Output Summary (Last 24 hours) at 1/12/2025 1140  Last data filed at 1/12/2025 0535  Gross per 24 hour   Intake 120 ml   Output 250 ml   Net -130 ml       Physical Exam:    General appearance: alert, appears stated age and cooperative/chronically ill appearing  Head: Normocephalic, without obvious abnormality, atraumatic  Eyes: conjunctivae/corneas clear. PERRL, EOM's intact.  Neck: no adenopathy, no carotid bruit, no JVD, supple, symmetrical, trachea midline and thyroid not enlarged, symmetric, no tenderness/mass/nodules  Lungs: few crackles. No wheezes or rhonchi  Heart: regular rate and rhythm, S1, S2 normal, no murmur, click, rub or gallop  Abdomen: soft, mild tenderness; bowel sounds normal; no masses,  no organomegaly  Extremities: extremities normal, atraumatic, no cyanosis or edema  Pulses: 2+ and symmetric  Skin: Skin color, texture, turgor normal. No rashes or lesions  Neurologic: Grossly normal    Scheduled Meds:   pantoprazole  40 mg Oral QAM AC    predniSONE  60 
Progress Note    Admit Date:  1/6/2025    Subjective:  Ms. Alegre complain of hemoptysis.  She was admitted for hematemesis but denies this. She has FELISA due to c ANCA vasculitis and was discharged on PO Prednisone after a prolonged hospital stay. She also received a dose of Rituxan when she was in the hospital. No fever or chills. She had a bronchoscopy during her last hospital visit.    1/8- Blood culture positive for MSSA bacteremia. Had thoracentesis done and 500 cc of fluid removed.    1/9- feeling some better today.  No fever. Had HD on 1/7/25    Objective:   /82   Pulse 86   Temp 97.5 °F (36.4 °C) (Oral)   Resp 18   Ht 1.651 m (5' 5\")   Wt 73.5 kg (162 lb 0.6 oz)   SpO2 100%   BMI 26.96 kg/m²        Intake/Output Summary (Last 24 hours) at 1/9/2025 1029  Last data filed at 1/8/2025 1655  Gross per 24 hour   Intake 222 ml   Output 100 ml   Net 122 ml       Physical Exam:    General appearance: alert, appears stated age and cooperative/chronically ill appearing  Head: Normocephalic, without obvious abnormality, atraumatic  Eyes: conjunctivae/corneas clear. PERRL, EOM's intact.  Neck: no adenopathy, no carotid bruit, no JVD, supple, symmetrical, trachea midline and thyroid not enlarged, symmetric, no tenderness/mass/nodules  Lungs: few crackles. No wheezes or rhonchi  Heart: regular rate and rhythm, S1, S2 normal, no murmur, click, rub or gallop  Abdomen: soft, mild tenderness; bowel sounds normal; no masses,  no organomegaly  Extremities: extremities normal, atraumatic, no cyanosis or edema  Pulses: 2+ and symmetric  Skin: Skin color, texture, turgor normal. No rashes or lesions  Neurologic: Grossly normal    Scheduled Meds:   ceFAZolin  1,000 mg IntraVENous Q24H    polyethylene glycol  17 g Oral BID    epoetin vickey-epbx  10,000 Units IntraVENous Once per day on Monday Wednesday Friday    methylPREDNISolone  60 mg IntraVENous Q8H    sodium chloride flush  5-40 mL IntraVENous 2 times per day    
Progress Note    Admit Date:  1/6/2025    Subjective:  Ms. Alegre complain of hemoptysis.  She was admitted for hematemesis but denies this. She has FELISA due to c ANCA vasculitis and was discharged on PO Prednisone after a prolonged hospital stay. She also received a dose of Rituxan when she was in the hospital. No fever or chills. She had a bronchoscopy during her last hospital visit.    Objective:   BP (!) 143/85   Pulse 81   Temp 97.5 °F (36.4 °C) (Oral)   Resp 18   Ht 1.651 m (5' 5\")   Wt 72.2 kg (159 lb 1.6 oz)   SpO2 99%   BMI 26.48 kg/m²        Intake/Output Summary (Last 24 hours) at 1/7/2025 0965  Last data filed at 1/7/2025 0858  Gross per 24 hour   Intake 522 ml   Output 150 ml   Net 372 ml       Physical Exam:    General appearance: alert, appears stated age and cooperative/chronically ill appearing  Head: Normocephalic, without obvious abnormality, atraumatic  Eyes: conjunctivae/corneas clear. PERRL, EOM's intact.  Neck: no adenopathy, no carotid bruit, no JVD, supple, symmetrical, trachea midline and thyroid not enlarged, symmetric, no tenderness/mass/nodules  Lungs: few crackles. No wheezes or rhonchi  Heart: regular rate and rhythm, S1, S2 normal, no murmur, click, rub or gallop  Abdomen: soft, mild tenderness; bowel sounds normal; no masses,  no organomegaly  Extremities: extremities normal, atraumatic, no cyanosis or edema  Pulses: 2+ and symmetric  Skin: Skin color, texture, turgor normal. No rashes or lesions  Neurologic: Grossly normal    Scheduled Meds:   sodium chloride flush  5-40 mL IntraVENous 2 times per day    pantoprazole (PROTONIX) 40 mg in sodium chloride (PF) 0.9 % 10 mL injection  40 mg IntraVENous Q12H    vancomycin (VANCOCIN) intermittent dosing (placeholder)   Other RX Placeholder    folic acid  1 mg Oral Daily    predniSONE  60 mg Oral Daily    sulfamethoxazole-trimethoprim  0.5 tablet Oral Once per day on Monday Wednesday Friday    cefepime  1,000 mg IntraVENous Q24H    
Pt arrived for image guided right Thoracentesis. Dr. Bailey explained the procedure including the risk and benefits of the procedure. All questions answered. Pt verbalizes understanding of the procedure and states no more questions. Consent confirmed. Vital signs stable. Labs, allergies, medications, and code status reviewed. No contraindications noted. Time out completed prior to procedure.    Vital Signs  Vitals:    01/07/25 1452   BP: 134/89   Pulse: 73   Resp: 18   Temp: 97.2 °F (36.2 °C)   SpO2: 99%    (vital signs in table format)      Allergies  Percocet [oxycodone-acetaminophen] and Chocolate (allergies)    Labs  Lab Results   Component Value Date    INR 1.11 01/06/2025    PROTIME 14.5 01/06/2025     Lab Results   Component Value Date    CREATININE 3.2 (H) 01/07/2025    BUN 51 (H) 01/07/2025     (L) 01/07/2025    K 3.9 01/07/2025    CL 95 (L) 01/07/2025    CO2 31 01/07/2025     Lab Results   Component Value Date    WBC 3.1 (L) 01/07/2025    HGB 7.2 (L) 01/07/2025    HCT 21.2 (L) 01/07/2025    MCV 89.9 01/07/2025     (L) 01/07/2025      
Pt awake in bed. Dressing to Rt chest port changed used sterile technique. Pt refused morning Protonix due to patient needing meds crushed in pudding and Protonix cannot be crushed. Patient repositioned/ turned with pillow support. Pure-wick in place. Pt continues to refuse bilat air boot and SCDs. Feet/ heels elevated with pillow support. No other needs noted at this time. Bed alarm in place. Call light and bedside table within reach.   
Pt resting in bed with eyes closed. Pt resp even and unlabored. Pt shows no s/s of distress. Pt bed is in the lowest position, bed alarm is on, and call light is within reach.    
Pt resting in bed with eyes closed. Pt resp even and unlabored. Pt shows no s/s of distress. Pt bed is in the lowest position, bed alarm is on, and call light is within reach.    
Pulmonary Progress Note  CC: Hemoptysis    Subjective: No further reports of hemoptysis    EXAM: /62   Pulse 71   Temp 97.2 °F (36.2 °C) (Oral)   Resp 16   Ht 1.651 m (5' 5\")   Wt 73.5 kg (162 lb 0.6 oz)   SpO2 100%   BMI 26.96 kg/m²  on 4L  Constitutional:  No acute distress.   Eyes: PERRL. Conjunctivae anicteric.   ENT: Normal nose. Normal tongue.    Neck:  Trachea is midline.   Respiratory: No accessory muscle usage.   decreased breath sounds. No wheezes. No rales. No Rhonchi.  Cardiovascular: Normal S1S2. No digit clubbing. No digit cyanosis. No LE edema.   Psychiatric: No anxiety or Agitation. Alert and Oriented to person, place and time.    Scheduled Meds:   [START ON 1/10/2025] pantoprazole  40 mg Oral QAM AC    ceFAZolin  1,000 mg IntraVENous Q24H    polyethylene glycol  17 g Oral BID    epoetin vickey-epbx  10,000 Units IntraVENous Once per day on Monday Wednesday Friday    methylPREDNISolone  60 mg IntraVENous Q8H    sodium chloride flush  5-40 mL IntraVENous 2 times per day    folic acid  1 mg Oral Daily    sulfamethoxazole-trimethoprim  0.5 tablet Oral Once per day on Monday Wednesday Friday    HYDROcodone-acetaminophen  1 tablet Oral TID    traZODone  25 mg Oral Nightly    busPIRone  10 mg Oral TID    ipratropium 0.5 mg-albuterol 2.5 mg  1 Dose Inhalation BID RT     Continuous Infusions:   sodium chloride      sodium chloride       PRN Meds:  sodium chloride flush, sodium chloride, ondansetron **OR** ondansetron, albuterol sulfate HFA, benzonatate, HYDROcodone-acetaminophen, midodrine, sodium chloride    Labs:  CBC:   Recent Labs     01/07/25  0626 01/07/25  1854 01/08/25  0427 01/08/25  1307 01/08/25  2241 01/09/25  0527   WBC 3.1*  --  2.6*  --   --  3.3*   HGB 7.2*   < > 7.6* 7.9* 7.4* 7.8*   HCT 21.2*   < > 22.3* 23.1* 21.8* 22.9*   MCV 89.9  --  89.6  --   --  90.4   *  --  143  --   --  160    < > = values in this interval not displayed.     BMP:   Recent Labs     01/07/25  0626 
Pulmonary Progress Note  CC: Hemoptysis    Subjective: She reported a thoracentesis well yesterday.  The patient reports less hemoptysis today but cannot quantify.  There are no bloody tissues in her room.  The nurse has not observed any hemoptysis.  She is on dialysis now.  Both of her blood cultures yesterday came back with MSSA and she is now on Ancef for possible line infection    EXAM: /73   Pulse 78   Temp 97.4 °F (36.3 °C)   Resp 18   Ht 1.651 m (5' 5\")   Wt 72.2 kg (159 lb 1.6 oz)   SpO2 100%   BMI 26.48 kg/m²  on 4L  Constitutional:  No acute distress.   Eyes: PERRL. Conjunctivae anicteric.   ENT: Normal nose. Normal tongue.    Neck:  Trachea is midline.   Respiratory: No accessory muscle usage.   decreased breath sounds. No wheezes. No rales. No Rhonchi.  Cardiovascular: Normal S1S2. No digit clubbing. No digit cyanosis. No LE edema.   Psychiatric: No anxiety or Agitation. Alert and Oriented to person, place and time.    Scheduled Meds:   [START ON 1/9/2025] levoFLOXacin  500 mg Oral Every Other Day    [START ON 1/9/2025] ceFAZolin  1,000 mg IntraVENous Q24H    polyethylene glycol  17 g Oral BID    epoetin vickey-epbx  10,000 Units IntraVENous Once per day on Monday Wednesday Friday    methylPREDNISolone  60 mg IntraVENous Q8H    sodium chloride flush  5-40 mL IntraVENous 2 times per day    pantoprazole (PROTONIX) 40 mg in sodium chloride (PF) 0.9 % 10 mL injection  40 mg IntraVENous Q12H    folic acid  1 mg Oral Daily    sulfamethoxazole-trimethoprim  0.5 tablet Oral Once per day on Monday Wednesday Friday    HYDROcodone-acetaminophen  1 tablet Oral TID    traZODone  25 mg Oral Nightly    busPIRone  10 mg Oral TID    ipratropium 0.5 mg-albuterol 2.5 mg  1 Dose Inhalation BID RT     Continuous Infusions:   sodium chloride      sodium chloride       PRN Meds:  sodium chloride flush, sodium chloride, ondansetron **OR** ondansetron, albuterol sulfate HFA, benzonatate, HYDROcodone-acetaminophen, 
RT Inhaler-Nebulizer Bronchodilator Protocol Note    There is a bronchodilator order in the chart from a provider indicating to follow the RT Bronchodilator Protocol and there is an “Initiate RT Inhaler-Nebulizer Bronchodilator Protocol” order as well (see protocol at bottom of note).    CXR Findings:  No results found.    The findings from the last RT Protocol Assessment were as follows:   History Pulmonary Disease: (P) Chronic pulmonary disease  Respiratory Pattern: (P) Regular pattern and RR 12-20 bpm  Breath Sounds: (P) Slightly diminished and/or crackles  Cough: (P) Strong, spontaneous, non-productive  Indication for Bronchodilator Therapy: (P) Decreased or absent breath sounds  Bronchodilator Assessment Score: (P) 4    Aerosolized bronchodilator medication orders have been revised according to the RT Inhaler-Nebulizer Bronchodilator Protocol below.    Respiratory Therapist to perform RT Therapy Protocol Assessment initially then follow the protocol.  Repeat RT Therapy Protocol Assessment PRN for score 0-3 or on second treatment, BID, and PRN for scores above 3.    No Indications - adjust the frequency to every 6 hours PRN wheezing or bronchospasm, if no treatments needed after 48 hours then discontinue using Per Protocol order mode.     If indication present, adjust the RT bronchodilator orders based on the Bronchodilator Assessment Score as indicated below.  Use Inhaler orders unless patient has one or more of the following: on home nebulizer, not able to hold breath for 10 seconds, is not alert and oriented, cannot activate and use MDI correctly, or respiratory rate 25 breaths per minute or more, then use the equivalent nebulizer order(s) with same Frequency and PRN reasons based on the score.  If a patient is on this medication at home then do not decrease Frequency below that used at home.    0-3 - enter or revise RT bronchodilator order(s) to equivalent RT Bronchodilator order with Frequency of every 4 
RT Inhaler-Nebulizer Bronchodilator Protocol Note    There is a bronchodilator order in the chart from a provider indicating to follow the RT Bronchodilator Protocol and there is an “Initiate RT Inhaler-Nebulizer Bronchodilator Protocol” order as well (see protocol at bottom of note).    CXR Findings:  No results found.    The findings from the last RT Protocol Assessment were as follows:   History Pulmonary Disease: (P) Chronic pulmonary disease  Respiratory Pattern: (P) Regular pattern and RR 12-20 bpm  Breath Sounds: (P) Slightly diminished and/or crackles  Cough: (P) Strong, spontaneous, non-productive  Indication for Bronchodilator Therapy: (P) Decreased or absent breath sounds  Bronchodilator Assessment Score: (P) 4    Aerosolized bronchodilator medication orders have been revised according to the RT Inhaler-Nebulizer Bronchodilator Protocol below.    Respiratory Therapist to perform RT Therapy Protocol Assessment initially then follow the protocol.  Repeat RT Therapy Protocol Assessment PRN for score 0-3 or on second treatment, BID, and PRN for scores above 3.    No Indications - adjust the frequency to every 6 hours PRN wheezing or bronchospasm, if no treatments needed after 48 hours then discontinue using Per Protocol order mode.     If indication present, adjust the RT bronchodilator orders based on the Bronchodilator Assessment Score as indicated below.  Use Inhaler orders unless patient has one or more of the following: on home nebulizer, not able to hold breath for 10 seconds, is not alert and oriented, cannot activate and use MDI correctly, or respiratory rate 25 breaths per minute or more, then use the equivalent nebulizer order(s) with same Frequency and PRN reasons based on the score.  If a patient is on this medication at home then do not decrease Frequency below that used at home.    0-3 - enter or revise RT bronchodilator order(s) to equivalent RT Bronchodilator order with Frequency of every 4 
RT Inhaler-Nebulizer Bronchodilator Protocol Note    There is a bronchodilator order in the chart from a provider indicating to follow the RT Bronchodilator Protocol and there is an “Initiate RT Inhaler-Nebulizer Bronchodilator Protocol” order as well (see protocol at bottom of note).    CXR Findings:  No results found.    The findings from the last RT Protocol Assessment were as follows:   History Pulmonary Disease: (P) Chronic pulmonary disease  Respiratory Pattern: (P) Regular pattern and RR 12-20 bpm  Breath Sounds: (P) Slightly diminished and/or crackles  Cough: (P) Strong, spontaneous, non-productive  Indication for Bronchodilator Therapy: Decreased or absent breath sounds  Bronchodilator Assessment Score: (P) 4    Aerosolized bronchodilator medication orders have been revised according to the RT Inhaler-Nebulizer Bronchodilator Protocol below.    Respiratory Therapist to perform RT Therapy Protocol Assessment initially then follow the protocol.  Repeat RT Therapy Protocol Assessment PRN for score 0-3 or on second treatment, BID, and PRN for scores above 3.    No Indications - adjust the frequency to every 6 hours PRN wheezing or bronchospasm, if no treatments needed after 48 hours then discontinue using Per Protocol order mode.     If indication present, adjust the RT bronchodilator orders based on the Bronchodilator Assessment Score as indicated below.  Use Inhaler orders unless patient has one or more of the following: on home nebulizer, not able to hold breath for 10 seconds, is not alert and oriented, cannot activate and use MDI correctly, or respiratory rate 25 breaths per minute or more, then use the equivalent nebulizer order(s) with same Frequency and PRN reasons based on the score.  If a patient is on this medication at home then do not decrease Frequency below that used at home.    0-3 - enter or revise RT bronchodilator order(s) to equivalent RT Bronchodilator order with Frequency of every 4 hours 
RT Inhaler-Nebulizer Bronchodilator Protocol Note    There is a bronchodilator order in the chart from a provider indicating to follow the RT Bronchodilator Protocol and there is an “Initiate RT Inhaler-Nebulizer Bronchodilator Protocol” order as well (see protocol at bottom of note).    CXR Findings:  No results found.    The findings from the last RT Protocol Assessment were as follows:   History Pulmonary Disease: Chronic pulmonary disease  Respiratory Pattern: Regular pattern and RR 12-20 bpm  Breath Sounds: Slightly diminished and/or crackles  Cough: Strong, spontaneous, non-productive  Indication for Bronchodilator Therapy: Decreased or absent breath sounds  Bronchodilator Assessment Score: 4    Aerosolized bronchodilator medication orders have been revised according to the RT Inhaler-Nebulizer Bronchodilator Protocol below.    Respiratory Therapist to perform RT Therapy Protocol Assessment initially then follow the protocol.  Repeat RT Therapy Protocol Assessment PRN for score 0-3 or on second treatment, BID, and PRN for scores above 3.    No Indications - adjust the frequency to every 6 hours PRN wheezing or bronchospasm, if no treatments needed after 48 hours then discontinue using Per Protocol order mode.     If indication present, adjust the RT bronchodilator orders based on the Bronchodilator Assessment Score as indicated below.  Use Inhaler orders unless patient has one or more of the following: on home nebulizer, not able to hold breath for 10 seconds, is not alert and oriented, cannot activate and use MDI correctly, or respiratory rate 25 breaths per minute or more, then use the equivalent nebulizer order(s) with same Frequency and PRN reasons based on the score.  If a patient is on this medication at home then do not decrease Frequency below that used at home.    0-3 - enter or revise RT bronchodilator order(s) to equivalent RT Bronchodilator order with Frequency of every 4 hours PRN for wheezing or 
RT Inhaler-Nebulizer Bronchodilator Protocol Note    There is a bronchodilator order in the chart from a provider indicating to follow the RT Bronchodilator Protocol and there is an “Initiate RT Inhaler-Nebulizer Bronchodilator Protocol” order as well (see protocol at bottom of note).    CXR Findings:  XR CHEST PORTABLE    Result Date: 1/7/2025  No evidence of pneumothorax following right thoracentesis.       The findings from the last RT Protocol Assessment were as follows:   History Pulmonary Disease: (P) Chronic pulmonary disease  Respiratory Pattern: (P) Regular pattern and RR 12-20 bpm  Breath Sounds: (P) Slightly diminished and/or crackles  Cough: (P) Strong, spontaneous, non-productive  Indication for Bronchodilator Therapy: (P) Decreased or absent breath sounds  Bronchodilator Assessment Score: (P) 4    Aerosolized bronchodilator medication orders have been revised according to the RT Inhaler-Nebulizer Bronchodilator Protocol below.    Respiratory Therapist to perform RT Therapy Protocol Assessment initially then follow the protocol.  Repeat RT Therapy Protocol Assessment PRN for score 0-3 or on second treatment, BID, and PRN for scores above 3.    No Indications - adjust the frequency to every 6 hours PRN wheezing or bronchospasm, if no treatments needed after 48 hours then discontinue using Per Protocol order mode.     If indication present, adjust the RT bronchodilator orders based on the Bronchodilator Assessment Score as indicated below.  Use Inhaler orders unless patient has one or more of the following: on home nebulizer, not able to hold breath for 10 seconds, is not alert and oriented, cannot activate and use MDI correctly, or respiratory rate 25 breaths per minute or more, then use the equivalent nebulizer order(s) with same Frequency and PRN reasons based on the score.  If a patient is on this medication at home then do not decrease Frequency below that used at home.    0-3 - enter or revise RT 
RT Inhaler-Nebulizer Bronchodilator Protocol Note    There is a bronchodilator order in the chart from a provider indicating to follow the RT Bronchodilator Protocol and there is an “Initiate RT Inhaler-Nebulizer Bronchodilator Protocol” order as well (see protocol at bottom of note).    CXR Findings:  XR CHEST PORTABLE    Result Date: 1/7/2025  No evidence of pneumothorax following right thoracentesis.       The findings from the last RT Protocol Assessment were as follows:   History Pulmonary Disease: Chronic pulmonary disease  Respiratory Pattern: Regular pattern and RR 12-20 bpm  Breath Sounds: Slightly diminished and/or crackles  Cough: Strong, spontaneous, non-productive  Indication for Bronchodilator Therapy: Decreased or absent breath sounds  Bronchodilator Assessment Score: 4    Aerosolized bronchodilator medication orders have been revised according to the RT Inhaler-Nebulizer Bronchodilator Protocol below.    Respiratory Therapist to perform RT Therapy Protocol Assessment initially then follow the protocol.  Repeat RT Therapy Protocol Assessment PRN for score 0-3 or on second treatment, BID, and PRN for scores above 3.    No Indications - adjust the frequency to every 6 hours PRN wheezing or bronchospasm, if no treatments needed after 48 hours then discontinue using Per Protocol order mode.     If indication present, adjust the RT bronchodilator orders based on the Bronchodilator Assessment Score as indicated below.  Use Inhaler orders unless patient has one or more of the following: on home nebulizer, not able to hold breath for 10 seconds, is not alert and oriented, cannot activate and use MDI correctly, or respiratory rate 25 breaths per minute or more, then use the equivalent nebulizer order(s) with same Frequency and PRN reasons based on the score.  If a patient is on this medication at home then do not decrease Frequency below that used at home.    0-3 - enter or revise RT bronchodilator order(s) to 
Report called to [facility], no answer.  
Shift assessment complete. VSS. Patient A/OX3, seems forgetful at times, asking where we are and then responding \"Oh okay, that's what I thought\". Pt c/o generalized pain. Scheduled meds given. See MAR. Pt turned and repositioned. BLE elevated w/ pillow support. Pt denies further needs. Call light within reach. Bed alarm on.   
Shift assessment complete. VSS. Patient A/OX4. C/o generalized pain. Scheduled meds given. See MAR. Pt turned and repositioned. Pt denies further needs at this time. Call light within reach. Bed alarm on.   
Shift assessment complete. VSS. Patient A/OX4. Denies pain. C/o headache, requesting Tylenol. Scheduled meds given. See MAR. Pt incont. Of urine. Jolene care provided. Pt turned and repositioned. Pt denies further needs. Call light within reach. Bed alarm on.   
Shift assessment complete. VSS. Patient A/OX4. Pt c/o pain 7/10 near vas cath site. PRN Norco given. Scheduled meds given. See MAR. Pt turned and repositioned. Pt denies further needs at this time. Call light within reach. Bed alarm on.   
Shift assessment complete. VSS. Patient A/OX4. Pt denies pain. Pt refusing Trazodone, Norco and Miralax. Other scheduled meds given. See MAR. Pt not wanting to be woken up again. Informed patient of night shift RN coming on shift. Pt denies further needs at this time. Call light within reach. Bed alarm on.   
Shift assessment complete. VSS. Patient A/Ox4. C/o generalized pain 7/10. Scheduled meds given. See MAR. Pt turned and repositioned. Pt hasn't voided since admission. Pure wick in place. Bladder scan showed 517ml. Message sent to Dr Gould. Pt proceeded to void 150ml in pure wick.  notified. Pt remains NPO for possible procedure. Pt denies further needs. Call light within reach. Bed alarm on.   
To Dialysis from IR via transport.   
Treatment time: 3 hours     Net UF: 2 L     Pre weight: 73.5 kg   Post weight: 71.5      Access used: R chest TDC   Access function: tolerated 350 BFR     Medications or blood products given: Heparin dwells, retacrit 10,000u, Zofran 4mg IV     Regular outpatient schedule: MWF     Summary of response to treatment: tolerated well, no issues with UF     Copy of dialysis treatment record placed in chart, to be scanned into EMR.  
Treatment time: 3 hrs    Net UF: 1500 ml    Pre weight: 75 kg  Post weight: 73.5 kg    Access used: Rt CW TDC  Access function: Well tolerated, 350 BFR    Medications or blood products given: Retacrit 10,000 units, Heparin dwells    Regular outpatient schedule: MWF    Summary of response to treatment: Pt tolerated well. Pt remained stable throughout entire treatment and upon exiting the hemodialysis suite.     
[facility]   Pharmacy Pharmacokinetic Monitoring Service - Vancomycin     Emily Alegre is a 69 y.o. female starting on vancomycin therapy for PNA. Pharmacy consulted by ISABEL Gong for monitoring and adjustment.    Target Concentration: Dosing based on anticipated concentration <15 mg/L due to renal impairment/insufficiency    Additional Antimicrobials: cefepime    Pertinent Laboratory Values:   Wt Readings from Last 1 Encounters:   01/06/25 68.9 kg (152 lb)     Temp Readings from Last 1 Encounters:   01/06/25 98.3 °F (36.8 °C) (Oral)     Estimated Creatinine Clearance: 19 mL/min (A) (based on SCr of 2.8 mg/dL (H)).  Recent Labs     01/06/25  0912 01/06/25  0918   CREATININE 2.8*  --    BUN 44*  --    WBC  --  3.9*     Procalcitonin:     Pertinent Cultures:  Culture Date Source Results   1/6 blood NGTD   MRSA Nasal Swab: not ordered. Order placed by pharmacy.    Plan:  Concentration-guided dosing due to renal impairment/insufficiency  Start vancomycin 1500mg times one , will pulse dose for now  Renal labs as indicated   Vancomycin concentration ordered for 1/7 @ 0600   Pharmacy will continue to monitor patient and adjust therapy as indicated    Thank you for the consult,  Marybel Pfeiffer RPH  1/6/2025 1:56 PM   
Vancomycin Day: 2  Current Regimen:Pulse dose with HD (MWF)    Patient's labs, cultures, vitals, and vancomycin regimen reviewed.   SCr HD  U/O  HD      Plan:   Vanc level this am 18.5, HD on MWF schedule  Will hold today. Levels ordered on MWF dose with HD  Marybel Pfeiffer Pharm D 1/7/202511:30 AM  .        
Vero Mujica MD Notified of pt having a critical hematocrit of 20.9%. No new orders at this time.  
We were asked to remove port for bacteremia.    Patient was scheduled for port removal.  IR team contacted me and stated they could remove port at same time they exchange her tunneled HDU catheter on the same side.      This will allow the patient to have one procedure session , so IR will plan to do both procedures.    
Witnessed patient cough up a large blood clot. TERI Reynoso made aware via perfect serve.   
N/A  Laundry:                                              1st floor  Bathroom:                                           tub/shower unit and standard height toilet  Pt sleeps in a:                                     Recliner, Couch  Equipment owned:                              RW and home O2 (2L) continous     Preadmission Status:  Pt able to drive: No  Pt fully independent with ADLs: Yes  Pt receives assistance from family for: Bathing, Dressing, Cleaning, Cooking, Laundry , grocery shopping, and IADLS (med management, paying bills, etc)  Pt required physical assistance for functional transfers and utilized RW for mobility in home and RW out in community  History of falls:            No  Home Health Services: None currently; had been trying to set up home health.    AM-PAC Score: AM-PAC Inpatient Daily Activity Raw Score: 13     Subjective:  Patient lying reclined in bed with no family present.   Pt agreeable to this OT session.     Cognition:    A&O orientation not directly assessed.    Able to follow 1 step commands    Pain:   Yes  Location: abdomen  Rating: moderate /10  Pain Medicine Status: No request made    Activity Tolerance:   Pt completed therapy session with fatigue, pain    Bed Mobility:   Rolling:   Mod A   Scooting in sitting: Not Tested  Scooting in supine:  Not Tested    Transfers:    Bed to chair:     Total A and with MAXI MOVE to chair    ADLs:  Dressing:      UE:   Mod A change gown  LE:    Not Tested    Eating:   SBA    Toileting:  Max A     Ther Ex / Activities Initiated:   Exercises complete bilaterally with AROM assistance.  Finger flex/ext x10  Cervical stretches  Pink resistive sponge issued and written HEP reviewed/completed with BUEs  Lotion massage to B hands to facilitate AAROM, patient then also completed self massage with lotion for AROM to B hands    Positioning Needs:   Pt up in chair and alarm set  Call light provided and all needs within reach    Patient/Family Education:   Pt 
and Phase II process.  23.  Patient pain level is established preoperatively using age appropriate pain scale.  24.  The patient will move to fall risk upon sedation- during and through the recovery phase.  Interventions- orient the patient to the environment, especially the location of the bathroom; provide treaded socks/non-skid footwear; demonstrate and teach back use of the nurse's call system; instruct the patient to call for help before getting out of bed; lock all movable equipment before transferring patient; keep bed in lowest position possible. 25.  Other:  
first dose of Rituximab on 12/19; we will continue hemodialysis per MWF schedule, and will hold second dose of Rituximab due to bacteremia. Changed prednisone to 50 mg daily from 1/13/25     2- No significant electrolytes disorders noted.     3- HTN: Blood pressure within acceptable range.     4- Anemia: Stable hemoglobin, resumed MISA during dialysis.     5- MSSA bacteremia: s/p tunneled dialysis catheter exchange 1/13 since repeat blood cultures are negative.    
floor  Steps to enter first floor:                     2 steps to enter; no handrails  Steps to second floor/basement:        N/A  Laundry:                                              1st floor  Bathroom:                                           tub/shower unit and standard height toilet  Pt sleeps in a:                                     Recliner, Couch  Equipment owned:                              RW and home O2 (2L) continous     Preadmission Status:  Pt able to drive: No  Pt fully independent with ADLs: Yes  Pt receives assistance from family for: Bathing, Dressing, Cleaning, Cooking, Laundry , grocery shopping, and IADLS (med management, paying bills, etc)  Pt required physical assistance for functional transfers and utilized RW for mobility in home and RW out in community  History of falls:            No  Home Health Services: None currently; had been trying to set up home health.    AM-PAC Score: AM-PAC Inpatient Daily Activity Raw Score: 12     Subjective:  Patient lying reclined in bed with family present (son).   Pt agreeable to this OT session.     Cognition:    A&O orientation not directly assessed.    Able to follow 1 step commands    Pain:   Yes  Location: abdomen  Rating: moderate /10  Pain Medicine Status: No request made    Activity Tolerance:   Pt completed therapy session with fatigue     BP (mmHg) HR (bpm) SpO2 (%) on 4L Comments   Supine at rest  85 97    Seated at EOB  86 94    Standing       End of session         Objective:  Does this pt have an acute or acute on chronic diagnosis of CHF? No    Balance:  Sitting:    Min A at EOB  Standing:    Max A  and 2 person brief partial stand (about 75% of full stand, holding onto PT's elbows    Bed Mobility:   Supine to Sit:    Max A , 2 person, HOB elevated , With increased time, and With cues for hand placement  Sit to Supine:   Max A , 2 person, and HOB flat  Rolling:   Max A   Scooting in sitting: Max A , 2 person, and With increased time and 
is on this medication at home then do not decrease Frequency below that used at home.    0-3 - enter or revise RT bronchodilator order(s) to equivalent RT Bronchodilator order with Frequency of every 4 hours PRN for wheezing or increased work of breathing using Per Protocol order mode.        4-6 - enter or revise RT Bronchodilator order(s) to two equivalent RT bronchodilator orders with one order with BID Frequency and one order with Frequency of every 4 hours PRN wheezing or increased work of breathing using Per Protocol order mode.        7-10 - enter or revise RT Bronchodilator order(s) to two equivalent RT bronchodilator orders with one order with TID Frequency and one order with Frequency of every 4 hours PRN wheezing or increased work of breathing using Per Protocol order mode.       11-13 - enter or revise RT Bronchodilator order(s) to one equivalent RT bronchodilator order with QID Frequency and an Albuterol order with Frequency of every 4 hours PRN wheezing or increased work of breathing using Per Protocol order mode.      Greater than 13 - enter or revise RT Bronchodilator order(s) to one equivalent RT bronchodilator order with every 4 hours Frequency and an Albuterol order with Frequency of every 2 hours PRN wheezing or increased work of breathing using Per Protocol order mode.       Electronically signed by Keith Urias RCP on 1/11/2025 at 8:04 PM  
      Cultures:   Results       Procedure Component Value Units Date/Time    Culture, Blood 1 [[id number]] Collected: 01/09/25 1028    Order Status: Completed Specimen: Blood Updated: 01/13/25 1115     Blood Culture, Routine No Growth after 4 days of incubation.    Narrative:      ORDER#: [id number]                          ORDERED BY: KIMBERLY SIMONS  SOURCE: Blood Hand, Right                  COLLECTED:  01/09/25 10:28  ANTIBIOTICS AT HOLDEN.:                      RECEIVED :  01/09/25 10:28  If child <=2 yrs old please draw pediatric bottle.~Blood Culture 1    Culture with Smear, Acid Fast Bacillius [[id number]] Collected: 01/07/25 1523    Order Status: Sent Specimen: Thoracentesis Updated: 01/08/25 1518     AFB Smear No AFB observed by Fluorescent stain    Narrative:      ORDER#: [id number]                          ORDERED BY: KORIN FRANCO  SOURCE: Thoracentesis                      COLLECTED:  01/07/25 15:23  ANTIBIOTICS AT HOLDEN.:                      RECEIVED :  01/08/25 03:03    Culture, Body Fluid (with Gram Stain) [[id number]] Collected: 01/07/25 1523    Order Status: Completed Specimen: Thoracentesis Updated: 01/10/25 0823     Body Fluid Culture, Sterile No growth 60 to 72 hours     Gram Stain Result Cytospin performed,no quantitation  WBC's present  No Epithelial Cells seen  No organisms seen      Narrative:      ORDER#: [id number]                          ORDERED BY: KORIN FRANCO  SOURCE: Thoracentesis                      COLLECTED:  01/07/25 15:23  ANTIBIOTICS AT HOLDEN.:                      RECEIVED :  01/07/25 23:01    Culture, Respiratory [[id number]] Collected: 01/07/25 1204    Order Status: Completed Specimen: Sputum Expectorated Updated: 01/09/25 1113     CULTURE, RESPIRATORY Normal respiratory carina     Gram Stain Result 2+ Epithelial Cells  2+ Yeast  4+ Gram positive rods  3+ WBC's  3+ Gram positive cocci      Narrative:      ORDER#: [id number]                          ORDERED BY: ROB 
gait belt + bed sheet under pelvis to assist with upward list, x3 trials from EOB + max verbal and tactile cues for safe hand/trunk placement   Stand to sit:    Max A , 2 person, and With JOVANI LICO and gait belt  Bed to chair:     Not Tested  Bed/ chair to standard toilet:  Not Tested  Bed/chair to BSC:   Not Tested  Functional Mobility:   Not Tested    Deferred transfer to chair with LICO due to extremely heavy assist x 2 required for transfer  ADLs:  Dressing:      UE:   Not Tested  LE:    Max A  to don socks    Bathing:    UE:  Not Tested  LE:  Not Tested    Eating:   Mod A  to bring cup to mouth    Toileting:  Not Tested    Grooming/hygiene: Not Tested    Ther Ex / Activities Initiated:   N/A    Positioning Needs:   Pt in bed and alarm set  Call light provided and all needs within reach  RN aware of pt position/status    Patient/Family Education:   Pt educated on role of inpatient OT, plan of care, importance of continued activity, DC recommendations, functional transfer/mobility safety, transfer techniques, energy conservation, pacing activity, and calling for assist with mobility    CHF Education  N/A    Assessment:  Pt seen for occupational therapy followup session in the acute care setting.  Pt limited by decreased activity tolerance, ADLs, IADLs, balance, dressing, and bed mobility, as well as safety awareness this date. Making fair progress toward goals. Pt functioning below baseline and will likely benefit from continued skilled occupational therapy services to maximize safety and independence.     Recommending SNF upon discharge as patient functioning below baseline, demonstrates good rehab potential and unable to return home due to inability to negotiate stairs to enter home/bedroom/bathroom, burden of care beyond caregiver ability, home environment not conducive to patient recovery, and limited safety awareness.    Goal(s) all goals ongoing 1/12/25:   To be met in 3 Visits:  Bed to toilet/BSC:        
right perinephric hematoma.             Endoscopy  History of perforated diverticulitis with ostomy and subsequent takedown 2023 with Dr. Jerome, had colonoscopy prior to reversal without lesions.     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 She also noted to have a history of stage IV laryngeal cancer prior trach that is subsequently removed. She was admitted with acute blood loss anemia. Initially there was concern for hematemesis however the patient reports hemoptysis which has been witnessed by nursing staff overnight.  She has known ANCA associated vasculitis and prior laryngeal cancer stage IV.  Prior PEG tube with removal in November.  She denies abdominal pain.  She has not had a bowel movement in multiple days.  She is on IV PPI.  Pulmonology has been consulted.     RECOMMENDATIONS:  Will hold off upper endoscopy due to reported hemoptysis as well as 4 L nasal cannula does not appear to be significant GI bleeding going on.   Pulmonology following  She is high risk for peptic ulcer disease but there is been no overt GI bleeding at this time.  Continue IV PPI twice daily  Will start Miralax BID for constipation.  Avoid anticoagulation  It does not appear that the patient is having any overt GI bleeding at this time so GI will sign off. Please do not hesitate to call us with any new concerns as they arise.          If you have any questions or need any further information, please feel free to contact us [phone] or reach out via Mevvy.  Thank you for allowing us to participate in the care of Emily Alegre.    The note was completed using Dragon voice recognition transcription. Every effort was made to ensure accuracy; however, inadvertent transcription errors may be present despite my best efforts to edit errors.    Amanda Cortes PA-C     
01/07/25 23:01    Culture, Respiratory [[id number]] Collected: 01/07/25 1204    Order Status: Completed Specimen: Sputum Expectorated Updated: 01/09/25 1113     CULTURE, RESPIRATORY Normal respiratory carina     Gram Stain Result 2+ Epithelial Cells  2+ Yeast  4+ Gram positive rods  3+ WBC's  3+ Gram positive cocci      Narrative:      ORDER#: [id number]                          ORDERED BY: LIZETTE RIVAS  SOURCE: Sputum Expectorated                COLLECTED:  01/07/25 12:04  ANTIBIOTICS AT HOLDEN.:                      RECEIVED :  01/07/25 17:57    MRSA DNA Probe, Nasal [[id number]] Collected: 01/06/25 1728    Order Status: Completed Specimen: Nares Updated: 01/07/25 1227     MRSA SCREEN RT-PCR --     Negative  MRSA DNA not detected.  Normal Range: Not detected      Narrative:      ORDER#: [id number]                          ORDERED BY: FANI GARCIA  SOURCE: Nares                              COLLECTED:  01/06/25 17:28  ANTIBIOTICS AT HOLDEN.:                      RECEIVED :  01/06/25 17:34    RSV Detection [[id number]] Collected: 01/06/25 1245    Order Status: Completed Specimen: Nasopharyngeal Swab Updated: 01/06/25 1302     RSV Rapid Ag Negative    COVID-19, Rapid [[id number]] Collected: 01/06/25 1245    Order Status: Completed Specimen: Nasopharyngeal Swab Updated: 01/06/25 1304     SARS-CoV-2, NAAT Not Detected    Rapid influenza A/B antigens [[id number]] Collected: 01/06/25 1245    Order Status: Completed Specimen: Nasopharyngeal Updated: 01/06/25 1343     Rapid Influenza A Ag Negative     Rapid Influenza B Ag Negative    COVID-19 & Influenza Combo [[id number]]     Order Status: Canceled Specimen: Nasopharyngeal Swab     Blood Culture 2 [[id number]]  (Abnormal) Collected: 01/06/25 1223    Order Status: Completed Specimen: Blood Updated: 01/09/25 0815     Organism Staphylococcus aureus     Culture, Blood 2 --     POSITIVE for  No further workup  Isolated two of two sets  Refer to culture [id number] for sensitivity 
call not needed per SOP, 01/08/2025 09:06,  by PANKAJ  Previous panic on this admission - call not needed per SOP, 01/07/2025 14:29,  by [facility]  Microbiology results called to and read back by lili TOLENTINO, 01/07/2025 14:24, by  DAR  Microbiology results called to and read back by RN , 01/07/2025 14:23, by  DAR  If child <=2 yrs old please draw pediatric bottle.~Blood Culture 1    Culture, Blood, PCR ID Panel [[id number]] Collected: 01/06/25 1221    Order Status: Completed Updated: 01/07/25 1428     Report SEE IMAGE    Narrative:      CALL  [facility]  [facility] tel. [phone],  Microbiology results called to and read back by lili TOLENTINO, 01/07/2025 14:24, by  DAR  Microbiology results called to and read back by CHUCK GUILLEN, 01/07/2025 14:23, by  DAR             US THORACENTESIS Which side should the procedure be performed? Right   Final Result   Successful ultrasound guided right thoracentesis.         XR CHEST PORTABLE   Final Result   No evidence of pneumothorax following right thoracentesis.         CTA ABDOMEN PELVIS W WO CONTRAST   Final Result   1. No definite evidence of active bleed.   2. Right subcapsular hematoma involving the right kidney as well as adjacent   suprarenal hematoma are grossly stable and consistent evolution of blood   products from prior med exam.   3. Small bilateral pleural effusions with atelectasis.         CT CHEST WO CONTRAST   Final Result   1. Multifocal bilateral pneumonia with small parapneumonic effusions, right   greater than left.   2. Partially imaged enlarging right perinephric hematoma.             Assessment:  Principal Problem:    Hematemesis  Active Problems:    H/O laryngeal cancer    ANCA-associated vasculitis (HCC)    Acute blood loss anemia    HCAP (healthcare-associated pneumonia)    Chronic hypoxic respiratory failure    Hemoptysis    ESRD on dialysis ([facility])  Resolved Problems:    * No resolved hospital problems. *      Plan:      S aureus bacteremia- it is MSSA per BCID panel. 
Collected: 01/06/25 1221    Order Status: Completed Updated: 01/07/25 1428     Report SEE IMAGE    Narrative:      CALL  [facility]  [facility] tel. [phone],  Microbiology results called to and read back by rx RAJAN, 01/07/2025 14:24, by  DAR  Microbiology results called to and read back by RN MRS, 01/07/2025 14:23, by  DAR             US THORACENTESIS Which side should the procedure be performed? Right   Final Result   Successful ultrasound guided right thoracentesis.         XR CHEST PORTABLE   Final Result   No evidence of pneumothorax following right thoracentesis.         CTA ABDOMEN PELVIS W WO CONTRAST   Final Result   1. No definite evidence of active bleed.   2. Right subcapsular hematoma involving the right kidney as well as adjacent   suprarenal hematoma are grossly stable and consistent evolution of blood   products from prior med exam.   3. Small bilateral pleural effusions with atelectasis.         CT CHEST WO CONTRAST   Final Result   1. Multifocal bilateral pneumonia with small parapneumonic effusions, right   greater than left.   2. Partially imaged enlarging right perinephric hematoma.             Assessment:  Principal Problem:    Hematemesis  Active Problems:    H/O laryngeal cancer    ANCA-associated vasculitis (HCC)    Acute blood loss anemia    HCAP (healthcare-associated pneumonia)    Chronic hypoxic respiratory failure    Hemoptysis    ESRD on dialysis ([facility])  Resolved Problems:    * No resolved hospital problems. *      Plan:      S aureus bacteremia- it is MSSA per BCID panel. Source possibly HD catheter. Discussed with nephrology. Treat with IV Ancef and do catheter exchange in a few days. Port a cath is another possible source.  I have ordered a repeat blood culture.  This is NGTD. Consider line exchange of HD on Monday.  May need port a cath removed. Consult surgery.  Hemoptysis. She says it is improved. Dose of steroids.   Acute on chronic anemia with known renal hematoma.  -reported in ED 
of hematemesis this morning, denies this to me, reports hemoptysis which has been ongoing for months.   -CTA AP showing right subscapular hematoma involving right kidney as well as adjacent suprarenal hematoma - grossly stable and consistent evolution of blood products from prior exam.  -s/p multiple units PRBC during previous hospital stay due to low hemoglobin following kidney bx.  -hemoccult negative.  -hemoglobin 7.4, stable from baseline 7.0-7.8.  -change to PO Protonix  Renal diet.  -GI consulted.  No plans for endoscopy.  -pulm consulted for hemoptysis.  No plans for bronchoscopy     Possible pneumonia vs vasculitis  Chronic hypoxic respiratory failure.  Pleural effusion- thoracentesis done and 500 cc of fluid removed.  -wears 4L O2 at baseline, currently on baseline O2.  -CT chest showing multifocal bilateral pneumonia with small parapneumonic effusions.  -s/p bronchoscopy 12/17/2024.  - IV ancef       ESRD.  ANCA vasculitis.  Petechial rash.  -on dialysis MWF as of 12/16, receives 1000U Retacrit with each dialysis.  -s/p renal bx 12/20, was given Rituxin.  -continue daily Prednisone.   -bactrim MWF for PCP prophylaxis.  -nephrology consulted.      Hx stage 4 laryngeal cancer.  -s/p chemo/radiation, completed 8/2024, had trach de-cannulated and PEG removed 11/2024.  -follows with [facility].          Note above makes patient higher risk for morbidity and mortality requiring testing and treatment.   DVT Prophylaxis: SCDs  Diet:renal  Code Status: Prior      Limited prognosis.     KIMBERLY SIMONS MD 1/10/2025 2:16 PM                
cancer.  -s/p chemo/radiation, completed 8/2024, had trach de-cannulated and PEG removed 11/2024.  -follows with [facility].       Dysphagia - ongoing SLP eval    Physical deconditioning - start PT    Constipation - add miralax , colace, suppository today        Note above makes patient higher risk for morbidity and mortality requiring testing and treatment.       DVT Prophylaxis: SCDs  Diet:renal  Code Status: full code        Limited overall prognosis.     Juan Kelsey MD 1/14/2025 9:58 AM                
frail  compromised  immune function  hx GERD / GI disease affecting oropharyngeal swallowing  Current smoker / chronic smoking hx    Clinical signs of oropharyngeal dysphagia likely chronic related to hx of dysphagia, H/N Ca, and XRT. Swallow prognosis is fair. Instrumental swallow study could be considered (recent MBS 11/2024)but  is not indicated as pt states information gleaned from assessment will not change her goals of care and desire to continue to eat preferred consistencies despite risk of choking and aspiration. Diet modification is warranted, however this is not without risk as pt may be less inclined to achieve adequate po intake with non-preferred consistencies. Pt is in agreement with meeting with RD to discuss preferences for possible increased variety of menu. SLP also agreeable to complete therapeutic trials of transitional foods during treatment sessions to assess for potential options for increased QOL. Given willingness to participate in therapy, and lack of aspiration noted on recent instrumental assessment, pt is safe for oral diet at this time from SLP standpoint. Correlate with MD.     Instrumentation: Not clinically indicated at this time  Diet recommendation: IDDSI 5 Minced and moist Solids; IDDSI 0 Thin Liquids; Meds crushed in puree as able  Risk management: small bites/sips, oral care q4 hrs to reduce adverse affects in the event of aspiration, increase physical mobility as able, alternate bites/sips, slow rate of intake, general GERD precautions, and hold PO and contact SLP if s/s of aspiration or worsening respiratory status develop.Three second bolus hold may decrease risk of bolus misdirection per MBS.     Prognosis: Fair    Recommended Intervention:   Dysphagia treatment  Therapeutic PO Trials                 Dysphagia Therapeutic Intervention:   Oral care  Diet tolerance monitoring  Patient/family education  Therapeutic PO trials    Referrals:   RD- for pt preferences; does not

## 2025-01-16 NOTE — PLAN OF CARE
Problem: Skin/Tissue Integrity  Goal: Absence of new skin breakdown  Description: 1.  Monitor for areas of redness and/or skin breakdown  2.  Assess vascular access sites hourly  3.  Every 4-6 hours minimum:  Change oxygen saturation probe site  4.  Every 4-6 hours:  If on nasal continuous positive airway pressure, respiratory therapy assess nares and determine need for appliance change or resting period.  1/16/2025 0847 by Jl Herron, RN  Outcome: Progressing  1/15/2025 2342 by Awa Hoffman, RN  Outcome: Progressing

## 2025-01-20 LAB
FUNGUS SPEC CULT: ABNORMAL
FUNGUS SPEC CULT: ABNORMAL
LOEFFLER MB STN SPEC: ABNORMAL
LOEFFLER MB STN SPEC: ABNORMAL
ORGANISM: ABNORMAL
ORGANISM: ABNORMAL

## 2025-01-21 LAB
ACID FAST STN SPEC QL: NORMAL
MYCOBACTERIUM SPEC CULT: NORMAL

## 2025-01-28 LAB
ACID FAST STN SPEC QL: NORMAL
MYCOBACTERIUM SPEC CULT: NORMAL

## 2025-02-04 LAB
ACID FAST STN SPEC QL: NORMAL
MYCOBACTERIUM SPEC CULT: NORMAL

## 2025-02-11 LAB
ACID FAST STN SPEC QL: NORMAL
MYCOBACTERIUM SPEC CULT: NORMAL

## 2025-02-18 LAB
ACID FAST STN SPEC QL: NORMAL
MYCOBACTERIUM SPEC CULT: NORMAL

## 2025-02-25 LAB
ACID FAST STN SPEC QL: NORMAL
MYCOBACTERIUM SPEC CULT: NORMAL

## (undated) DEVICE — ELECTRODE ECG MONITR FOAM TEAR DROP ADLT RED

## (undated) DEVICE — AIRWAY RETRIEVAL BASKET: Brand: ZERO TIP

## (undated) DEVICE — SINGLE USE SUCTION VALVE MAJ-209: Brand: SINGLE USE SUCTION VALVE (STERILE)

## (undated) DEVICE — SYRINGE MED 10ML SLIP TIP BLNT FILL AND LUERLOCK DISP

## (undated) DEVICE — SHEET,DRAPE,40X58,STERILE: Brand: MEDLINE

## (undated) DEVICE — SYRINGE MED 50ML LUERSLIP TIP

## (undated) DEVICE — CONMED SCOPE SAVER BITE BLOCK, 20X27 MM: Brand: SCOPE SAVER

## (undated) DEVICE — SINGLE USE BIOPSY VALVE MAJ-210: Brand: SINGLE USE BIOPSY VALVE (STERILE)

## (undated) DEVICE — DISPOSABLE BIOPSY FORCEPS: Brand: DISPOSABLE BIOPSY FORCEPS